# Patient Record
Sex: FEMALE | Race: WHITE | NOT HISPANIC OR LATINO | Employment: FULL TIME | URBAN - METROPOLITAN AREA
[De-identification: names, ages, dates, MRNs, and addresses within clinical notes are randomized per-mention and may not be internally consistent; named-entity substitution may affect disease eponyms.]

---

## 2023-09-06 LAB
ABO GROUP (TYPE) IN BLOOD: NORMAL
ANTIBODY SCREEN: NORMAL
HEPATITIS B VIRUS SURFACE AG PRESENCE IN SERUM: NONREACTIVE
HEPATITIS C VIRUS AB PRESENCE IN SERUM: NONREACTIVE
HIV 1/ 2 AG/AB SCREEN: NONREACTIVE
RH FACTOR: NORMAL
SYPHILIS TOTAL AB: NONREACTIVE

## 2023-09-07 LAB
CHLAMYDIA TRACH., AMPLIFIED: NEGATIVE
N. GONORRHEA, AMPLIFIED: NEGATIVE

## 2023-10-13 ENCOUNTER — TELEPHONE (OUTPATIENT)
Dept: ENDOCRINOLOGY | Facility: CLINIC | Age: 33
End: 2023-10-13
Payer: COMMERCIAL

## 2023-10-13 DIAGNOSIS — Z31.41 FERTILITY TESTING: ICD-10-CM

## 2023-10-13 DIAGNOSIS — Z01.812 ENCOUNTER FOR PREPROCEDURAL LABORATORY EXAMINATION: ICD-10-CM

## 2023-10-13 NOTE — TELEPHONE ENCOUNTER
Patient says she will start her cycle next week and needs a Hysterscopy order placed for when she starts     Order pended to Delmy per her note.  Myra Prieto RN 10/13/23 2:02 PM

## 2023-10-16 ASSESSMENT — LIFESTYLE VARIABLES
HISTORY_ALCOHOL_USE: NO
TOBACCO_USE: YES

## 2023-10-17 PROBLEM — M17.9 OSTEOARTHRITIS OF KNEE: Status: ACTIVE | Noted: 2018-04-19

## 2023-10-17 PROBLEM — L68.0 FEMALE HIRSUTISM: Status: ACTIVE | Noted: 2023-10-17

## 2023-10-17 PROBLEM — E03.9 HYPOTHYROID: Status: ACTIVE | Noted: 2023-10-17

## 2023-10-17 PROBLEM — Z98.84 S/P LAPAROSCOPIC SLEEVE GASTRECTOMY: Status: ACTIVE | Noted: 2020-07-16

## 2023-10-17 PROBLEM — Z86.010 HISTORY OF COLONIC POLYPS: Status: ACTIVE | Noted: 2019-10-14

## 2023-10-17 PROBLEM — G47.33 OSA (OBSTRUCTIVE SLEEP APNEA): Status: ACTIVE | Noted: 2020-03-19

## 2023-10-17 PROBLEM — K44.9 HIATAL HERNIA: Status: ACTIVE | Noted: 2020-03-10

## 2023-10-17 PROBLEM — N97.9 SECONDARY FEMALE INFERTILITY: Status: ACTIVE | Noted: 2023-10-17

## 2023-10-17 PROBLEM — K29.80 DUODENITIS: Status: ACTIVE | Noted: 2019-10-14

## 2023-10-17 PROBLEM — Z86.0100 HISTORY OF COLONIC POLYPS: Status: ACTIVE | Noted: 2019-10-14

## 2023-10-17 PROBLEM — R42 VERTIGO: Status: ACTIVE | Noted: 2023-10-17

## 2023-10-17 PROBLEM — E66.9 OBESITY: Status: ACTIVE | Noted: 2023-10-17

## 2023-10-17 PROBLEM — N91.5 OLIGOMENORRHEA: Status: ACTIVE | Noted: 2023-10-17

## 2023-10-17 PROBLEM — D64.9 ANEMIA: Status: ACTIVE | Noted: 2023-10-17

## 2023-10-17 PROBLEM — K25.9 GASTRIC ULCER: Status: ACTIVE | Noted: 2019-10-14

## 2023-10-17 PROBLEM — F41.9 ANXIETY: Status: ACTIVE | Noted: 2023-10-17

## 2023-10-17 RX ORDER — PROGESTERONE 100 MG/1
100 CAPSULE ORAL 2 TIMES DAILY
COMMUNITY
Start: 2017-05-30 | End: 2024-02-08 | Stop reason: ALTCHOICE

## 2023-10-17 RX ORDER — LEVOTHYROXINE SODIUM 50 UG/1
1 TABLET ORAL DAILY
COMMUNITY
Start: 2017-05-26 | End: 2024-02-08 | Stop reason: ALTCHOICE

## 2023-10-17 RX ORDER — VALACYCLOVIR HYDROCHLORIDE 500 MG/1
500 TABLET, FILM COATED ORAL DAILY
COMMUNITY

## 2023-10-17 RX ORDER — FOLIC ACID 0.4 MG
0.4 TABLET ORAL DAILY
COMMUNITY

## 2023-10-17 RX ORDER — FERROUS SULFATE 325(65) MG
65 TABLET ORAL NIGHTLY
COMMUNITY
Start: 2023-07-18

## 2023-10-17 RX ORDER — VENLAFAXINE HYDROCHLORIDE 225 MG/1
1 TABLET, EXTENDED RELEASE ORAL DAILY
COMMUNITY
Start: 2017-08-08 | End: 2024-01-11 | Stop reason: WASHOUT

## 2023-10-17 RX ORDER — VENLAFAXINE HYDROCHLORIDE 150 MG/1
150 CAPSULE, EXTENDED RELEASE ORAL DAILY
COMMUNITY
End: 2024-01-11 | Stop reason: WASHOUT

## 2023-10-17 RX ORDER — MECLIZINE HYDROCHLORIDE 25 MG/1
1 TABLET ORAL 3 TIMES DAILY
COMMUNITY
Start: 2017-08-08 | End: 2024-02-08 | Stop reason: ALTCHOICE

## 2023-10-17 RX ORDER — ERGOCALCIFEROL 1.25 MG/1
1 CAPSULE ORAL
COMMUNITY
Start: 2023-09-21

## 2023-10-17 RX ORDER — OMEPRAZOLE 40 MG/1
40 CAPSULE, DELAYED RELEASE ORAL DAILY
COMMUNITY

## 2023-10-17 RX ORDER — METFORMIN HYDROCHLORIDE 500 MG/1
TABLET, EXTENDED RELEASE ORAL
COMMUNITY
Start: 2017-05-02 | End: 2024-02-08 | Stop reason: ALTCHOICE

## 2023-10-17 RX ORDER — VENLAFAXINE 75 MG/1
75 TABLET ORAL 2 TIMES DAILY
COMMUNITY

## 2023-10-18 ENCOUNTER — INITIAL PRENATAL (OUTPATIENT)
Dept: MATERNAL FETAL MEDICINE | Facility: CLINIC | Age: 33
End: 2023-10-18
Payer: COMMERCIAL

## 2023-10-18 VITALS
WEIGHT: 289 LBS | HEIGHT: 70 IN | SYSTOLIC BLOOD PRESSURE: 122 MMHG | DIASTOLIC BLOOD PRESSURE: 85 MMHG | BODY MASS INDEX: 41.37 KG/M2

## 2023-10-18 DIAGNOSIS — F41.9 ANXIETY: ICD-10-CM

## 2023-10-18 DIAGNOSIS — Z31.69 ENCOUNTER FOR PRECONCEPTION CONSULTATION: Primary | ICD-10-CM

## 2023-10-18 DIAGNOSIS — Z90.3 HISTORY OF GASTRECTOMY: ICD-10-CM

## 2023-10-18 DIAGNOSIS — Z98.890 HISTORY OF CARDIAC RADIOFREQUENCY ABLATION: ICD-10-CM

## 2023-10-18 PROCEDURE — 99213 OFFICE O/P EST LOW 20 MIN: CPT | Performed by: OBSTETRICS & GYNECOLOGY

## 2023-10-18 PROCEDURE — 99203 OFFICE O/P NEW LOW 30 MIN: CPT | Performed by: OBSTETRICS & GYNECOLOGY

## 2023-10-18 ASSESSMENT — PAIN - FUNCTIONAL ASSESSMENT: PAIN_FUNCTIONAL_ASSESSMENT: 0-10

## 2023-10-18 ASSESSMENT — PAIN SCALES - GENERAL: PAINLEVEL_OUTOF10: 0 - NO PAIN

## 2023-10-18 NOTE — PROGRESS NOTES
[facility]  Department of Obstetrics & Gynecology  Division of Maternal Fetal Medicine    Patient Name: Nicky Herbert  Patient YOB: 1990  Patient MRN: [id number]    Nicky Herbert is a 33y  who presents for a [facility] preconception consultation for history of cardiac ablation at age 13, vertical sleeve gastrectomy, anxiety, planned IVF pregnancy, and HSV.    History of cardiac ablation ()  For tachycardia; had routine follow-up with F and then was told no further follow-up needed  No issues with her  pregnancy; has no exercise intolerances or cardiac concerns  Recommend baseline EKG     Bariatric Surgery  S/p vertical sleeve gastretomy (2020, [facility]). No history of dumping syndrome (had a recent 3h OGTT that was normal)  Lost 150lb initially, but has gained some back and has residual class II obesity.  Obesity increases the risk for gestational diabetes, PreE, CS, operative and infectious morbidity, and thromboembolism.  For the fetus, obesity increases the risk of congenital anomalies (neural tube defects, cardiac anomalies, facial clefting - even after controlling for DM), growth abnormalities, miscarriage, and stillbirth (2-4x greater). The number of congenital anomalies after bariatric surgery is not increased compared with the general population, and there is a trend towards more appropriately grown infants in women after bariatric surgery.  Recommend checking protein, iron, B12, folate, vitamin D, and calcium in first trimester and supplementing if needed    Anxiety and depression  Current medications: venlafaxine 75mg BID  SSRI/SNRIs increase risk of  Poor Adjustment Syndrome: Irritability, feeding difficulties, increased respiratory rate. Severe symptoms are rare and occur in 3%. Symptoms peak in 24-48h and resolve in a week. There is no evidence that discontinuing or tapering these medications in the third trimester reduces this risk, but it does increase  the risk of maternal relapse.   When weighing the risks and benefits, in regards to continued treatment, it is important to note that having untreated psychiatric disorders is related to pregnancy complications (LBW, PTD).  Each patient has to make an individualized decision based upon their level of comfort with either continuation or discontinuation of medication.      IVF pregnancy  We discussed that most IVF pregnancies are uncomplicated.  Regardless of whether PGT has been performed, we recommend all patients who have achieved pregnancy with IVF be offered prenatal genetic screening.  IVF pregnancy is associated with an increased risk of  delivery, low birth weight, and stillbirth. We recommend assessment of fetal growth at 30 and 36 weeks as well as weekly NSTs starting at 36 weeks.  We also recommend aspirin 162mg at bedtime by 12 weeks    HSV  Diagnosed   Currently on once daily suppression. Will increase to twice daily at 36w to decrease chance of outbreak at time of delivery    Thank you for this consult. Please reach out for any questions or concerns.    Paola Osorio MD  Maternal Fetal Medicine    HPI: Overall doing well. Concerned about low birth weight with history of vertical sleeve gastrectomy, but we discussed that there is more a trend towards appropriate-growth infants, not fetal growth restriction, with bariatric surgery. No cardiac concerns, has no physical limitations and was released from [facility] cardiology after appropriate follow-up.     Obstetrical History  :  at term, 7lb4oz; VFI 7lb40z had a broken clavicle, but she denies history of shoulder dystocia (labored for 6h and pushed for 19 minutes)    Gynecological History  Last pap smear : cytology negative  HSV diagnosed     Medical History  Anxiety  Class II obesity    Surgical History  Cardiac ablation  Vertical sleeve gastrectomy  Abdominoplasty    Medications  Valtrex daily  Venlafaxine 75mg  BID  Vitamin D supplement  Folic acid supplement  Iron supplement    Allergies  No Known Allergies    Family History  No family history of birth defects  Maternal aunt with breast or ovarian cancer (she cannot remember)    Social History  Denies illicit drug use  Never a smoker    Works for [email or web address]    Objective:   General: NAD  Mood/affect: appropriate  Resp: normal effort  Ext: no c/c/e    Visit Vitals  /85

## 2023-10-25 ENCOUNTER — PREP FOR PROCEDURE (OUTPATIENT)
Dept: ENDOCRINOLOGY | Facility: CLINIC | Age: 33
End: 2023-10-25
Payer: COMMERCIAL

## 2023-10-25 RX ORDER — KETOROLAC TROMETHAMINE 30 MG/ML
30 INJECTION, SOLUTION INTRAMUSCULAR; INTRAVENOUS ONCE AS NEEDED
Status: CANCELLED | OUTPATIENT
Start: 2023-10-25 | End: 2023-10-30

## 2023-10-25 RX ORDER — ACETAMINOPHEN 325 MG/1
650 TABLET ORAL ONCE AS NEEDED
Status: CANCELLED | OUTPATIENT
Start: 2023-10-25

## 2023-10-26 ENCOUNTER — HOSPITAL ENCOUNTER (OUTPATIENT)
Dept: ENDOCRINOLOGY | Facility: CLINIC | Age: 33
Discharge: HOME | End: 2023-10-26
Payer: COMMERCIAL

## 2023-10-26 VITALS
RESPIRATION RATE: 20 BRPM | SYSTOLIC BLOOD PRESSURE: 119 MMHG | TEMPERATURE: 97.9 F | WEIGHT: 289.9 LBS | HEART RATE: 93 BPM | BODY MASS INDEX: 41.5 KG/M2 | HEIGHT: 70 IN | OXYGEN SATURATION: 97 % | DIASTOLIC BLOOD PRESSURE: 83 MMHG

## 2023-10-26 DIAGNOSIS — Z31.41 FERTILITY TESTING: ICD-10-CM

## 2023-10-26 LAB — PREGNANCY TEST URINE, POC: NEGATIVE

## 2023-10-26 PROCEDURE — 81025 URINE PREGNANCY TEST: CPT | Performed by: STUDENT IN AN ORGANIZED HEALTH CARE EDUCATION/TRAINING PROGRAM

## 2023-10-26 PROCEDURE — 58555 HYSTEROSCOPY DX SEP PROC: CPT | Performed by: OBSTETRICS & GYNECOLOGY

## 2023-10-26 RX ORDER — ACETAMINOPHEN 325 MG/1
650 TABLET ORAL ONCE AS NEEDED
Status: DISCONTINUED | OUTPATIENT
Start: 2023-10-26 | End: 2023-10-27 | Stop reason: HOSPADM

## 2023-10-26 RX ORDER — KETOROLAC TROMETHAMINE 30 MG/ML
30 INJECTION, SOLUTION INTRAMUSCULAR; INTRAVENOUS ONCE AS NEEDED
Status: DISCONTINUED | OUTPATIENT
Start: 2023-10-26 | End: 2023-10-27 | Stop reason: HOSPADM

## 2023-10-26 NOTE — PROGRESS NOTES
Patient ID: Yasmeen Herbert is a 33 y.o. female.    Hysteroscopy diagnostic    Date/Time: 10/26/2023 2:31 PM    Performed by: Mckayla Bernstein MD  Authorized by: CHEYANNE Wolff-CNP    Consent:     Consent obtained:  Verbal and written    Consent given by:  Patient    Risks, benefits, and alternatives were discussed: yes      Risks discussed:  Bleeding, infection and pain  Universal protocol:     Procedure explained and questions answered to patient or proxy's satisfaction: yes      Relevant documents present and verified: yes      Test results available: yes      Imaging studies available: yes      Required blood products, implants, devices, and special equipment available: yes      Immediately prior to procedure, a time out was called: yes      Patient identity confirmed:  Verbally with patient, arm band and hospital-assigned identification number  Pre-procedure details:     Skin preparation:  Povidone-iodine  Procedure specific details:      Procedure: Diagnostic Hysteroscopy   Preop diagnosis: Fertility testing  Post op diagnosis: Same   Assistant: None    Anesthesia: None   IV: None   EBL: 3 cc  Specimens: None   Complications: None   Risks benefits and alternatives of the procedure explained to the patient and informed consent was obtained. Urine pregnancy test was performed and was negative. Time out was performed. The patient was placed in the dorsal lithotomy position and a sterile speculum was placed in the vagina. The cervix was sterilized with Betadine x3. Paracervical block with lidocaine 1% was administered.   Tenaculum: No  Dilation: No  The hysteroscope was placed in the cervix and advanced into the uterine cavity. Normal saline was used for distension media. Images were obtained and findings noted as below.   All instruments were then removed. Good hemostasis was noted. Patient tolerated the procedure well returned to the recovery area in stable condition. .   Findings:   Cavity: Smooth cavity  no lesions noted  Ostia: Bilateral tubal ostia visualized  Additional Notes:    Attending Attestation: I performed the hysteroscopy with no trainees present.           Post-procedure details:     Procedure completion:  Tolerated well, no immediate complications

## 2023-11-02 ENCOUNTER — APPOINTMENT (OUTPATIENT)
Dept: ENDOCRINOLOGY | Facility: CLINIC | Age: 33
End: 2023-11-02
Payer: COMMERCIAL

## 2023-11-02 ASSESSMENT — LIFESTYLE VARIABLES
HISTORY_ALCOHOL_USE: NO
TOBACCO_USE: YES

## 2023-12-11 NOTE — PROGRESS NOTES
In Person    IVF Note    Patient is a 33 y.o.  female with PCOS a history of infertility for 5 years. She was seen at [facility] however is transferring care due to insurance.    Referred by:  Delmy Shaikh NP  Here today with: NA  Indication for IVF: Polycystic Ovarian Syndrome  AMH: 3.12 on 2023 in Care Everywhere  Status of fallopian tubes: 2017, reported open  Saline Ultrasound: N/A  Hysteroscopy:  normal 10/26/2023  Past Infertility Treatments:   First partner: With her daughter, she had a different partner. That was with unprotected intercourse over 5 years. Then did one clomid cycle TIC, clomid/IUI x 2 cycles    Current partner: 1 clomid TIC     Partner History:  PARTNER HISTORY:  PARTNER HISTORY: Elliott Cole  Age-  87  Occupation- [employer]  Prior fertility history: yes x 2 (15 and 13)  PMH: No  PSH: No  Smoking: yes, half a pack  Alcohol Use: social  Drug Use: no  Medications: no  Injuries /STDs: no  SA: as below    GYN HISTORY   Dyspareunia/Dyschezia/Dysuria:  No  Pelvic pain: No  STDs: HSV genital, , on valcyclovir suppression  HX of abnormal Pap: No  HX of abnormal Mammo: n/a  LMP: 2023  Menstrual cycles: Regular (now after bariatric surgery)  Pap smear: NILM, HPV - 2023  Mammogram: N/A     PMH: cardiac ablation (tissue growth blocking a chamber)  Past Medical History:   Diagnosis Date    Cutaneous abscess of back (any part, except buttock) 2015    Abscess of back    Personal history of other diseases of the nervous system and sense organs 2016    History of acute otitis media     History of any clotting: No  History of hospitalizations or surgeries: surgeries as below  History of easy bleeding/bruising: No    Meds: valacylovir, effexor, iron, vitamin D, folic acid, omeprazole    PSH  Gastrectomy 2020 (lost 130 pounds and gained back 50), abdominoplasty, cardiac catheter ablasion    Genetic screening Hx  Patient: [employer] 2bP:  not performed,  declines  Sperm: [employer] 2bP: not performed, declines    Social history  Social History     Tobacco Use    Smoking status: Never    Smokeless tobacco: Never   Substance Use Topics    Alcohol use: Not Currently    Drug use: Never   Alcohol use: occasional  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    Partner SA:  Volume (Semen)  1.5 mL 1.9   Concentration(Semen)  15 mill/mL 43.08   Total Motility (Semen)  40 % 71   Prog. Motility (Semen)  32 % 46   Prog. Motility (Semen)  % 24   Total No of Sperm (Semen)  39 mill 81.85   Total No of Motile (Semen)  mill 57.86   Total No of Rnd Cells (Semen)  5 mill 2.4   Leukocyte (Semen) Negative   VOLUME CN (Post-Wash)  mL 3.8   CONCENTRATION CN (Post-Wash)  mill/mL 24.24   TOTAL MOTILITY CN (Post-Wash)  % 51   PROG. MOTILITY CN (Post-Wash)  % 34   NON PROG. MOTILITY CN (Post-Wash)  % 17   TOTAL NO OF SPERM CN (Post-Wash)  mill 92.12   TOTAL NO OF MOTILE CN (Psot-wash)  mill 47.21   % Normal (Semen)  4 % 1.0 Abnormal    % Head defects (Semen)  % 98.8   % Neck Midpiece (Semen)  % 35.3   % Tail defects (Semen)  % 10.3   % Ex Residual Cytoplasm (Semen)  % 0.00   Tot. No of Norm. Motile Sperm (Semen)  mill 0.818   Tot. No of Norm. Sperm (Semen)  mill 0.579       VITALS:  /82   Pulse 105   Temp 36.7 °C (98.1 °F)   LMP 2023   BMI:   BMI Readings from Last 1 Encounters:   10/26/23 41.60 kg/m²     BMI Testing  Fertility Center  CBC Within 1 year   CMP Within 1 year   HgbA1c Within 1 year   Mag, Phos, Vit D <18 Within 1 year   MFM > 40  REQ   Wt loss consult > 40 OPT     ASSESSMENT   33 y.o.  female with PCOS and secondary infertility.    Secondary Infertility  - likely secondary to PCOS  - explained that with regular cycles and since she has only undergone clomid TIC x 1 with current partner, it would be reasonable to start with letrozole 5  mg/OPK/IUI for 3 months prior to proceeding with IVF  - Patient to call clinic to decide if she wants to proceed with medicated TIC or IUI, versus IVF. If she decides TIC or IUI, will do CD 3-7 letrozole 5 mg. If she does IUI, she will need a repeat HSG (last HSG in 2017). If after 3 months of TIC/IUI she does not conceive, would recommend proceeding to IVF.  - If she does IVF, will do below protocol. Will need nursing IVF consult appt. Has already had cavity evaluation.    Indication for IVF: Polycystic Ovarian Syndrome  Partner SA:  overall normal, however morphology 1%    We reviewed IVF and discussed the following:   In-vitro fertilization and embryo transfer  Stimulation protocols   Oocyte retrieval, risks    Cryopreservation   Assessment of fertilization   Embryo development  Statistics  ICSI/Assisted hatching   Embryo transfer and preparation    Risks of OHSS and multiple gestation   Cancelled cycles   Use of birth control   Selective reduction   Number of embryos to transfer   Ectopic pregnancy  and miscarriage  Team based care  Informed consent procedures  Folic acid supplementation   Genetic carrier screening   PGT  Frozen tissue storage and transport process  Discussed that PAP and mammogram must be updated if appropriate based on age and clinical  history and results received before treatment can begin.    ART Cycle Plan    1. Protocol:  Lead in: OCP  Stimulation protocol: antagonist protocol, Follistim 200/ menopur 150 intramuscular   Trigger plan: HCG vs Lupron  Pre-retrieval meds: Antibiotics per protocol  Adjuncts: none  Notes:     2. FET:  Protocol: Programmed estrace 6 mg PO, 75 mg KHRIS  Adjuncts:  ASA 81 mg  Notes:     3. Insemination:  Sperm source: partner  Sperm collection method: Fresh with Frozen Backup  Notes:  ICSI: Yes  # of oocytes to be fertilized: all    4. Transfer:   Number of embryos to replace: 1 euploid   Stage of embryo transfer: day 5  Trial transfer needed? No    5.  Cryopreservation plan  PGT: No   Freeze all? Yes  Oocyte cryopreservation: No    6. Patient willing to accept blood transfusion: Yes    7. RN to review chart, initiate IVF boarding pass, and assure completion of the following prior to proceeding with IVF stimulation:       Orders Placed This Encounter   Procedures    Hemoglobin A1C    Testosterone,Free and Total    Dhea-Sulfate    17-Hydroxyprogesterone    Rubella Antibody, Igg    Varicella Zoster Antibody, Igg       STDs (Hepatitis B, Hepatitis C, HIV, Syphilis, GC/CT) for patient and partner to be completed within the last year (z11.3): completed  Genetic carrier testing: waiver or carrier screen completed with clearance documentation by provider for both patient and partner (z13.71): patient declines, will need to sign waiver  Rubella and varicella to be completed within the last five years (z11.59)   TSH to be completed within the last year (z13.29)  Type & Screen to be completed within the last year (z01.83)  AMH to be completed within the last year (z31.41)  Pre-IVF Imaging: Reference any orders placed by provider.  Cavity evaluation: hysteroscopy already performed and normal  Frozen sperm sample: ensure frozen partner sample (z31.41) on site prior to stimulation start date.  Verify in EMR or obtain copy of patient’s last mammogram (if applicable) and pap smear results for provider review in boarding pass.  Enroll in Engaged MD and complete annual consent forms for IVF and cryotransport agreements.  BMI checklist for BMI <18 or >40  Consults: Nursing and Financial Consult.  PAT Consult: No  Ectopic Risk: No  Medically Complex: No  Additional consults  none  and review what is in the boarding pass.    Shannon Juarez  12/12/2023  5:11 PM

## 2023-12-12 ENCOUNTER — CONSULT (OUTPATIENT)
Dept: ENDOCRINOLOGY | Facility: CLINIC | Age: 33
End: 2023-12-12
Payer: COMMERCIAL

## 2023-12-12 VITALS — SYSTOLIC BLOOD PRESSURE: 119 MMHG | TEMPERATURE: 98.1 F | DIASTOLIC BLOOD PRESSURE: 82 MMHG | HEART RATE: 105 BPM

## 2023-12-12 DIAGNOSIS — Z13.29 SCREENING FOR THYROID DISORDER: ICD-10-CM

## 2023-12-12 DIAGNOSIS — Z31.41 FERTILITY TESTING: Primary | ICD-10-CM

## 2023-12-12 DIAGNOSIS — Z11.59 ENCOUNTER FOR SCREENING FOR OTHER VIRAL DISEASES: ICD-10-CM

## 2023-12-12 DIAGNOSIS — E28.2 PCOS (POLYCYSTIC OVARIAN SYNDROME): ICD-10-CM

## 2023-12-12 DIAGNOSIS — N97.9 FEMALE INFERTILITY: ICD-10-CM

## 2023-12-12 DIAGNOSIS — Z13.1 SCREENING FOR DIABETES MELLITUS: ICD-10-CM

## 2023-12-12 PROCEDURE — 99215 OFFICE O/P EST HI 40 MIN: CPT | Performed by: OBSTETRICS & GYNECOLOGY

## 2023-12-12 PROCEDURE — 99215 OFFICE O/P EST HI 40 MIN: CPT | Mod: GC | Performed by: OBSTETRICS & GYNECOLOGY

## 2023-12-12 ASSESSMENT — ENCOUNTER SYMPTOMS
DEPRESSION: 0
OCCASIONAL FEELINGS OF UNSTEADINESS: 0
LOSS OF SENSATION IN FEET: 0

## 2023-12-12 ASSESSMENT — PATIENT HEALTH QUESTIONNAIRE - PHQ9
SUM OF ALL RESPONSES TO PHQ9 QUESTIONS 1 AND 2: 0
1. LITTLE INTEREST OR PLEASURE IN DOING THINGS: NOT AT ALL
2. FEELING DOWN, DEPRESSED OR HOPELESS: NOT AT ALL

## 2023-12-12 ASSESSMENT — PAIN SCALES - GENERAL: PAINLEVEL: 0-NO PAIN

## 2023-12-13 ENCOUNTER — TELEPHONE (OUTPATIENT)
Dept: ENDOCRINOLOGY | Facility: CLINIC | Age: 33
End: 2023-12-13
Payer: COMMERCIAL

## 2023-12-13 ENCOUNTER — SPECIALTY PHARMACY (OUTPATIENT)
Dept: PHARMACY | Facility: CLINIC | Age: 33
End: 2023-12-13

## 2023-12-13 DIAGNOSIS — Z01.812 PRE-PROCEDURE LAB EXAM: ICD-10-CM

## 2023-12-13 DIAGNOSIS — N97.9 FEMALE INFERTILITY: ICD-10-CM

## 2023-12-13 DIAGNOSIS — E66.01 CLASS 3 SEVERE OBESITY WITHOUT SERIOUS COMORBIDITY WITH BODY MASS INDEX (BMI) OF 40.0 TO 44.9 IN ADULT, UNSPECIFIED OBESITY TYPE (MULTI): ICD-10-CM

## 2023-12-13 DIAGNOSIS — Z31.83 ENCOUNTER FOR ASSISTED REPRODUCTIVE FERTILITY CYCLE: ICD-10-CM

## 2023-12-13 RX ORDER — GANIRELIX ACETATE 250 UG/.5ML
250 INJECTION, SOLUTION SUBCUTANEOUS EVERY MORNING
Qty: 5 EACH | Refills: 2 | Status: SHIPPED | OUTPATIENT
Start: 2023-12-13 | End: 2024-01-11 | Stop reason: SDUPTHER

## 2023-12-13 RX ORDER — CHORIONIC GONADOTROPIN 10000 UNIT
10000 KIT INTRAMUSCULAR ONCE AS NEEDED
Qty: 1 EACH | Refills: 0 | Status: SHIPPED | OUTPATIENT
Start: 2023-12-13 | End: 2024-01-11 | Stop reason: SDUPTHER

## 2023-12-13 RX ORDER — LEUPROLIDE ACETATE 1 MG/0.2ML
4 KIT SUBCUTANEOUS AS NEEDED
Qty: 1 KIT | Refills: 0 | Status: SHIPPED | OUTPATIENT
Start: 2023-12-13 | End: 2024-01-11 | Stop reason: SDUPTHER

## 2023-12-13 RX ORDER — NORGESTIMATE AND ETHINYL ESTRADIOL 0.25-0.035
1 KIT ORAL DAILY
Qty: 28 TABLET | Refills: 2 | Status: SHIPPED | OUTPATIENT
Start: 2023-12-13 | End: 2024-01-05

## 2023-12-13 NOTE — PROGRESS NOTES
Boarding Pass IVF Checklist    Age: 33 y.o.    Provider: Delmy Shaikh CNP  IVF Fellow  Primary RN: Liz Reynoso  Reasons for Treatment: Polycystic Ovarian Syndrome  Last BMI  10/26/23 : 41.60 kg/m²       Past Medical History:   Diagnosis Date    Cutaneous abscess of back (any part, except buttock) 2015    Abscess of back    Personal history of other diseases of the nervous system and sense organs 2016    History of acute otitis media     Ectopic Risk: N    Date Done Consultation Results/Comments   2023 Medication Protocol Lead in: OCP  Stimulation protocol: antagonist / menopur 150   Trigger plan: HCG vs Lupron  Pre-retrieval meds: Antibiotics per protocol  Adjuncts:  none  Notes: programmed fet with 81mg ASA    2023 IVF Consult   IVF Consult: Yes  PGT-A/M? No    24 IVF Consent Form Enroll in EngagedMD: Yes (Liz Reynoso, RN)  Received and in chart: Yes (Liz Reynoso, RN)   24 UH Waiver (out) Form Enroll in EngagedMD: Yes (Liz Reynoso RN)  Received and in chart: Yes   24 [employer] Transfer Authorization Form Enroll in EngagedMD: Yes (Liz Reynoso, RN)  Received and in chart: YES    23 Insurance Procedure Order Placed? Yes   23 PAT Questionnaire  Anesthesia consult?: N   24 Nursing Teaching: Yes (Liz Reynoso, RN) scheduled nurse teaching FOR    Cycle Calendar: Yes (Liz Reynoso, RN)  SART: Yes (Liz Reynoso, RN)   23 Genetic Carrier Screening Clearance DECLINED- ON FILE    N/A MFM Consult Okay to proceed? N/A   N/A Psych Consult Okay to proceed? N/A   N/A Genetics Consult Okay to proceed? N/A    Other    Date Done Female Labs Results/Comments   2023 T&S (Q 1 Year) ABO: O  Rh: POS  Antibody: NEG   2023 Hep B sAg NONREACTIVE   2023 Hep C AB NONREACTIVE   2023 HIV NONREACTIVE   2023 Syphilis NONREACTIVE   2023 GC/CT GC: NEGATIVE  CT: NEGATIVE   2023 Rubella (Q 5 Years)  Positive     2023 Varicella (Q 5 Years) Positive     23 TSH 1.820   2023 HgbA1C 5.5 (Ref range: see below %)   23 AMH 3.12   23 Carrier Screening [employer] 2bP:   Declined and Authorization completed      Uterine Cavity Eval HS2023 in Care Everywhere  Status of fallopian tubes: 2017, reported open    SIS: None    Hyster: (10/26/2023) Procedure: Diagnostic Hysteroscopy   Preop diagnosis: Fertility testing  Post op diagnosis: Same   Assistant: None    Anesthesia: None   IV: None   EBL: 3 cc  Specimens: None   Complications: None   Risks benefits and alternatives of the procedure explained to the patient and informed consent was obtained. Urine pregnancy test was performed and was negative. Time out was performed. The patient was placed in the dorsal lithotomy position and a sterile speculum was placed in the vagina. The cervix was sterilized with Betadine x3. Paracervical block with lidocaine 1% was administered.   Tenaculum: No  Dilation: No  The hysteroscope was placed in the cervix and advanced into the uterine cavity. Normal saline was used for distension media. Images were obtained and findings noted as below.   All instruments were then removed. Good hemostasis was noted. Patient tolerated the procedure well returned to the recovery area in stable condition. .   Findings:   Cavity: Smooth cavity no lesions noted  Ostia: Bilateral tubal ostia visualized  Additional Notes:    Attending Attestation: I performed the hysteroscopy with no trainees present.             Trial Transfer Needs at retrieval? No per ivf consult note   Easy IUIs: N/A   2023 Pap Smear (Q 5 Years)     Negative for intraepithelial lesion or malignancy     HPV: NEGATIVE    N/A Mammogram ( > 40) (Q 1 Year) N/A             Date Done Male Labs  LUIS RUSSELL (MRN: [id number]) Results/Comments   10/24/2023 Hep B sAg Nonreactive   10/24/2023 Hep C AB  Nonreactive   10/24/2023 HIV Nonreactive   10/24/2023 Syphilis  Nonreactive   10/24/2023 GC/CT GC: Negative  CT: Negative   8/29/23 Carrier Screening   Declined and Authorization completed  N/A   10/24/23 Semen Analysis  Volume(mL): 1.9  Count(million): 81.85  Motility(%): 71  Motile Count(million): 57.86   10/24/2023 Sperm Freeze  # of vials: 2  TMS post thaw: 9.16   Date Done Miscellaneous Results/Comments   N/A BMI Checklist  BMI > 40 or < 18 Added to chart:   Yes; Boarding Pass BMI Checklist (BMI > 40 or < 18)    Date Done Testing Results   12/29/2023 CBC Plt: 417 (Ref range: 150 - 450 x10*3/uL)  Hct: 37.3 (Ref range: 36.0 - 46.0 %)   12/29/2023 CMP BUN: 12 (Ref range: 6 - 23 mg/dL)  Cre: 0.59 (Ref range: 0.50 - 1.05 mg/dL)  AST: 15 (Ref range: 9 - 39 U/L)  ALT: 15 (Ref range: 7 - 45 U/L)   12/29/2023 HgbA1C 5.5 (Ref range: see below %)   10/18/23 MFM Clearance (required for BMI > 40 or < 18) Clearance Letter-Provider Reviewed: MENA   N/A Additional Labs (BMI < 18) N/a   12/12/23 Weight Loss Consult (must be offered for BMI > 40) Declined - provider documentation      N/A >= 45 Checklist  Added to chart:   No   **Does not need to be completed prior to placing on IVF calendar**    ERIC BEJARANO on 1/25/24 at 3:24 PM.

## 2023-12-13 NOTE — TELEPHONE ENCOUNTER
Called patient back. Patient and partner were deciding if they were going to proceed with IVF or IUI/tic. Patient and partner want to do IVF. Orders placed for IVF and boarding pass started. Pt will need nurse teaching and placed on start calendar for the weekend of January 20th. All meds ordered and cycle started. Patient just finished her last menses so she expects to get a period in early January. Protocol is ocp lead in antagonist cycle. So patient will start her OCPs with the start of her January period and will call to get scheduled for baseline at that time. Patient agreeable to plan. Consent forms sent via [employer]. Patient to call with any questions.   KEYLA TRIMBLE on 12/13/23 at 2:26 PM.

## 2023-12-29 ENCOUNTER — LAB (OUTPATIENT)
Dept: LAB | Facility: LAB | Age: 33
End: 2023-12-29
Payer: COMMERCIAL

## 2023-12-29 DIAGNOSIS — E28.2 PCOS (POLYCYSTIC OVARIAN SYNDROME): ICD-10-CM

## 2023-12-29 DIAGNOSIS — E66.01 CLASS 3 SEVERE OBESITY WITHOUT SERIOUS COMORBIDITY WITH BODY MASS INDEX (BMI) OF 40.0 TO 44.9 IN ADULT, UNSPECIFIED OBESITY TYPE (MULTI): ICD-10-CM

## 2023-12-29 DIAGNOSIS — Z11.59 ENCOUNTER FOR SCREENING FOR OTHER VIRAL DISEASES: ICD-10-CM

## 2023-12-29 DIAGNOSIS — Z01.812 PRE-PROCEDURE LAB EXAM: ICD-10-CM

## 2023-12-29 DIAGNOSIS — Z13.1 SCREENING FOR DIABETES MELLITUS: ICD-10-CM

## 2023-12-29 LAB
ALBUMIN SERPL BCP-MCNC: 4.1 G/DL (ref 3.4–5)
ALP SERPL-CCNC: 82 U/L (ref 33–110)
ALT SERPL W P-5'-P-CCNC: 15 U/L (ref 7–45)
ANION GAP SERPL CALC-SCNC: 12 MMOL/L (ref 10–20)
AST SERPL W P-5'-P-CCNC: 15 U/L (ref 9–39)
BILIRUB SERPL-MCNC: 0.3 MG/DL (ref 0–1.2)
BUN SERPL-MCNC: 12 MG/DL (ref 6–23)
CALCIUM SERPL-MCNC: 8.9 MG/DL (ref 8.6–10.3)
CHLORIDE SERPL-SCNC: 103 MMOL/L (ref 98–107)
CO2 SERPL-SCNC: 29 MMOL/L (ref 21–32)
CREAT SERPL-MCNC: 0.59 MG/DL (ref 0.5–1.05)
ERYTHROCYTE [DISTWIDTH] IN BLOOD BY AUTOMATED COUNT: 18.2 % (ref 11.5–14.5)
GFR SERPL CREATININE-BSD FRML MDRD: >90 ML/MIN/1.73M*2
GLUCOSE SERPL-MCNC: 73 MG/DL (ref 74–99)
HCT VFR BLD AUTO: 37.3 % (ref 36–46)
HGB BLD-MCNC: 11 G/DL (ref 12–16)
MCH RBC QN AUTO: 22.4 PG (ref 26–34)
MCHC RBC AUTO-ENTMCNC: 29.5 G/DL (ref 32–36)
MCV RBC AUTO: 76 FL (ref 80–100)
NRBC BLD-RTO: 0 /100 WBCS (ref 0–0)
PLATELET # BLD AUTO: 417 X10*3/UL (ref 150–450)
POTASSIUM SERPL-SCNC: 4.5 MMOL/L (ref 3.5–5.3)
PROT SERPL-MCNC: 6.8 G/DL (ref 6.4–8.2)
RBC # BLD AUTO: 4.91 X10*6/UL (ref 4–5.2)
SODIUM SERPL-SCNC: 139 MMOL/L (ref 136–145)
WBC # BLD AUTO: 8.4 X10*3/UL (ref 4.4–11.3)

## 2023-12-29 PROCEDURE — 83498 ASY HYDROXYPROGESTERONE 17-D: CPT

## 2023-12-29 PROCEDURE — 36415 COLL VENOUS BLD VENIPUNCTURE: CPT

## 2023-12-29 PROCEDURE — 86317 IMMUNOASSAY INFECTIOUS AGENT: CPT

## 2023-12-29 PROCEDURE — 83036 HEMOGLOBIN GLYCOSYLATED A1C: CPT

## 2023-12-29 PROCEDURE — 80053 COMPREHEN METABOLIC PANEL: CPT

## 2023-12-29 PROCEDURE — 85027 COMPLETE CBC AUTOMATED: CPT

## 2023-12-29 PROCEDURE — 82627 DEHYDROEPIANDROSTERONE: CPT

## 2023-12-29 PROCEDURE — 84402 ASSAY OF FREE TESTOSTERONE: CPT

## 2023-12-29 PROCEDURE — 86787 VARICELLA-ZOSTER ANTIBODY: CPT

## 2023-12-30 LAB
DHEA-S SERPL-MCNC: 111 UG/DL (ref 12–379)
EST. AVERAGE GLUCOSE BLD GHB EST-MCNC: 111 MG/DL
HBA1C MFR BLD: 5.5 %
RUBV IGG SERPL IA-ACNC: 2.6 IA
RUBV IGG SERPL QL IA: POSITIVE
VARICELLA ZOSTER IGG INDEX: 1.6 IA
VZV IGG SER QL IA: POSITIVE

## 2024-01-02 LAB — 17OHP SERPL-MCNC: 105.62 NG/DL

## 2024-01-04 ENCOUNTER — DOCUMENTATION (OUTPATIENT)
Dept: ENDOCRINOLOGY | Facility: CLINIC | Age: 34
End: 2024-01-04
Payer: COMMERCIAL

## 2024-01-04 NOTE — PROGRESS NOTES
Called patient to discuss next steps for IVF and review checklist items. Patient needs to complete consent forms. Sent patient a [employer] message with tentative calendar. Patient will plan on starting ocps with period. Patient expects period today or tomorrow if she doesn't start her period within these two days she will call the office on Monday to discuss plan. Patient scheduled for baseline US and bloodwork and nurse teaching for January 18th. Sent patient number to  specialty pharmacy.  KEYLA TRIMBLE on 1/4/24 at 9:50 AM.

## 2024-01-05 DIAGNOSIS — N97.9 FEMALE INFERTILITY: ICD-10-CM

## 2024-01-05 LAB
TESTOSTERONE FREE (CHAN): 7.1 PG/ML (ref 0.1–6.4)
TESTOSTERONE,TOTAL,LC-MS/MS: 31 NG/DL (ref 2–45)

## 2024-01-05 RX ORDER — NORGESTIMATE AND ETHINYL ESTRADIOL 0.25-0.035
KIT ORAL
Qty: 84 TABLET | Refills: 0 | Status: SHIPPED | OUTPATIENT
Start: 2024-01-05

## 2024-01-08 NOTE — PROGRESS NOTES
Patient called to let us know she started her period yesterday 1/7. Patient started OCPs at that time. Patient still on track to baseline 1/18. Patient to call with any further questions.

## 2024-01-08 NOTE — TELEPHONE ENCOUNTER
For Liz, pt has an upcoming rn visit with you, got her period a day later than expected, does this change the date that she would need to see you?

## 2024-01-09 ENCOUNTER — SPECIALTY PHARMACY (OUTPATIENT)
Dept: PHARMACY | Facility: CLINIC | Age: 34
End: 2024-01-09

## 2024-01-11 DIAGNOSIS — N97.9 FEMALE INFERTILITY: ICD-10-CM

## 2024-01-11 RX ORDER — GANIRELIX ACETATE 250 UG/.5ML
250 INJECTION, SOLUTION SUBCUTANEOUS EVERY MORNING
Qty: 5 EACH | Refills: 2 | Status: SHIPPED | OUTPATIENT
Start: 2024-01-11 | End: 2024-02-08 | Stop reason: ALTCHOICE

## 2024-01-11 RX ORDER — SYRINGE-NEEDLE,INSULIN,0.5 ML 27GX1/2"
SYRINGE, EMPTY DISPOSABLE MISCELLANEOUS
Qty: 2 EACH | Refills: 0 | Status: SHIPPED | OUTPATIENT
Start: 2024-01-11 | End: 2024-02-08 | Stop reason: ALTCHOICE

## 2024-01-11 RX ORDER — CHORIONIC GONADOTROPIN 10000 UNIT
10000 KIT INTRAMUSCULAR ONCE AS NEEDED
Qty: 1 EACH | Refills: 0 | Status: SHIPPED | OUTPATIENT
Start: 2024-01-11 | End: 2024-02-08 | Stop reason: ALTCHOICE

## 2024-01-11 RX ORDER — SYRINGE, DISPOSABLE, 3 ML
SYRINGE, EMPTY DISPOSABLE MISCELLANEOUS
Qty: 30 EACH | Refills: 3 | Status: SHIPPED | OUTPATIENT
Start: 2024-01-11 | End: 2024-02-08 | Stop reason: ALTCHOICE

## 2024-01-11 RX ORDER — LEUPROLIDE ACETATE 1 MG/0.2ML
4 KIT SUBCUTANEOUS AS NEEDED
Qty: 1 KIT | Refills: 0 | Status: SHIPPED | OUTPATIENT
Start: 2024-01-11 | End: 2024-02-08 | Stop reason: ALTCHOICE

## 2024-01-11 NOTE — PROGRESS NOTES
Re-routed all fertility medications and supplies to [facility] per insurance mandate- called [phone] for pharmacy information, correct pharmacy confirmed with rep and is located at [address] [place] [address] in [place]. Must fill with them d/t [employer] insurance.     Patient can still fill with [facility] [[email or web address]] for self-pay if needed.     Medications sent:   Follistim 600 IU cartridge, Menopur 75 unit vials, Ganirelix 250mcg syringes, Pregnyl 10,000IU vial for trigger , and Leuprolide 1mg/0.2mL kit for trigger          Nilsa Haynes (Katie), PharmDIONI  [facility]  Clinical Pharmacy Specialist- Fertility   [facility], [place]  [address] [place] [address]  Email: [email or web address]  Tel: [phone]       Fax: [phone]

## 2024-01-15 ENCOUNTER — TELEPHONE (OUTPATIENT)
Dept: ENDOCRINOLOGY | Facility: CLINIC | Age: 34
End: 2024-01-15
Payer: COMMERCIAL

## 2024-01-15 NOTE — TELEPHONE ENCOUNTER
Reason for call: Medication request  Medication requested:  Leuprolide  Notes: Patient requires a PA for this medication. May be able to be submitted verbally to the [facility] [employer] [facility] Prior Authorization dept. [phone]

## 2024-01-16 ENCOUNTER — TELEPHONE (OUTPATIENT)
Dept: RADIOLOGY | Facility: CLINIC | Age: 34
End: 2024-01-16
Payer: COMMERCIAL

## 2024-01-16 ENCOUNTER — DOCUMENTATION (OUTPATIENT)
Dept: ENDOCRINOLOGY | Facility: CLINIC | Age: 34
End: 2024-01-16
Payer: COMMERCIAL

## 2024-01-16 NOTE — TELEPHONE ENCOUNTER
BRENTON CONTE     Pt is calling because her specialty pharmacy is telling her they need clarification and asked her to ask us to please call [phone]. Thank you!

## 2024-01-16 NOTE — TELEPHONE ENCOUNTER
Called pharmacy to verify prescriptions- clarified follistim and pregnyl (will send generic), pharmacy will reach out to patient to coordinate delivery and let us know if they need anything else.    01/16/24 at 12:16 PM - Mckayla Stout RN

## 2024-01-16 NOTE — PROGRESS NOTES
Patient having some trouble with her PA for lupron. Patient advised to call pharmacy and order her meds since she is supposed to start this weekend. PA was re submitted yesterday 1/15. Since lupron would be used as trigger there is some time for the PA to be completed. Discussed with patient about possibility of ordering lupron from  spec pharmacy if need be.   KEYLA TRIMBLE on 1/16/24 at 4:47 PM.

## 2024-01-18 ENCOUNTER — TELEPHONE (OUTPATIENT)
Dept: ENDOCRINOLOGY | Facility: CLINIC | Age: 34
End: 2024-01-18

## 2024-01-18 ENCOUNTER — CLINICAL SUPPORT (OUTPATIENT)
Dept: ENDOCRINOLOGY | Facility: CLINIC | Age: 34
End: 2024-01-18
Payer: COMMERCIAL

## 2024-01-18 ENCOUNTER — ANCILLARY PROCEDURE (OUTPATIENT)
Dept: ENDOCRINOLOGY | Facility: CLINIC | Age: 34
End: 2024-01-18
Payer: COMMERCIAL

## 2024-01-18 DIAGNOSIS — N97.9 FEMALE INFERTILITY: ICD-10-CM

## 2024-01-18 DIAGNOSIS — Z01.812 PRE-PROCEDURE LAB EXAM: ICD-10-CM

## 2024-01-18 LAB
ESTRADIOL SERPL-MCNC: 32 PG/ML
HCT VFR BLD AUTO: 38.5 % (ref 36–46)

## 2024-01-18 PROCEDURE — 82670 ASSAY OF TOTAL ESTRADIOL: CPT

## 2024-01-18 PROCEDURE — 76857 US EXAM PELVIC LIMITED: CPT | Performed by: OBSTETRICS & GYNECOLOGY

## 2024-01-18 PROCEDURE — 36415 COLL VENOUS BLD VENIPUNCTURE: CPT

## 2024-01-18 PROCEDURE — 85014 HEMATOCRIT: CPT

## 2024-01-18 PROCEDURE — 76857 US EXAM PELVIC LIMITED: CPT

## 2024-01-18 NOTE — TELEPHONE ENCOUNTER
"Spoke with patient regarding her plans for single embryo transfer and discard of remaining embryos. Advised that it is not typical to discard extra frozen embryos after her first embryo transfer, and that I have not had any patients make this request previously in my practice. Advised that once embryos are discarded, that she would have to go through the entire IVF process including meds, stimulation and egg retrieval to generate new embryos (which is not always possible). Advised that many patients do not conceive with the first embryo transfer and that her cumulative success rates are highest with more than one embryo transfer. She let me know that she has been \"down this road\" with her partner previously and does not want to do more than one treatment and one transfer. Confirmed multiple times that this is her wish. We will proceed with this plan after forms are signed.   Mckayla Bernstein MD  01/18/24  12:10 PM    "

## 2024-01-18 NOTE — PROGRESS NOTES
KEYLA REYNOSO on 1/18/24 at 10:29 AM.    IVF NURSE CONSULT  Here for IVF Nurse consult.    Tentative Calendar given and reviewed. Yes    Consents sent via Engaged MD:      IVF: No  Genetic Auth: Yes  PAT form: Yes    Financial consult requirement reviewed with patient: Yes    Meds have been ordered from: [facility]     Patient aware that plan is a freeze all cycle.   Yes  IC/ abstinence and activity guidelines reviewed. Yes  Next steps: Patient scheduled for IVF baseline on 1/18 will plan on med start on 1/21      Patient still needs to sign consent form. Patient did sign it but form was voided. Patient will re sign today and will sign of boarding pass with doctor of the day. Patient stating that she would like to discard frozen embryos within 6 week [facility] storage time/after first transfer attempt. Confirmed that patient was sure of this decision but will also speak with doctor of the day to discuss further.     Keyla Reynoso  01/18/2024  10:30 AM

## 2024-01-18 NOTE — PROGRESS NOTES
[facility] NOTE - IVF STIM BASELINE  Patient presents for baseline ultrasound and/or labs.    Treatment protocol: antagonist fsh 200/menopur 150  Lead in: ocp  Start date for lead in: 1/7/24  Last estrace/OCP date: 1/17/24  PGT:  No    Plan to freeze: embryos    Boarding pass signed off:  No IVF consent form not done- re sent to patient after reviewing. Patient completing today and will sign off boarding pass     Medically complex on boarding pass:   no    If indicated, is PAT consult complete?  N/A    SPERM:  partner  Fresh with Frozen Backup  Number of vials confirmed: 2 on 1.18.2024 by RF  Extensive discussion with patient about discard of any non-transferred embryos and plan for SET. Will evaluate embryo quality for appropriate number to transfer on transfer day.     Additional details: Nurse teaching done today. Patient to do IM injections. Patient states in consent form that she would like to discard the embryos after first transfer attempt/6 week [facility] storage time. Will speak with doctor of the day to review.   Keyla Reynoso  01/18/2024  10:09 AM    Will need to come re-baseline next week after her meds arrive.   Mckayla Bernstein MD      Called patient with plan. Patient will plan on re base lining 2/1 and will plan on ocp stop 1/31. Patient will call when her meds arrive and if there is room in the ivf start calendar patient may get a baseline 1/25 and plan on stopping ocps 1/24 with a med start the following calendar. Patient aware to keep us updated on meds. Patient to continue OCPs for now. Patient transferred to  to schedule.   KEYLA REYNOSO on 1/18/24 at 1:17 PM.

## 2024-01-24 ENCOUNTER — APPOINTMENT (OUTPATIENT)
Dept: ENDOCRINOLOGY | Facility: CLINIC | Age: 34
End: 2024-01-24
Payer: COMMERCIAL

## 2024-01-24 ENCOUNTER — TELEPHONE (OUTPATIENT)
Dept: ENDOCRINOLOGY | Facility: CLINIC | Age: 34
End: 2024-01-24
Payer: COMMERCIAL

## 2024-01-24 NOTE — TELEPHONE ENCOUNTER
For Liz AGUILAR Pt finally got her meds from her specialty pharmacy.  She is scheduled to monitor with us on 2.1.24 she said when she spoke to you, she was told if we have something here at [facility] she could move to that day.  She also wants to know if and earlier monitor would change when she takes her meds.

## 2024-01-24 NOTE — TELEPHONE ENCOUNTER
Called patient back. Patient got her medications today and would like to be added to this weekend for her med start if possible. Okay to add patient on to start this weekend. Patient will plan on last ocp today and will baseline again tomorrow. Pt still has to re sign her IVF consent form and will do that today. Bp to be signed off after that.     KEYLA TRIMBLE on 1/24/24 at 2:03 PM.

## 2024-01-25 ENCOUNTER — DOCUMENTATION (OUTPATIENT)
Dept: ENDOCRINOLOGY | Facility: CLINIC | Age: 34
End: 2024-01-25

## 2024-01-25 ENCOUNTER — ANCILLARY PROCEDURE (OUTPATIENT)
Dept: ENDOCRINOLOGY | Facility: CLINIC | Age: 34
End: 2024-01-25
Payer: COMMERCIAL

## 2024-01-25 DIAGNOSIS — N97.9 FEMALE INFERTILITY: ICD-10-CM

## 2024-01-25 LAB — ESTRADIOL SERPL-MCNC: 38 PG/ML

## 2024-01-25 PROCEDURE — 82670 ASSAY OF TOTAL ESTRADIOL: CPT

## 2024-01-25 PROCEDURE — 76857 US EXAM PELVIC LIMITED: CPT

## 2024-01-25 PROCEDURE — 36415 COLL VENOUS BLD VENIPUNCTURE: CPT

## 2024-01-25 PROCEDURE — 76857 US EXAM PELVIC LIMITED: CPT | Performed by: OBSTETRICS & GYNECOLOGY

## 2024-01-25 NOTE — PROGRESS NOTES
[facility] - [facility] STIM [facility]EL[facility]  Patient presents for baseline ultrasound and/or labs.    Treatment protocol: ANTAGONIST /MENOPUR 150 Meds given IM   Lead in: OCP  Start date for lead in: 1/7/24  Last estrace/OCP date: 1/24/24  PGT:  No    Plan to freeze: embryos    Boarding pass signed off:  No pt needs to re sign consent forms, all other items completed, will do today and get patient signed off. Patient wants to discard embryos after her first transfer so original consent form was voided in error.     Medically complex on boarding pass:   no    If indicated, is PAT consult complete?  N/A    SPERM:  partner  Fresh with Frozen Backup  Number of vials confirmed: 2 on 1/18/24 by RF    Additional details: Patient had baseline last week but had issues getting her medications so she was moved to this week and stayed on OCPs one week longer. Patient took OCP yesterday and will plan on med start on Sunday 1/28. Patient plans on doing one embryo transfer regardless of outcome and will discard all remaining embryos. SEE ADDENDUM BELOW    Liz Reynoso  01/25/2024  8:24 AM    Reviewed and approved by ERIC BEJARANO on 1/25/24 at 12:45 PM.    [facility] PROVIDER NOTE  Ultrasound and/or labs reviewed at [facility].   Results for orders placed or performed in visit on 01/25/24 (from the past 96 hour(s))   Estradiol   Result Value Ref Range    Estradiol 38 pg/mL     Sun 1/28 (Day 4)   follitropin beta injection: Inject 200 Units once daily in the evening into the muscle   menotropins recon soln injection: Inject 150 Units once daily in the evening into the muscle    Mon 1/29 (Day 5)   follitropin beta injection: Inject 200 Units once daily in the evening into the muscle   menotropins recon soln injection: Inject 150 Units once daily in the evening into the muscle    Tue 1/30 (Day 6)   follitropin beta injection: Inject 200 Units once daily in the evening into the muscle   menotropins recon soln injection: Inject 150 Units  once daily in the evening into the muscle    RTC in  1/31  for Follicle scan and Estradiol.   Eric Bejarano  01/25/2024  12:46 PM    Called patient with plan. Patient will start her meds on Sunday with her IM injections and will mix menopur and follistim. Patient already scheduled for 1/31. Will sign of boarding pass with Dr. Bejarano this afternoon.   KEYLA TRIMBLE on 1/25/24 at 2:17 PM.    ADDENDUM:  Called patient and reviewed embryo disposition options.  She agrees to freeze ALL EMBRYOS and wait to discard embryos until results of first transfer are known.  She will need to update her consent form and sign [employer] forms  ERIC BEJARANO on 1/25/24 at 4:00 PM.

## 2024-01-25 NOTE — PROGRESS NOTES
Dr. Eisenberg called patient and spoke to her about decision to discard embryos following her first transfer regardless of outcome, patient has decided that she will freeze any embryos she has until at least finding out the outcome of a first transfer. Patient will need to re sign her consent form and then fill out [employer] forms. All forms sent via [employer]. Will update boarding pass once patient signs these forms.   KEYLA TRIMBLE on 1/25/24 at 4:15 PM.

## 2024-01-31 ENCOUNTER — ANCILLARY PROCEDURE (OUTPATIENT)
Dept: ENDOCRINOLOGY | Facility: CLINIC | Age: 34
End: 2024-01-31
Payer: COMMERCIAL

## 2024-01-31 DIAGNOSIS — N97.9 FEMALE INFERTILITY: ICD-10-CM

## 2024-01-31 LAB — ESTRADIOL SERPL-MCNC: 318 PG/ML

## 2024-01-31 PROCEDURE — 76857 US EXAM PELVIC LIMITED: CPT

## 2024-01-31 PROCEDURE — 36415 COLL VENOUS BLD VENIPUNCTURE: CPT

## 2024-01-31 PROCEDURE — 76857 US EXAM PELVIC LIMITED: CPT | Performed by: OBSTETRICS & GYNECOLOGY

## 2024-01-31 PROCEDURE — 82670 ASSAY OF TOTAL ESTRADIOL: CPT

## 2024-01-31 NOTE — PROGRESS NOTES
CYCLING NOTE    Reason for IVF: secondary infertility and PCOS  Provider: IVF Fellow (Dr. Juarez)  Treatment protocol: ANTAGONIST /MENOPUR 150 Meds given IM   Lead in: OCP   Start date for lead in: 1/7/24   Last estrace/OCP date: 1/24/24   PGT:   No   Plan to freeze: embryos     Patient stayed for nurse visit. Pain is 0/10  Team will contact patient later today with results and plan.    Keyla Reynoso  01/31/2024  7:58 AM    [facility] PROVIDER NOTE  Ultrasound and/or labs reviewed at [facility].   Results for orders placed or performed in visit on 01/31/24 (from the past 96 hour(s))   Estradiol   Result Value Ref Range    Estradiol 318 pg/mL     Wed 1/31 (Day 7)   follitropin beta injection: Inject 200 Units once daily in the evening into the muscle   menotropins recon soln injection: Inject 150 Units once daily in the evening into the muscle    Thu 2/1 (Day 8)   follitropin beta injection: Inject 200 Units once daily in the evening into the muscle   menotropins recon soln injection: Inject 150 Units once daily in the evening into the muscle   ganirelix syringe injection: Inject 250 mcg once daily in the morning under the skin    Fri 2/2 (Day 9)   ganirelix syringe injection: Inject 250 mcg once daily in the morning under the skin    RTC in two days for Follicle scan and Estradiol.   Mckayla Bernstein  01/31/2024  12:45 PM    Called patient with plan. Patient will start antagonist tomorrow morning and continue daily. NO changes to other medications. Patient already scheduled for Friday appointment.   KEYLA REYNOSO on 1/31/24 at 1:22 PM.

## 2024-02-01 ENCOUNTER — APPOINTMENT (OUTPATIENT)
Dept: ENDOCRINOLOGY | Facility: CLINIC | Age: 34
End: 2024-02-01
Payer: COMMERCIAL

## 2024-02-02 ENCOUNTER — ANCILLARY PROCEDURE (OUTPATIENT)
Dept: ENDOCRINOLOGY | Facility: CLINIC | Age: 34
End: 2024-02-02
Payer: COMMERCIAL

## 2024-02-02 DIAGNOSIS — N97.9 FEMALE INFERTILITY: ICD-10-CM

## 2024-02-02 LAB — ESTRADIOL SERPL-MCNC: 832 PG/ML

## 2024-02-02 PROCEDURE — 36415 COLL VENOUS BLD VENIPUNCTURE: CPT

## 2024-02-02 PROCEDURE — 76857 US EXAM PELVIC LIMITED: CPT

## 2024-02-02 PROCEDURE — 76857 US EXAM PELVIC LIMITED: CPT | Performed by: OBSTETRICS & GYNECOLOGY

## 2024-02-02 PROCEDURE — 82670 ASSAY OF TOTAL ESTRADIOL: CPT

## 2024-02-02 NOTE — PROGRESS NOTES
CYCLING NOTE - IVF #1 for secondary infertility and PCOS. OCP lead in, Antagonist /. ALL MEDS IM. Patient is on 6th day of meds.     Here for US and/or lab monitoring; relevant findings reviewed.    Patient stayed for nurse visit. Pain is 0/10. Patient aware to make sure she has enough medications to get her through Monday.   Team will contact patient later today with results and plan.    Lesa Hardin  02/02/2024  10:38 AM      [facility] PROVIDER NOTE  Ultrasound and/or labs reviewed at [facility].   Results for orders placed or performed in visit on 02/02/24 (from the past 96 hour(s))   Estradiol   Result Value Ref Range    Estradiol 832 pg/mL     Fri 2/2 (Day 9)   follitropin beta injection: Inject 200 Units once daily in the evening into the muscle   menotropins recon soln injection: Inject 150 Units once daily in the evening into the muscle   ganirelix syringe injection: Inject 250 mcg once daily in the morning under the skin    Sat 2/3 (Day 10)   follitropin beta injection: Inject 200 Units once daily in the evening into the muscle   menotropins recon soln injection: Inject 150 Units once daily in the evening into the muscle   ganirelix syringe injection: Inject 250 mcg once daily in the morning under the skin    Sun 2/4 (Day 11)   ganirelix syringe injection: Inject 250 mcg once daily in the morning under the skin    RTC in two days for Follicle scan and Estradiol.   Sofía Eisenberg  02/02/2024  1:14 PM      Patient aware to continue same doses of medications, and return on Sunday for repeat follicle scan, and E2. Patient confirmed she has enough medications to get her through Monday.      LESA HARDIN on 2/2/24 at 1:38 PM.

## 2024-02-04 ENCOUNTER — ANCILLARY PROCEDURE (OUTPATIENT)
Dept: ENDOCRINOLOGY | Facility: CLINIC | Age: 34
End: 2024-02-04
Payer: COMMERCIAL

## 2024-02-04 ENCOUNTER — APPOINTMENT (OUTPATIENT)
Dept: ENDOCRINOLOGY | Facility: CLINIC | Age: 34
End: 2024-02-04
Payer: COMMERCIAL

## 2024-02-04 DIAGNOSIS — N97.9 FEMALE INFERTILITY: ICD-10-CM

## 2024-02-04 LAB
ESTRADIOL SERPL-MCNC: 1869 PG/ML
PROGEST SERPL-MCNC: 0.6 NG/ML

## 2024-02-04 PROCEDURE — 36415 COLL VENOUS BLD VENIPUNCTURE: CPT

## 2024-02-04 PROCEDURE — 84144 ASSAY OF PROGESTERONE: CPT

## 2024-02-04 PROCEDURE — 82670 ASSAY OF TOTAL ESTRADIOL: CPT

## 2024-02-04 PROCEDURE — 76857 US EXAM PELVIC LIMITED: CPT | Performed by: OBSTETRICS & GYNECOLOGY

## 2024-02-04 PROCEDURE — 76857 US EXAM PELVIC LIMITED: CPT

## 2024-02-04 NOTE — PROGRESS NOTES
CYCLING NOTE    Here for US and/or lab monitoring for IVF #1; relevant findings reviewed.    Patient did not stay for discussion after monitoring,  Team will contact patient later today with results and plan.    Mathieu Murray  02/04/2024  9:43 AM    [facility] PROVIDER NOTE  Ultrasound and/or labs reviewed at [facility].   Results for orders placed or performed in visit on 02/02/24 (from the past 96 hour(s))   Estradiol   Result Value Ref Range    Estradiol 832 pg/mL     Sun 2/4 (Day 11)   follitropin beta injection: Inject 200 Units once daily in the evening into the muscle   menotropins recon soln injection: Inject 150 Units once daily in the evening into the muscle   ganirelix syringe injection: Inject 250 mcg once daily in the morning under the skin    Mon 2/5 (Day 12)   ganirelix syringe injection: Inject 250 mcg once daily in the morning under the skin    RTC in one day for Follicle scan and Estradiol and Progesterone  Above plan is pending E2 level.  May drop FSH if needed.   Sofía Eisenberg  02/04/2024  11:15 AM    My Chart message sent to patient with above noted plan. No changes to current doses after E2 level reviewed by Dr. Chavarria and Dr. Eisenberg.  MATHIEU MURRAY on 2/4/24 at 12:21 PM.

## 2024-02-05 ENCOUNTER — ANCILLARY PROCEDURE (OUTPATIENT)
Dept: ENDOCRINOLOGY | Facility: CLINIC | Age: 34
End: 2024-02-05
Payer: COMMERCIAL

## 2024-02-05 ENCOUNTER — PHARMACY VISIT (OUTPATIENT)
Dept: PHARMACY | Facility: CLINIC | Age: 34
End: 2024-02-05
Payer: COMMERCIAL

## 2024-02-05 DIAGNOSIS — N97.9 FEMALE INFERTILITY: ICD-10-CM

## 2024-02-05 LAB
ESTRADIOL SERPL-MCNC: 2708 PG/ML
PROGEST SERPL-MCNC: 0.6 NG/ML

## 2024-02-05 PROCEDURE — 76857 US EXAM PELVIC LIMITED: CPT | Performed by: OBSTETRICS & GYNECOLOGY

## 2024-02-05 PROCEDURE — 36415 COLL VENOUS BLD VENIPUNCTURE: CPT

## 2024-02-05 PROCEDURE — 84144 ASSAY OF PROGESTERONE: CPT

## 2024-02-05 PROCEDURE — RXMED WILLOW AMBULATORY MEDICATION CHARGE

## 2024-02-05 PROCEDURE — 82670 ASSAY OF TOTAL ESTRADIOL: CPT

## 2024-02-05 PROCEDURE — 76857 US EXAM PELVIC LIMITED: CPT

## 2024-02-05 RX ORDER — GANIRELIX ACETATE 250 UG/.5ML
INJECTION, SOLUTION SUBCUTANEOUS
Qty: 1 EACH | Refills: 0 | Status: SHIPPED | OUTPATIENT
Start: 2024-02-05 | End: 2024-02-08 | Stop reason: ALTCHOICE

## 2024-02-05 NOTE — PROGRESS NOTES
CYCLING NOTE - IVF #1 for PCOS. OCP lead in, Antagonist  . On day 8 of meds. Patient started antagonist on 2/1    Here for US and/or lab monitoring; relevant findings reviewed.    Patient stayed for nurse visit. Pain is 0/10.   Team will contact patient later today with results and plan.    Lesa Hardin  02/05/2024  8:41 AM      HUDD[facility] PROVIDER NOTE  Ultrasound and/or labs reviewed at [facility].   Results for orders placed or performed in visit on 02/05/24 (from the past 96 hour(s))   Progesterone   Result Value Ref Range    Progesterone 0.6 ng/mL   Estradiol   Result Value Ref Range    Estradiol 2,708 pg/mL     Mon 2/5 (Day 12)   follitropin beta injection: Inject 200 Units once daily in the evening into the muscle   menotropins recon soln injection: Inject 150 Units once daily in the evening into the muscle   ganirelix syringe injection: Inject 250 mcg once daily in the morning under the skin    Tue 2/6 (Day 13)   ganirelix syringe injection: Inject 250 mcg once daily in the morning under the skin    RTC in one day for Follicle scan and Estradiol and Progesterone.   Sd Reveles  02/05/2024  12:45 PM      Called patient to review MD plan. Patient aware to continue medications and return to clinic tomorrow for repeat follicle scan and E2, P4. Patient aware of polyp seen on US today, and will send message to Dr. Eisenberg to discuss options for removal before a transfer.      LESA HARDIN on 2/5/24 at 1:20 PM.

## 2024-02-06 ENCOUNTER — ANCILLARY PROCEDURE (OUTPATIENT)
Dept: ENDOCRINOLOGY | Facility: CLINIC | Age: 34
End: 2024-02-06
Payer: COMMERCIAL

## 2024-02-06 DIAGNOSIS — N97.9 FEMALE INFERTILITY: ICD-10-CM

## 2024-02-06 LAB
ESTRADIOL SERPL-MCNC: 3595 PG/ML
PROGEST SERPL-MCNC: 0.7 NG/ML

## 2024-02-06 PROCEDURE — 84144 ASSAY OF PROGESTERONE: CPT

## 2024-02-06 PROCEDURE — 76857 US EXAM PELVIC LIMITED: CPT | Performed by: OBSTETRICS & GYNECOLOGY

## 2024-02-06 PROCEDURE — 82670 ASSAY OF TOTAL ESTRADIOL: CPT

## 2024-02-06 PROCEDURE — 36415 COLL VENOUS BLD VENIPUNCTURE: CPT

## 2024-02-06 PROCEDURE — 76857 US EXAM PELVIC LIMITED: CPT

## 2024-02-06 NOTE — PROGRESS NOTES
CYCLING NOTE: CYCLING NOTE - IVF #1 for PCOS. OCP lead in, Antagonist  . On day 9 of meds. Patient started antagonist on 2/1     Here for US and/or lab monitoring; relevant findings reviewed.  Pt report feeling very crampy today; reviewed US - likely trigger tonight; has another day of meds if needed; Trigger Instructions given and reviewed.    polyp noted yesterday    Patient stayed for nurse visit. Pain is 0/10  Team will contact patient later today with results and plan.    Patti Kapadia  02/06/2024  8:02 AM      [facility] PROVIDER NOTE - TRIGGER  Ultrasound and/or labs reviewed at [facility]. Patient ready for trigger.   Results for orders placed or performed in visit on 02/06/24 (from the past 96 hour(s))   Estradiol   Result Value Ref Range    Estradiol 3,595 pg/mL   Progesterone   Result Value Ref Range    Progesterone 0.7 ng/mL     Tue 2/6 (Day 13)   ganirelix syringe injection: Inject 250 mcg once daily in the morning under the skin   leuprolide injection: Inject 4 mg if needed under the skin      Will trigger tonight at 9:00 pm for retrieval at 9:00am on 2/8 .  and Lupron trigger - RTC in REIRTC: one day for Progesterone and LH.     Post trigger follow up tomorrow: Progesterone and LH.   Sofía Eisenberg  02/06/2024  12:36 PM      TC to pt -reviewed MD plan above; reviewed Lupron timing and administration in the belly; pt has appt for labs tomorrow; understands no further IVF stim meds.   All questions answered    02/06/24 at 2:19 PM - Patti Kapadia RN

## 2024-02-07 ENCOUNTER — CLINICAL SUPPORT (OUTPATIENT)
Dept: ENDOCRINOLOGY | Facility: CLINIC | Age: 34
End: 2024-02-07
Payer: COMMERCIAL

## 2024-02-07 ENCOUNTER — PREP FOR PROCEDURE (OUTPATIENT)
Dept: ENDOCRINOLOGY | Facility: CLINIC | Age: 34
End: 2024-02-07

## 2024-02-07 DIAGNOSIS — N97.9 FEMALE INFERTILITY: ICD-10-CM

## 2024-02-07 LAB
LH SERPL-ACNC: 17.2 IU/L
PROGEST SERPL-MCNC: 3.6 NG/ML

## 2024-02-07 PROCEDURE — 36415 COLL VENOUS BLD VENIPUNCTURE: CPT

## 2024-02-07 PROCEDURE — 84144 ASSAY OF PROGESTERONE: CPT

## 2024-02-07 PROCEDURE — 83002 ASSAY OF GONADOTROPIN (LH): CPT

## 2024-02-07 RX ORDER — MORPHINE SULFATE 2 MG/ML
2 INJECTION, SOLUTION INTRAMUSCULAR; INTRAVENOUS AS NEEDED
Status: CANCELLED | OUTPATIENT
Start: 2024-02-07

## 2024-02-07 RX ORDER — ACETAMINOPHEN 325 MG/1
650 TABLET ORAL ONCE AS NEEDED
Status: CANCELLED | OUTPATIENT
Start: 2024-02-07

## 2024-02-07 RX ORDER — KETOROLAC TROMETHAMINE 30 MG/ML
30 INJECTION, SOLUTION INTRAMUSCULAR; INTRAVENOUS ONCE AS NEEDED
Status: CANCELLED | OUTPATIENT
Start: 2024-02-07 | End: 2024-02-12

## 2024-02-07 RX ORDER — KETOROLAC TROMETHAMINE 30 MG/ML
30 INJECTION, SOLUTION INTRAMUSCULAR; INTRAVENOUS ONCE
Status: CANCELLED | OUTPATIENT
Start: 2024-02-07 | End: 2024-02-07

## 2024-02-07 RX ORDER — HYDROCODONE BITARTRATE AND ACETAMINOPHEN 5; 325 MG/1; MG/1
1 TABLET ORAL ONCE AS NEEDED
Status: CANCELLED | OUTPATIENT
Start: 2024-02-07

## 2024-02-07 RX ORDER — ONDANSETRON HYDROCHLORIDE 2 MG/ML
4 INJECTION, SOLUTION INTRAVENOUS AS NEEDED
Status: CANCELLED | OUTPATIENT
Start: 2024-02-07

## 2024-02-07 RX ORDER — OXYCODONE AND ACETAMINOPHEN 5; 325 MG/1; MG/1
1 TABLET ORAL EVERY 6 HOURS PRN
Status: CANCELLED | OUTPATIENT
Start: 2024-02-07

## 2024-02-07 NOTE — PROGRESS NOTES
CYCLING NOTE    Here for US and/or lab monitoring; relevant findings reviewed.    Patient having lower abdominal intermittent cramping. Patient reports Lupron trigger taken on time at 2100 last night 2/6/24.   Team will contact patient later today with results and plan.    Patti Kapadia  02/07/2024  7:08 AM      HUDDLE PROVIDER NOTE  [facility]   Results for orders placed or performed in visit on 02/07/24 (from the past 96 hour(s))   Luteinizing Hormone (LH)   Result Value Ref Range    Luteinizing Hormone 17.2 IU/L   Progesterone   Result Value Ref Range    Progesterone 3.6 ng/mL         Wed 2/7 (Day 14)   leuprolide injection: Inject 2 mg if needed under the skin      RTC in one day for  egg retrieval  and  no labs .   Sofía Eisenberg  02/07/2024  12:48 PM    TC to pt: left detailed message with pt with MD plan above to take wnd Lupron 40 units on the insulin syringe at 2100 tonight; please call back to confirm receipt of message.    02/07/24 at 1:46 PM - Patti Kapadia RN    Pt called back and confirmed receiving above information. Plan with retrieval for tomorrow.  Patti Kapadia, 02/07/2024 & 1:47 PM

## 2024-02-08 ENCOUNTER — ANESTHESIA (OUTPATIENT)
Dept: ENDOCRINOLOGY | Facility: CLINIC | Age: 34
End: 2024-02-08
Payer: COMMERCIAL

## 2024-02-08 ENCOUNTER — TELEPHONE (OUTPATIENT)
Dept: ENDOCRINOLOGY | Facility: CLINIC | Age: 34
End: 2024-02-08

## 2024-02-08 ENCOUNTER — HOSPITAL ENCOUNTER (OUTPATIENT)
Dept: ENDOCRINOLOGY | Facility: CLINIC | Age: 34
Discharge: HOME | End: 2024-02-08
Payer: COMMERCIAL

## 2024-02-08 ENCOUNTER — ANESTHESIA EVENT (OUTPATIENT)
Dept: ENDOCRINOLOGY | Facility: CLINIC | Age: 34
End: 2024-02-08
Payer: COMMERCIAL

## 2024-02-08 VITALS
DIASTOLIC BLOOD PRESSURE: 76 MMHG | WEIGHT: 293 LBS | OXYGEN SATURATION: 94 % | BODY MASS INDEX: 41.95 KG/M2 | HEART RATE: 73 BPM | HEIGHT: 70 IN | SYSTOLIC BLOOD PRESSURE: 121 MMHG | TEMPERATURE: 97.9 F | RESPIRATION RATE: 18 BRPM

## 2024-02-08 DIAGNOSIS — Z31.83 ENCOUNTER FOR ASSISTED REPRODUCTIVE FERTILITY CYCLE: ICD-10-CM

## 2024-02-08 PROCEDURE — 89261 SPERM ISOLATION COMPLEX: CPT | Performed by: OBSTETRICS & GYNECOLOGY

## 2024-02-08 PROCEDURE — 3700000002 HC GENERAL ANESTHESIA TIME - EACH INCREMENTAL 1 MINUTE

## 2024-02-08 PROCEDURE — 58970 RETRIEVAL OF OOCYTE: CPT | Performed by: OBSTETRICS & GYNECOLOGY

## 2024-02-08 PROCEDURE — 3700000001 HC GENERAL ANESTHESIA TIME - INITIAL BASE CHARGE

## 2024-02-08 PROCEDURE — 89272 EXTENDED CULTURE OF OOCYTES: CPT | Performed by: OBSTETRICS & GYNECOLOGY

## 2024-02-08 PROCEDURE — 76948 ECHO GUIDE OVA ASPIRATION: CPT | Performed by: OBSTETRICS & GYNECOLOGY

## 2024-02-08 PROCEDURE — 89258 CRYOPRESERVATION EMBRYO(S): CPT | Performed by: OBSTETRICS & GYNECOLOGY

## 2024-02-08 PROCEDURE — 2500000004 HC RX 250 GENERAL PHARMACY W/ HCPCS (ALT 636 FOR OP/ED): Performed by: NURSE ANESTHETIST, CERTIFIED REGISTERED

## 2024-02-08 PROCEDURE — 89250 CULTR OOCYTE/EMBRYO <4 DAYS: CPT | Performed by: OBSTETRICS & GYNECOLOGY

## 2024-02-08 PROCEDURE — 89280 ASSIST OOCYTE FERTILIZATION: CPT | Performed by: OBSTETRICS & GYNECOLOGY

## 2024-02-08 RX ORDER — FENTANYL CITRATE 50 UG/ML
INJECTION, SOLUTION INTRAMUSCULAR; INTRAVENOUS AS NEEDED
Status: DISCONTINUED | OUTPATIENT
Start: 2024-02-08 | End: 2024-02-08

## 2024-02-08 RX ORDER — SODIUM CHLORIDE, SODIUM LACTATE, POTASSIUM CHLORIDE, CALCIUM CHLORIDE 600; 310; 30; 20 MG/100ML; MG/100ML; MG/100ML; MG/100ML
INJECTION, SOLUTION INTRAVENOUS CONTINUOUS PRN
Status: DISCONTINUED | OUTPATIENT
Start: 2024-02-08 | End: 2024-02-08

## 2024-02-08 RX ORDER — GLYCOPYRROLATE 0.2 MG/ML
INJECTION INTRAMUSCULAR; INTRAVENOUS AS NEEDED
Status: DISCONTINUED | OUTPATIENT
Start: 2024-02-08 | End: 2024-02-08

## 2024-02-08 RX ORDER — KETOROLAC TROMETHAMINE 30 MG/ML
INJECTION, SOLUTION INTRAMUSCULAR; INTRAVENOUS AS NEEDED
Status: DISCONTINUED | OUTPATIENT
Start: 2024-02-08 | End: 2024-02-08

## 2024-02-08 RX ORDER — MIDAZOLAM HYDROCHLORIDE 1 MG/ML
INJECTION, SOLUTION INTRAMUSCULAR; INTRAVENOUS AS NEEDED
Status: DISCONTINUED | OUTPATIENT
Start: 2024-02-08 | End: 2024-02-08

## 2024-02-08 RX ORDER — PROPOFOL 10 MG/ML
INJECTION, EMULSION INTRAVENOUS CONTINUOUS PRN
Status: DISCONTINUED | OUTPATIENT
Start: 2024-02-08 | End: 2024-02-08

## 2024-02-08 RX ORDER — PROPOFOL 10 MG/ML
INJECTION, EMULSION INTRAVENOUS AS NEEDED
Status: DISCONTINUED | OUTPATIENT
Start: 2024-02-08 | End: 2024-02-08

## 2024-02-08 RX ORDER — ONDANSETRON HYDROCHLORIDE 2 MG/ML
INJECTION, SOLUTION INTRAVENOUS AS NEEDED
Status: DISCONTINUED | OUTPATIENT
Start: 2024-02-08 | End: 2024-02-08

## 2024-02-08 RX ORDER — CEFAZOLIN 1 G/1
INJECTION, POWDER, FOR SOLUTION INTRAVENOUS AS NEEDED
Status: DISCONTINUED | OUTPATIENT
Start: 2024-02-08 | End: 2024-02-08

## 2024-02-08 RX ORDER — DEXAMETHASONE SODIUM PHOSPHATE 4 MG/ML
INJECTION, SOLUTION INTRA-ARTICULAR; INTRALESIONAL; INTRAMUSCULAR; INTRAVENOUS; SOFT TISSUE AS NEEDED
Status: DISCONTINUED | OUTPATIENT
Start: 2024-02-08 | End: 2024-02-08

## 2024-02-08 RX ADMIN — MIDAZOLAM 2 MG: 1 INJECTION INTRAMUSCULAR; INTRAVENOUS at 09:10

## 2024-02-08 RX ADMIN — PROPOFOL 100 MG: 10 INJECTION, EMULSION INTRAVENOUS at 09:10

## 2024-02-08 RX ADMIN — DEXAMETHASONE SODIUM PHOSPHATE 8 MG: 4 INJECTION, SOLUTION INTRA-ARTICULAR; INTRALESIONAL; INTRAMUSCULAR; INTRAVENOUS; SOFT TISSUE at 09:23

## 2024-02-08 RX ADMIN — GLYCOPYRROLATE 0.2 MG: 0.2 INJECTION, SOLUTION INTRAMUSCULAR; INTRAVENOUS at 09:10

## 2024-02-08 RX ADMIN — SODIUM CHLORIDE, SODIUM LACTATE, POTASSIUM CHLORIDE, AND CALCIUM CHLORIDE: .6; .31; .03; .02 INJECTION, SOLUTION INTRAVENOUS at 09:08

## 2024-02-08 RX ADMIN — ONDANSETRON 4 MG: 2 INJECTION, SOLUTION INTRAMUSCULAR; INTRAVENOUS at 09:43

## 2024-02-08 RX ADMIN — KETOROLAC TROMETHAMINE 30 MG: 30 INJECTION INTRAMUSCULAR; INTRAVENOUS at 09:47

## 2024-02-08 RX ADMIN — FENTANYL CITRATE 100 MCG: 50 INJECTION, SOLUTION INTRAMUSCULAR; INTRAVENOUS at 09:10

## 2024-02-08 RX ADMIN — CEFAZOLIN 3 G: 330 INJECTION, POWDER, FOR SOLUTION INTRAMUSCULAR; INTRAVENOUS at 09:10

## 2024-02-08 RX ADMIN — PROPOFOL 200 MCG/KG/MIN: 10 INJECTION, EMULSION INTRAVENOUS at 09:10

## 2024-02-08 ASSESSMENT — PAIN SCALES - GENERAL
PAINLEVEL_OUTOF10: 0 - NO PAIN
PAINLEVEL_OUTOF10: 0 - NO PAIN
PAINLEVEL_OUTOF10: 2
PAINLEVEL_OUTOF10: 0 - NO PAIN
PAINLEVEL_OUTOF10: 2

## 2024-02-08 ASSESSMENT — PAIN - FUNCTIONAL ASSESSMENT
PAIN_FUNCTIONAL_ASSESSMENT: 0-10

## 2024-02-08 ASSESSMENT — COLUMBIA-SUICIDE SEVERITY RATING SCALE - C-SSRS
2. HAVE YOU ACTUALLY HAD ANY THOUGHTS OF KILLING YOURSELF?: NO
6. HAVE YOU EVER DONE ANYTHING, STARTED TO DO ANYTHING, OR PREPARED TO DO ANYTHING TO END YOUR LIFE?: NO
2. HAVE YOU ACTUALLY HAD ANY THOUGHTS OF KILLING YOURSELF?: NO
1. IN THE PAST MONTH, HAVE YOU WISHED YOU WERE DEAD OR WISHED YOU COULD GO TO SLEEP AND NOT WAKE UP?: NO
1. IN THE PAST MONTH, HAVE YOU WISHED YOU WERE DEAD OR WISHED YOU COULD GO TO SLEEP AND NOT WAKE UP?: NO
6. HAVE YOU EVER DONE ANYTHING, STARTED TO DO ANYTHING, OR PREPARED TO DO ANYTHING TO END YOUR LIFE?: NO

## 2024-02-08 ASSESSMENT — PAIN DESCRIPTION - DESCRIPTORS
DESCRIPTORS: CRAMPING
DESCRIPTORS: CRAMPING

## 2024-02-08 NOTE — DISCHARGE INSTRUCTIONS
[facility]  [address]. [place]  [address]   Tel: [phone]   Fax: [phone]    Home Going Instructions after Egg Retrieval:     Activity:   We do not recommend exercise on the day of or the day after your egg retrieval.   You can resume normal activities in 2-3 days, but please avoid exercise that involves jumping or bouncing.  If you are not having a fresh embryo transfer, you will likely start your period within 1-2 weeks and can resume all normal exercise at that time.   You may resume intercourse one week after your retrieval.     Anesthesia:  Drink small amounts of liquids initially and then slowly increase your intake of food on the day of your procedure. Drinking fluids will keep your bowels regular.   Avoid foods that are sweet, spicy or hard to digest today.  If you feel nauseated, rest your stomach for one hour, and then try drinking clear liquids again.  You may take a stool softener or miralax/milk of magnesia to help with constipation that may occur after anesthesia.  Please make sure a responsible adult is with you for at least 24 hours after surgery and do not drive or make important decisions during this time. Anesthesia may affect your judgment, coordination, and reaction time.    Follow up:  ALWAYS follow up with your primary nurse a few days after your procedure to check in and make sure you know what your next steps are.     When to call your provider:  Vaginal bleeding that is more than a normal period that doesn't taper down within 6 hours.  Saturating a sanitary pad in 1-2 hours.  Any clots the size of an egg or bigger.  Fever of 100.4 or higher with chills.  Unusual or foul smelling discharge.  Discomfort from abdominal distension.     Notify your Physician's office if you develop any signs of Ovarian Hyperstimulation (OHSS):    -Abdominal bloating   -Rapid weight gain of 5-10 lbs. in 1-2 days  -Swelling in  hands and feet  -Severe abdominal pain  -Shortness of breath   -Difficulty urinating or decreased urinary output   -Diarrhea    -Persistent nausea and vomiting  -Dizziness     These signs and symptoms can occur for up to 2 weeks following your oocyte retrieval. Call [phone] to speak with a Physician or Nurse if you have any concerns.    Yeni Khanna    8:26 AM    [facility]  [address]. [place], [place]     IVF LAB EMBRYO UPDATE PROTOCOL    The IVF Lab will call on the following days:    Retrieval Day: The doctor performing the procedure will tell you the total number of eggs collected. There will be no update from the IVF Lab on retrieval day.   Day 1: The IVF Lab will call you between 9:00-11:00 with an egg fertilization update.    Day 5: The IVF Lab will call you for an update on how your embryos have developed and how many have reached the blastocyst stage.    Day 6/7: You will receive an email from the IVF Lab with your summary report indicating the number of embryos that were able to be frozen. If you are doing genetic testing on your embryos you will receive a phone call regarding the number of embryos biopsied and frozen.    Yeni Khanna    8:26 AM    [facility]  [address]. [place]  [address]  Tel: [phone]   Fax: [phone]    Frozen Embryo Transfer Instructions    After your egg retrieval, follow up with your IVF nurse within 3-4 days Monday-Friday regarding the plan for your frozen embryo transfer (FET) cycle. Your IVF nurse will order and review with you the medications you will be using for the cycle.     You will also be sent an email from [employer] to fill out your Frozen Embryo Treatment Plan at this time. If you have not completed the frozen embryo treatment by the MORNING of your lining check appointment, we will not allow you to move forward  with your FET that cycle.    Please call the office on the first full flow day of your period to speak with your IVF nurse.  If the first day of your cycle begins over the weekend, please call the office Monday morning.   Depending on our embryo transfer schedule and the medication protocol your doctor prescribes, you may not start your embryo transfer cycle medications immediately after your period starts. If appropriate for you, your doctor may have you start birth control to help time your embryo transfer cycle which means there may be a short delay in starting your FET cycle.   If you did PGT testing of your embryos, you will not start a frozen embryo transfer cycle until we have received your results. It can take up to a few weeks to receive these results. If appropriate, we may have you take birth control from the time your period starts until the results are ready.     You will be set up for a lining check ultrasound and labs mid cycle, and given a tentative embryo transfer date. You may require multiple lining checks at the discretion of your provider. After your provider determines your lining is adequate, we will determine what date you will begin your progesterone support and confirm the day of your embryo transfer.     The embryology lab will contact you the day before with the time of your embryo transfer and when to arrive.     Please make sure to drink 20oz. of water one hour before your scheduled arrival time.   You do not need to be on bedrest following your embryo transfer. You may resume normal activity following embryo transfer.   You will have your blood drawn on the day of transfer to check a progesterone level and you will receive a call that afternoon with your results.   Do not discontinue any of your medications unless instructed to by your provider.      Yeni Khanna RN    8:26 AM

## 2024-02-08 NOTE — PROGRESS NOTES
"Patient ID: Nicky Herbert \"Oly" is a 33 y.o. female.    Egg Retrieval    Date/Time: 2/8/2024 9:46 AM    Performed by: Sd Reveles MD  Authorized by: Sofía Eisenberg MD    Consent:     Consent obtained:  Verbal and written    Consent given by:  Patient    Procedure risks and benefits discussed: yes      Patient questions answered: yes      Patient agrees, verbalizes understanding, and wants to proceed: yes      Educational handouts given: yes      Instructions and paperwork completed: yes    Procedure:     Anesthesia:  MAC    Pelvic exam performed: Yes      Cervix cleaned and prepped: Yes      Speculum placed in vagina: Yes      Tenaculum applied to cervix: No      Ultrasound guidance: Yes      Left ovary:  Follicle present    Right ovary:  Follicle present    Pt. post-ovulatory: No      Speculum type: Andrew      Retrieval method: transvaginal      Needle inserted: Yes      Ovaries aspirated: Yes      Free fluid in pelvis: No    Post-procedure:     Patient tolerated procedure well: yes    Comments:      Preop diagnosis: Female infertility   Post op diagnosis: Same  Assistant: Dr. Oakes    IV Fluids: 500  cc  EBL: 5  cc  UOP: Not recorded    Specimen: Oocytes  Complications: None    Number of Oocytes right ovary: 3   Ovarian accèss (right): Easy  Number of Oocytes left ovary: 9  Ovarian access (left): Easy  Endometrial thickness: n/a  Needle type: Single  Additional notes:     Attending Attestation: I was physically present for key and critical portions performed by the fellow. I reviewed the fellow's documentation and discussed the patient with the fellow. I agree with the fellow's medical decision making as documented in the fellow's note.       "

## 2024-02-08 NOTE — NURSING NOTE
Patient discharged to home in stable condition via wheelchair to [facility]: Partner's car. Patient ambulated to wheelchair without complication, instructed to call office if unable to void within 6 hours of discharge. Discharge instructions given and concerns addressed.

## 2024-02-08 NOTE — ANESTHESIA PREPROCEDURE EVALUATION
"Patient: Nicky Herbert \"Yasmeen\"    Procedure Information       Date/Time: 02/08/24 0900    Scheduled providers: Sd Reveles MD; Glenny Cao MD; MACHO oGrdon    Procedure: EGG RETRIEVAL    Location: [facility]; [facility]            Relevant Problems   Anesthesia (within normal limits)      Cardiovascular   (+) Paroxysmal supraventricular tachycardia      Endocrine   (+) Hypothyroid   (+) Obesity      GI   (+) Gastric ulcer   (+) Hiatal hernia      Neuro/Psych   (+) Anxiety      Pulmonary   (+) VIK (obstructive sleep apnea)      Hematology   (+) Anemia      Musculoskeletal   (+) Osteoarthritis of knee      Infectious Disease   (+) Genital herpes     There were no vitals filed for this visit.    Past Surgical History:   Procedure Laterality Date    OTHER SURGICAL HISTORY  10/24/2014    Cardiac Catheter His Ablation     Past Medical History:   Diagnosis Date    Cutaneous abscess of back (any part, except buttock) 12/23/2015    Abscess of back    Personal history of other diseases of the nervous system and sense organs 03/24/2016    History of acute otitis media       Current Outpatient Medications:     chorionic gonadotropin (Pregnyl) 10,000 unit injection, Reconstitute according to instructions and inject 10,000 units (1 mL) into the muscle as a one time dose, as directed per provider for trigger., Disp: 1 each, Rfl: 0    ergocalciferol (Vitamin D-2) 1.25 MG (02766 UT) capsule, Take 1 capsule (1,250 mcg) by mouth 1 (one) time per week., Disp: , Rfl:     ferrous sulfate 325 (65 Fe) MG tablet, Take 1 tablet by mouth once daily at bedtime. TAKE WITH VITAMIN C TABLET (OR A GLASS OF ORANGE JUICE), Disp: , Rfl:     folic acid (Folvite) 400 mcg tablet, Take 1 tablet (0.4 mg) by mouth once daily., Disp: , Rfl:     follitropin beta (Follistim AQ) 600 unit/0.72 mL injection, Inject 200 units into the muscle once daily in the evening. Mix with menopur and " "inject into the muscle once daily in the evening., Disp: 3 each, Rfl: 2    ganirelix (Orgalutran) 250 mcg/0.5 mL syringe injection, Inject 250 mcg (1 syringe) under the skin once daily as directed per provider., Disp: 5 each, Rfl: 2    ganirelix (Orgalutran) 250 mcg/0.5 mL syringe injection, Inject 250 mcg (1 syringe) under the skin once daily as directed per provider., Disp: 1 each, Rfl: 0    insulin syringe-needle U-100 1 mL 28 gauge x 1/2\" syringe, Use as directed for Lupron trigger, Disp: 2 each, Rfl: 0    leuprolide (Lupron) 1 mg/0.2 mL injection, Using an insulin syringe, draw up 80 units and inject under the skin as a one time dose, as directed per provider for trigger., Disp: 1 kit, Rfl: 0    levothyroxine (Synthroid, Levoxyl) 50 mcg tablet, Take 1 tablet (50 mcg) by mouth once daily., Disp: , Rfl:     meclizine (Antivert) 25 mg tablet, Take 1 tablet (25 mg) by mouth 3 times a day., Disp: , Rfl:     menotropins (Menopur) 75 unit recon soln injection, Inject 150 Units into the muscle once daily in the evening. Mix with follistim as instructed and inject into the muscle once daily in the evening as directed, Disp: 20 each, Rfl: 2    metFORMIN  mg 24 hr tablet, Take by mouth. Take 1 tablet by mouth with the evening meal, take 1 tablet at dinner the first week, 2 tablets the second week, 3 tablets the third week, Disp: , Rfl:     Vicki 0.25-35 mg-mcg tablet, TAKE 1 TABLET BY MOUTH ONCE DAILY. TAKE ACTIVE PILLS ONLY AS DIRECTED PER PROVIDER, Disp: 84 tablet, Rfl: 0    needle, disp, 25 gauge 25 gauge x 1 1/2\" needle, Us as directed to administer medications into the muscle (IM), Disp: 30 each, Rfl: 3    omeprazole (PriLOSEC) 40 mg DR capsule, Take 1 capsule (40 mg) by mouth once daily., Disp: , Rfl:     progesterone (Prometrium) 100 mg capsule, Insert 1 capsule (100 mg) into the vagina 2 times a day., Disp: , Rfl:     syringe, disposable, (BD Safety-Esthela with Luer-Esthela) 3 mL syringe, Use as directed to inject " "medications IM, Disp: 30 each, Rfl: 3    valACYclovir (Valtrex) 500 mg tablet, Take 1 tablet (500 mg) by mouth once daily., Disp: , Rfl:     venlafaxine (Effexor) 75 mg tablet, Take 1 tablet (75 mg) by mouth 2 times a day., Disp: , Rfl:   Prior to Admission medications    Medication Sig Start Date End Date Taking? Authorizing Provider   chorionic gonadotropin (Pregnyl) 10,000 unit injection Reconstitute according to instructions and inject 10,000 units (1 mL) into the muscle as a one time dose, as directed per provider for trigger. 1/11/24 4/10/24  Shannon Juarez MD   ergocalciferol (Vitamin D-2) 1.25 MG (43646 UT) capsule Take 1 capsule (1,250 mcg) by mouth 1 (one) time per week. 9/21/23   Historical Provider, MD   ferrous sulfate 325 (65 Fe) MG tablet Take 1 tablet by mouth once daily at bedtime. TAKE WITH VITAMIN C TABLET (OR A GLASS OF ORANGE JUICE) 7/18/23   Historical Provider, MD   folic acid (Folvite) 400 mcg tablet Take 1 tablet (0.4 mg) by mouth once daily.    Historical Provider, MD   follitropin beta (Follistim AQ) 600 unit/0.72 mL injection Inject 200 units into the muscle once daily in the evening. Mix with menopur and inject into the muscle once daily in the evening. 1/11/24 4/10/24  Shannon Juarez MD   ganirelix (Orgalutran) 250 mcg/0.5 mL syringe injection Inject 250 mcg (1 syringe) under the skin once daily as directed per provider. 1/11/24 4/10/24  Shannon Juarez MD   ganirelix (Orgalutran) 250 mcg/0.5 mL syringe injection Inject 250 mcg (1 syringe) under the skin once daily as directed per provider. 2/5/24   Sd Reveles MD   insulin syringe-needle U-100 1 mL 28 gauge x 1/2\" syringe Use as directed for Lupron trigger 1/11/24   Shannon Juarez MD   leuprolide (Lupron) 1 mg/0.2 mL injection Using an insulin syringe, draw up 80 units and inject under the skin as a one time dose, as directed per provider for trigger. 1/11/24 4/10/24  Shannon Juarez MD   levothyroxine (Synthroid, " "Levoxyl) 50 mcg tablet Take 1 tablet (50 mcg) by mouth once daily. 5/26/17   Historical Provider, MD   meclizine (Antivert) 25 mg tablet Take 1 tablet (25 mg) by mouth 3 times a day. 8/8/17   Historical Provider, MD   menotropins (Menopur) 75 unit recon soln injection Inject 150 Units into the muscle once daily in the evening. Mix with follistim as instructed and inject into the muscle once daily in the evening as directed 1/11/24 4/10/24  Shannon Juarez MD   metFORMIN  mg 24 hr tablet Take by mouth. Take 1 tablet by mouth with the evening meal, take 1 tablet at dinner the first week, 2 tablets the second week, 3 tablets the third week 5/2/17   Historical Provider, MD   Vicki 0.25-35 mg-mcg tablet TAKE 1 TABLET BY MOUTH ONCE DAILY. TAKE ACTIVE PILLS ONLY AS DIRECTED PER PROVIDER 1/5/24   Shannon Juarez MD   needle, disp, 25 gauge 25 gauge x 1 1/2\" needle Us as directed to administer medications into the muscle (IM) 1/11/24   Shannon Juarez MD   omeprazole (PriLOSEC) 40 mg DR capsule Take 1 capsule (40 mg) by mouth once daily.    Historical Provider, MD   progesterone (Prometrium) 100 mg capsule Insert 1 capsule (100 mg) into the vagina 2 times a day. 5/30/17   Historical Provider, MD   syringe, disposable, (BD Safety-Esthela with Luer-Esthela) 3 mL syringe Use as directed to inject medications IM 1/11/24   Shannon Juarez MD   valACYclovir (Valtrex) 500 mg tablet Take 1 tablet (500 mg) by mouth once daily.    Historical Provider, MD   venlafaxine (Effexor) 75 mg tablet Take 1 tablet (75 mg) by mouth 2 times a day.    Historical Provider, MD     No Known Allergies  Social History     Tobacco Use    Smoking status: Never    Smokeless tobacco: Never   Substance Use Topics    Alcohol use: Not Currently         Chemistry    Lab Results   Component Value Date/Time     12/29/2023 1313    K 4.5 12/29/2023 1313     12/29/2023 1313    CO2 29 12/29/2023 1313    BUN 12 12/29/2023 1313    CREATININE 0.59 " "12/29/2023 1313    Lab Results   Component Value Date/Time    CALCIUM 8.9 12/29/2023 1313    ALKPHOS 82 12/29/2023 1313    AST 15 12/29/2023 1313    ALT 15 12/29/2023 1313    BILITOT 0.3 12/29/2023 1313          Lab Results   Component Value Date/Time    WBC 8.4 12/29/2023 1313    HGB 11.0 (L) 12/29/2023 1313    HCT 38.5 01/18/2024 0857     12/29/2023 1313     No results found for: \"PROTIME\", \"PTT\", \"INR\"  No results found for this or any previous visit (from the past 4464 hour(s)).  No results found for this or any previous visit from the past 1095 days.    Clinical information reviewed:    Allergies                NPO Detail:  No data recorded     PHYSICAL EXAM    Anesthesia Plan    History of general anesthesia?: yes  History of complications of general anesthesia?: no    ASA 3     general         "

## 2024-02-08 NOTE — PROGRESS NOTES
IVF Procedure Area H&P    Ms. Nicky Herbert is a 33 y.o. F who presents for egg retrieval under anesthesia.   Interval history reviewed and affirmed as up to date.      MedHx:   Past Medical History:   Diagnosis Date    Acid reflux     patient reports it is mild    Cutaneous abscess of back (any part, except buttock) 12/23/2015    Abscess of back    Personal history of other diseases of the nervous system and sense organs 03/24/2016    History of acute otitis media    Polycystic ovary syndrome 2013       SurgHx:   Past Surgical History:   Procedure Laterality Date    ABDOMINAL SURGERY  2022    BARIATRIC SURGERY  2020    OTHER SURGICAL HISTORY  10/24/2014    Cardiac Catheter His Ablation       Meds:   Current Outpatient Medications on File Prior to Encounter   Medication Sig Dispense Refill    ergocalciferol (Vitamin D-2) 1.25 MG (69570 UT) capsule Take 1 capsule (1,250 mcg) by mouth 1 (one) time per week.      ferrous sulfate 325 (65 Fe) MG tablet Take 1 tablet by mouth once daily at bedtime. TAKE WITH VITAMIN C TABLET (OR A GLASS OF ORANGE JUICE)      folic acid (Folvite) 400 mcg tablet Take 1 tablet (0.4 mg) by mouth once daily.      Vicki 0.25-35 mg-mcg tablet TAKE 1 TABLET BY MOUTH ONCE DAILY. TAKE ACTIVE PILLS ONLY AS DIRECTED PER PROVIDER 84 tablet 0    omeprazole (PriLOSEC) 40 mg DR capsule Take 1 capsule (40 mg) by mouth once daily.      valACYclovir (Valtrex) 500 mg tablet Take 1 tablet (500 mg) by mouth once daily.      venlafaxine (Effexor) 75 mg tablet Take 1 tablet (75 mg) by mouth 2 times a day.      [DISCONTINUED] chorionic gonadotropin (Pregnyl) 10,000 unit injection Reconstitute according to instructions and inject 10,000 units (1 mL) into the muscle as a one time dose, as directed per provider for trigger. 1 each 0    [DISCONTINUED] follitropin beta (Follistim AQ) 600 unit/0.72 mL injection Inject 200 units into the muscle once daily in the evening. Mix with menopur and inject into the muscle  "once daily in the evening. 3 each 2    [DISCONTINUED] ganirelix (Orgalutran) 250 mcg/0.5 mL syringe injection Inject 250 mcg (1 syringe) under the skin once daily as directed per provider. 5 each 2    [DISCONTINUED] ganirelix (Orgalutran) 250 mcg/0.5 mL syringe injection Inject 250 mcg (1 syringe) under the skin once daily as directed per provider. 1 each 0    [DISCONTINUED] insulin syringe-needle U-100 1 mL 28 gauge x 1/2\" syringe Use as directed for Lupron trigger 2 each 0    [DISCONTINUED] leuprolide (Lupron) 1 mg/0.2 mL injection Using an insulin syringe, draw up 80 units and inject under the skin as a one time dose, as directed per provider for trigger. 1 kit 0    [DISCONTINUED] levothyroxine (Synthroid, Levoxyl) 50 mcg tablet Take 1 tablet (50 mcg) by mouth once daily.      [DISCONTINUED] meclizine (Antivert) 25 mg tablet Take 1 tablet (25 mg) by mouth 3 times a day.      [DISCONTINUED] menotropins (Menopur) 75 unit recon soln injection Inject 150 Units into the muscle once daily in the evening. Mix with follistim as instructed and inject into the muscle once daily in the evening as directed 20 each 2    [DISCONTINUED] metFORMIN  mg 24 hr tablet Take by mouth. Take 1 tablet by mouth with the evening meal, take 1 tablet at dinner the first week, 2 tablets the second week, 3 tablets the third week      [DISCONTINUED] needle, disp, 25 gauge 25 gauge x 1 1/2\" needle Us as directed to administer medications into the muscle (IM) 30 each 3    [DISCONTINUED] progesterone (Prometrium) 100 mg capsule Insert 1 capsule (100 mg) into the vagina 2 times a day.      [DISCONTINUED] syringe, disposable, (BD Safety-Esthela with Luer-Esthela) 3 mL syringe Use as directed to inject medications IM 30 each 3     No current facility-administered medications on file prior to encounter.       Allergies: No Known Allergies    ROS: negative apart from what is indicated above    PE:   Gen: AA x 3   Resp: No labored breathing  Abdomen: " soft, non-tender, non-distended    A/P: Ms. Nicky Herbert is a 33 y.o. F who presents for above procedure under anesthesia in the IVF procedure area. Okay to proceed with above stated procedure.    Brittany Oakes

## 2024-02-08 NOTE — TELEPHONE ENCOUNTER
Called pharmacy back to let them know patient has completed her retrieval cycle and will no longer need those medications. If patient were to proceed with another cycle, we would place new orders.      LESA HARDIN on 2/8/24 at 4:29 PM.

## 2024-02-09 ENCOUNTER — DOCUMENTATION (OUTPATIENT)
Dept: ENDOCRINOLOGY | Facility: CLINIC | Age: 34
End: 2024-02-09
Payer: COMMERCIAL

## 2024-02-09 NOTE — ANESTHESIA POSTPROCEDURE EVALUATION
"Patient: Nicky Herbert \"Yasmeen\"    Procedure Summary       Date: 02/08/24 [facility]    Anesthesia Start: 0858 Anesthesia Stop: 0955    Procedure: EGG RETRIEVAL Diagnosis: Encounter for assisted reproductive fertility cycle    Scheduled Providers: Sd Reveles MD; Glenny Cao MD; CHEYANNE Gordon-CRNA Responsible Provider: Glenny Cao MD    Anesthesia Type: general ASA Status: 3            Anesthesia Type: general    Vitals Value Taken Time   /76 02/08/24 1025   Temp 36.6 °C (97.9 °F) 02/08/24 0950   Pulse 73 02/08/24 1025   Resp 18 02/08/24 1025   SpO2 94 % 02/08/24 1025       Anesthesia Post Evaluation    Patient location during evaluation: PACU  Patient participation: complete - patient participated  Level of consciousness: awake and alert  Pain management: adequate  Airway patency: patent  Cardiovascular status: acceptable  Respiratory status: acceptable  Hydration status: acceptable  Postoperative Nausea and Vomiting: none        No notable events documented.    "

## 2024-02-09 NOTE — PROGRESS NOTES
MD Shannan Rehman RN  Yes cans star Ocps after retrieval and schedule hysteroscopy with possible polypectomy under anesthesia        ----- Message -----  From: Shannan Guerrero RN  Sent: 2/5/2024   1:25 PM EST  To: Sofía Eisenberg MD  Subject: polyp seen on US                                Hi Dr. Eisenberg, I wanted to send you a note - this patient had a follicle scan today for her IVF cycle and was seen to have a polyp. This is the first time she has been told this, and its been mentioned. Assuming she would need it addressed before her transfer, I was wondering if you could take a look when you are DOD tomorrow to reivew her images. She was wondering how much it would delay a transfer, or if she could go on OCPs for the polypectomy and have a little more control over when to start a transfer cycle

## 2024-02-13 ENCOUNTER — TELEPHONE (OUTPATIENT)
Dept: ENDOCRINOLOGY | Facility: CLINIC | Age: 34
End: 2024-02-13
Payer: COMMERCIAL

## 2024-02-13 DIAGNOSIS — Z01.812 ENCOUNTER FOR PREPROCEDURAL LABORATORY EXAMINATION: ICD-10-CM

## 2024-02-13 DIAGNOSIS — Z31.41 FERTILITY TESTING: ICD-10-CM

## 2024-02-13 DIAGNOSIS — N84.0 ENDOMETRIAL POLYP: Primary | ICD-10-CM

## 2024-02-13 NOTE — ADDENDUM NOTE
Encounter addended by: Seymour Still MT on: 2/13/2024 8:28 AM   Actions taken: Charge Capture section accepted

## 2024-02-13 NOTE — TELEPHONE ENCOUNTER
Phone call to patient. Tentatively scheduled for IVF Anesthesia Proc: Diagnostic Hysteroscopy with possible polypectomy in IVF procedure area on 2/21/24 or 2/23/24. Patient given instructions; NPO at [facility] the night before, arrive 1 hour prior to procedure time at [place] [address] with ride to remain in building during entirety of procedure, no oral diabetic medication the night before or day of procedure, may take necessary medications with small sip of water. Will reach back out to pt on 2/19/24 for scheduling. She verbalizes understanding and is agreeable.   Regine Stanley RN 02/13/24 9:44 AM

## 2024-02-14 ENCOUNTER — TELEPHONE (OUTPATIENT)
Dept: ENDOCRINOLOGY | Facility: CLINIC | Age: 34
End: 2024-02-14
Payer: COMMERCIAL

## 2024-02-14 NOTE — TELEPHONE ENCOUNTER
Left message for patient regarding her request to schedule her procedure on either 2/21 or 2/23. RN stated that we do need to be able to schedule procedures for other patients on those days and will need to know her preferred day to free up the other day for additional add-on procedures. Requested call back as soon as possible.     Yeni Hatch RN 02/14/24 1:18 PM

## 2024-02-14 NOTE — TELEPHONE ENCOUNTER
Spoke with patient about needing to choose either 2/21/24 or 2/23/24 for her procedure date. Patient states she is available both days and is agreeable to 2/23/24 as her tentative schedule. Understands she will contacted on 2/21/24 with her procedure time and instruction. Patient states understanding and is agreeable.     Yein Hatch RN 02/14/24 1:43 PM      [As Noted in HPI] : as noted in HPI [Negative] : Heme/Lymph [Dizziness] : no dizziness [Lightheadedness] : no lightheadedness

## 2024-02-21 ENCOUNTER — TELEPHONE (OUTPATIENT)
Dept: ENDOCRINOLOGY | Facility: CLINIC | Age: 34
End: 2024-02-21
Payer: COMMERCIAL

## 2024-02-21 ENCOUNTER — PREP FOR PROCEDURE (OUTPATIENT)
Dept: ENDOCRINOLOGY | Facility: CLINIC | Age: 34
End: 2024-02-21
Payer: COMMERCIAL

## 2024-02-21 RX ORDER — MORPHINE SULFATE 2 MG/ML
2 INJECTION, SOLUTION INTRAMUSCULAR; INTRAVENOUS AS NEEDED
Status: CANCELLED | OUTPATIENT
Start: 2024-02-21

## 2024-02-21 RX ORDER — ONDANSETRON HYDROCHLORIDE 2 MG/ML
4 INJECTION, SOLUTION INTRAVENOUS AS NEEDED
Status: CANCELLED | OUTPATIENT
Start: 2024-02-21

## 2024-02-21 RX ORDER — ACETAMINOPHEN 325 MG/1
650 TABLET ORAL ONCE AS NEEDED
Status: CANCELLED | OUTPATIENT
Start: 2024-02-21

## 2024-02-21 RX ORDER — KETOROLAC TROMETHAMINE 30 MG/ML
30 INJECTION, SOLUTION INTRAMUSCULAR; INTRAVENOUS ONCE AS NEEDED
Status: CANCELLED | OUTPATIENT
Start: 2024-02-21 | End: 2024-02-26

## 2024-02-21 RX ORDER — HYDROCODONE BITARTRATE AND ACETAMINOPHEN 5; 325 MG/1; MG/1
1 TABLET ORAL ONCE AS NEEDED
Status: CANCELLED | OUTPATIENT
Start: 2024-02-21

## 2024-02-21 RX ORDER — OXYCODONE AND ACETAMINOPHEN 5; 325 MG/1; MG/1
1 TABLET ORAL EVERY 6 HOURS PRN
Status: CANCELLED | OUTPATIENT
Start: 2024-02-21

## 2024-02-21 RX ORDER — KETOROLAC TROMETHAMINE 30 MG/ML
30 INJECTION, SOLUTION INTRAMUSCULAR; INTRAVENOUS ONCE
Status: CANCELLED | OUTPATIENT
Start: 2024-02-21 | End: 2024-02-21

## 2024-02-21 NOTE — TELEPHONE ENCOUNTER
Phone call to patient. Confirmed for IVF Anesthesia Proc: Polypectomy in IVF procedure area on 2/23/24 @ 0930. Patient given instructions; NPO at [facility] the night before, arrive 1 hour prior to procedure time at [place] [address] with ride to remain in building during entirety of procedure, no oral diabetic medication the night before or day of procedure, may take necessary medications with small sip of water. She verbalizes understanding and is agreeable.

## 2024-02-23 ENCOUNTER — ANESTHESIA EVENT (OUTPATIENT)
Dept: ENDOCRINOLOGY | Facility: CLINIC | Age: 34
End: 2024-02-23
Payer: COMMERCIAL

## 2024-02-23 ENCOUNTER — ANESTHESIA (OUTPATIENT)
Dept: ENDOCRINOLOGY | Facility: CLINIC | Age: 34
End: 2024-02-23
Payer: COMMERCIAL

## 2024-02-23 ENCOUNTER — HOSPITAL ENCOUNTER (OUTPATIENT)
Dept: ENDOCRINOLOGY | Facility: CLINIC | Age: 34
Discharge: HOME | End: 2024-02-23
Payer: COMMERCIAL

## 2024-02-23 VITALS
HEART RATE: 91 BPM | DIASTOLIC BLOOD PRESSURE: 66 MMHG | RESPIRATION RATE: 16 BRPM | WEIGHT: 293 LBS | OXYGEN SATURATION: 96 % | TEMPERATURE: 98.2 F | HEIGHT: 70 IN | BODY MASS INDEX: 41.95 KG/M2 | SYSTOLIC BLOOD PRESSURE: 121 MMHG

## 2024-02-23 DIAGNOSIS — N84.0 ENDOMETRIAL POLYP: ICD-10-CM

## 2024-02-23 DIAGNOSIS — Z31.83 ENCOUNTER FOR ASSISTED REPRODUCTIVE FERTILITY CYCLE: ICD-10-CM

## 2024-02-23 DIAGNOSIS — N97.9 FEMALE INFERTILITY: ICD-10-CM

## 2024-02-23 PROBLEM — Z86.69 HISTORY OF MIGRAINE HEADACHES: Status: ACTIVE | Noted: 2024-02-23

## 2024-02-23 LAB — PREGNANCY TEST URINE, POC: NEGATIVE

## 2024-02-23 PROCEDURE — 88305 TISSUE EXAM BY PATHOLOGIST: CPT

## 2024-02-23 PROCEDURE — 7100000010 HC PHASE TWO TIME - EACH INCREMENTAL 1 MINUTE: Performed by: OBSTETRICS & GYNECOLOGY

## 2024-02-23 PROCEDURE — 3700000001 HC GENERAL ANESTHESIA TIME - INITIAL BASE CHARGE: Performed by: OBSTETRICS & GYNECOLOGY

## 2024-02-23 PROCEDURE — 7100000009 HC PHASE TWO TIME - INITIAL BASE CHARGE: Performed by: OBSTETRICS & GYNECOLOGY

## 2024-02-23 PROCEDURE — 81025 URINE PREGNANCY TEST: CPT | Performed by: STUDENT IN AN ORGANIZED HEALTH CARE EDUCATION/TRAINING PROGRAM

## 2024-02-23 PROCEDURE — 88305 TISSUE EXAM BY PATHOLOGIST: CPT | Mod: TC,SUR,AHULAB | Performed by: STUDENT IN AN ORGANIZED HEALTH CARE EDUCATION/TRAINING PROGRAM

## 2024-02-23 PROCEDURE — 88305 TISSUE EXAM BY PATHOLOGIST: CPT | Performed by: PATHOLOGY

## 2024-02-23 PROCEDURE — 3700000002 HC GENERAL ANESTHESIA TIME - EACH INCREMENTAL 1 MINUTE: Performed by: OBSTETRICS & GYNECOLOGY

## 2024-02-23 PROCEDURE — 2500000004 HC RX 250 GENERAL PHARMACY W/ HCPCS (ALT 636 FOR OP/ED): Performed by: ANESTHESIOLOGIST ASSISTANT

## 2024-02-23 PROCEDURE — 2500000004 HC RX 250 GENERAL PHARMACY W/ HCPCS (ALT 636 FOR OP/ED): Performed by: STUDENT IN AN ORGANIZED HEALTH CARE EDUCATION/TRAINING PROGRAM

## 2024-02-23 PROCEDURE — 58558 HYSTEROSCOPY BIOPSY: CPT | Performed by: OBSTETRICS & GYNECOLOGY

## 2024-02-23 RX ORDER — ESTRADIOL 2 MG/1
6 TABLET ORAL DAILY
Qty: 90 TABLET | Refills: 2 | Status: SHIPPED | OUTPATIENT
Start: 2024-02-23 | End: 2025-02-22

## 2024-02-23 RX ORDER — KETOROLAC TROMETHAMINE 30 MG/ML
30 INJECTION, SOLUTION INTRAMUSCULAR; INTRAVENOUS ONCE AS NEEDED
Status: DISCONTINUED | OUTPATIENT
Start: 2024-02-23 | End: 2024-02-24 | Stop reason: HOSPADM

## 2024-02-23 RX ORDER — ESTRADIOL 2 MG/1
2 TABLET ORAL 2 TIMES DAILY
Qty: 60 TABLET | Refills: 0
Start: 2024-02-23 | End: 2025-02-22

## 2024-02-23 RX ORDER — CEFAZOLIN SODIUM 2 G/100ML
2 INJECTION, SOLUTION INTRAVENOUS ONCE
Status: DISCONTINUED | OUTPATIENT
Start: 2024-02-23 | End: 2024-02-24 | Stop reason: HOSPADM

## 2024-02-23 RX ORDER — PROPOFOL 10 MG/ML
INJECTION, EMULSION INTRAVENOUS CONTINUOUS PRN
Status: DISCONTINUED | OUTPATIENT
Start: 2024-02-23 | End: 2024-02-23

## 2024-02-23 RX ORDER — ONDANSETRON HYDROCHLORIDE 2 MG/ML
4 INJECTION, SOLUTION INTRAVENOUS AS NEEDED
Status: DISCONTINUED | OUTPATIENT
Start: 2024-02-23 | End: 2024-02-24 | Stop reason: HOSPADM

## 2024-02-23 RX ORDER — OXYCODONE AND ACETAMINOPHEN 5; 325 MG/1; MG/1
1 TABLET ORAL EVERY 6 HOURS PRN
Status: DISCONTINUED | OUTPATIENT
Start: 2024-02-23 | End: 2024-02-24 | Stop reason: HOSPADM

## 2024-02-23 RX ORDER — KETOROLAC TROMETHAMINE 30 MG/ML
30 INJECTION, SOLUTION INTRAMUSCULAR; INTRAVENOUS ONCE
Status: DISCONTINUED | OUTPATIENT
Start: 2024-02-23 | End: 2024-02-24 | Stop reason: HOSPADM

## 2024-02-23 RX ORDER — PROPYLENE GLYCOL/PEG 400 0.3 %-0.4%
DROPS OPHTHALMIC (EYE)
Qty: 30 EACH | Refills: 3 | Status: SHIPPED | OUTPATIENT
Start: 2024-02-23 | End: 2024-05-23

## 2024-02-23 RX ORDER — ACETAMINOPHEN 325 MG/1
650 TABLET ORAL ONCE AS NEEDED
Status: DISCONTINUED | OUTPATIENT
Start: 2024-02-23 | End: 2024-02-24 | Stop reason: HOSPADM

## 2024-02-23 RX ORDER — MORPHINE SULFATE 2 MG/ML
2 INJECTION, SOLUTION INTRAMUSCULAR; INTRAVENOUS AS NEEDED
Status: DISCONTINUED | OUTPATIENT
Start: 2024-02-23 | End: 2024-02-24 | Stop reason: HOSPADM

## 2024-02-23 RX ORDER — LIDOCAINE AND PRILOCAINE 25; 25 MG/G; MG/G
CREAM TOPICAL DAILY
Qty: 30 G | Refills: 2 | Status: SHIPPED | OUTPATIENT
Start: 2024-02-23 | End: 2025-02-22

## 2024-02-23 RX ORDER — ONDANSETRON HYDROCHLORIDE 2 MG/ML
INJECTION, SOLUTION INTRAVENOUS AS NEEDED
Status: DISCONTINUED | OUTPATIENT
Start: 2024-02-23 | End: 2024-02-23

## 2024-02-23 RX ORDER — DEXAMETHASONE SODIUM PHOSPHATE 4 MG/ML
INJECTION, SOLUTION INTRA-ARTICULAR; INTRALESIONAL; INTRAMUSCULAR; INTRAVENOUS; SOFT TISSUE AS NEEDED
Status: DISCONTINUED | OUTPATIENT
Start: 2024-02-23 | End: 2024-02-23

## 2024-02-23 RX ORDER — MIDAZOLAM HYDROCHLORIDE 1 MG/ML
INJECTION, SOLUTION INTRAMUSCULAR; INTRAVENOUS AS NEEDED
Status: DISCONTINUED | OUTPATIENT
Start: 2024-02-23 | End: 2024-02-23

## 2024-02-23 RX ORDER — SODIUM CHLORIDE, SODIUM LACTATE, POTASSIUM CHLORIDE, CALCIUM CHLORIDE 600; 310; 30; 20 MG/100ML; MG/100ML; MG/100ML; MG/100ML
INJECTION, SOLUTION INTRAVENOUS CONTINUOUS PRN
Status: DISCONTINUED | OUTPATIENT
Start: 2024-02-23 | End: 2024-02-23

## 2024-02-23 RX ORDER — HYDROCODONE BITARTRATE AND ACETAMINOPHEN 5; 325 MG/1; MG/1
1 TABLET ORAL ONCE AS NEEDED
Status: DISCONTINUED | OUTPATIENT
Start: 2024-02-23 | End: 2024-02-24 | Stop reason: HOSPADM

## 2024-02-23 RX ORDER — PROGESTERONE 50 MG/ML
75 INJECTION, SOLUTION INTRAMUSCULAR DAILY
Qty: 50 ML | Refills: 3 | Status: SHIPPED | OUTPATIENT
Start: 2024-02-23 | End: 2024-04-10

## 2024-02-23 RX ORDER — FENTANYL CITRATE 50 UG/ML
INJECTION, SOLUTION INTRAMUSCULAR; INTRAVENOUS AS NEEDED
Status: DISCONTINUED | OUTPATIENT
Start: 2024-02-23 | End: 2024-02-23

## 2024-02-23 RX ADMIN — FENTANYL CITRATE 25 MCG: 50 INJECTION, SOLUTION INTRAMUSCULAR; INTRAVENOUS at 09:56

## 2024-02-23 RX ADMIN — ONDANSETRON 4 MG: 2 INJECTION, SOLUTION INTRAMUSCULAR; INTRAVENOUS at 09:55

## 2024-02-23 RX ADMIN — MIDAZOLAM 2 MG: 1 INJECTION INTRAMUSCULAR; INTRAVENOUS at 09:47

## 2024-02-23 RX ADMIN — FENTANYL CITRATE 50 MCG: 50 INJECTION, SOLUTION INTRAMUSCULAR; INTRAVENOUS at 09:47

## 2024-02-23 RX ADMIN — FENTANYL CITRATE 25 MCG: 50 INJECTION, SOLUTION INTRAMUSCULAR; INTRAVENOUS at 10:06

## 2024-02-23 RX ADMIN — SODIUM CHLORIDE, POTASSIUM CHLORIDE, SODIUM LACTATE AND CALCIUM CHLORIDE: 600; 310; 30; 20 INJECTION, SOLUTION INTRAVENOUS at 09:35

## 2024-02-23 RX ADMIN — PROPOFOL 150 MCG/KG/MIN: 10 INJECTION, EMULSION INTRAVENOUS at 09:50

## 2024-02-23 RX ADMIN — DEXAMETHASONE SODIUM PHOSPHATE 4 MG: 4 INJECTION, SOLUTION INTRA-ARTICULAR; INTRALESIONAL; INTRAMUSCULAR; INTRAVENOUS; SOFT TISSUE at 09:55

## 2024-02-23 RX ADMIN — HYDROMORPHONE HYDROCHLORIDE 0.2 MG: 0.2 INJECTION, SOLUTION INTRAMUSCULAR; INTRAVENOUS; SUBCUTANEOUS at 10:14

## 2024-02-23 ASSESSMENT — PAIN SCALES - GENERAL
PAINLEVEL_OUTOF10: 3
PAINLEVEL_OUTOF10: 0 - NO PAIN
PAINLEVEL_OUTOF10: 7
PAINLEVEL_OUTOF10: 3
PAIN_LEVEL: 0
PAINLEVEL_OUTOF10: 0 - NO PAIN

## 2024-02-23 ASSESSMENT — PAIN - FUNCTIONAL ASSESSMENT
PAIN_FUNCTIONAL_ASSESSMENT: 0-10

## 2024-02-23 ASSESSMENT — PAIN DESCRIPTION - DESCRIPTORS
DESCRIPTORS: CRAMPING

## 2024-02-23 ASSESSMENT — COLUMBIA-SUICIDE SEVERITY RATING SCALE - C-SSRS
6. HAVE YOU EVER DONE ANYTHING, STARTED TO DO ANYTHING, OR PREPARED TO DO ANYTHING TO END YOUR LIFE?: NO
1. IN THE PAST MONTH, HAVE YOU WISHED YOU WERE DEAD OR WISHED YOU COULD GO TO SLEEP AND NOT WAKE UP?: NO
2. HAVE YOU ACTUALLY HAD ANY THOUGHTS OF KILLING YOURSELF?: NO

## 2024-02-23 NOTE — PROGRESS NOTES
"Patient ID: Nicky Herbert \"Oly" is a 33 y.o. female.    Polypectomy    Date/Time: 2/23/2024 10:03 AM    Performed by: Sd Reveles MD  Authorized by: Sofía Eisenberg MD    Consent:     Consent obtained:  Written and verbal    Consent given by:  Patient    Risks, benefits, and alternatives were discussed: yes      Risks discussed:  Bleeding, infection and pain    Alternatives discussed:  No treatment  Universal protocol:     Procedure explained and questions answered to patient or proxy's satisfaction: yes      Relevant documents present and verified: yes      Test results available: yes      Imaging studies available: yes      Immediately prior to procedure, a time out was called: yes      Patient identity confirmed:  Verbally with patient and arm band  Pre-procedure details:     Skin preparation:  Povidone-iodine    Preparation: Patient was prepped and draped in the usual sterile fashion    Sedation:     Sedation type:  Moderate sedation  Procedure specific details:      Preoperative Diagnosis: endometrial polyp  Postoperative Diagnosis: Same, cervical polyp  Assistant: none       Procedure: Hysteroscopic polypectomy    Discussed risks, benefits, alternatives and potential complications of surgical management. Written Informed Consent was obtained prior to the procedure and is detailed in the patient's record. Urine pregnancy test was performed and was negative. Time out was performed.    Anesthesia: MAC  Paracervical block with 1% lidocaine: No  Tenaculum: Yes  Cervical dilation: Yes  Estimated Blood Loss: 5 cc  Specimens: endometrial polyp, cervical polyp  IV Fluids: 500 cc   Hysteroscopic fluid deficit: minimal      Findings: endometrial polyp in lower uterine segment, cervical polyp      PROCEDURE DETAILS:   Patient was taken to the operating room.  She was prepped and draped in the usual sterile fashion in dorsal lithotomy position. A speculum was placed within the vaginal canal and the cervix " was visualized. Betadine swabs x 3 were used to cleanse the cervix. A single tooth tenaculum was placed on the anterior lip of the cervix. The cervix was serially dilated.  The Aveta hysteroscope was then inserted into the uterine cavity under direct visualization using normal saline as a distension medium. Once entry into the uterine cavity was completed, the above findings were visualized. The resection device was used to the resect the tissue until the uterus appeared smooth with no residual pathology.   Additional procedure details:     Excellent hemostasis was then noted. The hysteroscope was then removed from the uterine cavity without complications. Tenaculum and speculum were removed. Patient tolerated the procedure well and was returned to the recovery room in stable condition.  All counts were correct x 2. The attending physician was present for the entire procedure.    Attending Attestation: I was physically present for key and critical portions performed by the fellow. I reviewed the fellow's documentation and discussed the patient with the fellow. I agree with the fellow's medical decision making as documented in the fellow's note.         Post-procedure details:     Procedure completion:  Tolerated well, no immediate complications

## 2024-02-23 NOTE — ADDENDUM NOTE
Addendum  created 02/23/24 1051 by Mark Hughes, [facility]    Narcotic reconciliation edited

## 2024-02-23 NOTE — ANESTHESIA PREPROCEDURE EVALUATION
"Patient: Nicky Herbert \"Yasmeen\"    Procedure Information       Date/Time: 02/23/24 0930    Scheduled providers: Sd Reveles MD; Yousif Butler MD; ENOCH Oliver    Procedure: POLYPECTOMY    [facility]            Relevant Problems   Anesthesia (within normal limits)      Cardiovascular   (+) Paroxysmal supraventricular tachycardia      Endocrine   (+) Hypothyroid   (+) Obesity      GI   (+) Gastric ulcer   (+) Hiatal hernia      Neuro/Psych   (+) Anxiety   (+) History of migraine headaches      Pulmonary   (+) VIK (obstructive sleep apnea) (Noncompliant w CPAP)      Hematology   (+) Anemia      Musculoskeletal   (+) Osteoarthritis of knee      Infectious Disease   (+) Genital herpes       Clinical information reviewed:    Allergies                NPO Detail:  No data recorded     Physical Exam    Airway  Mallampati: II     Cardiovascular    Dental    Pulmonary    Abdominal            Anesthesia Plan    History of general anesthesia?: yes  History of complications of general anesthesia?: no    ASA 3     MAC     intravenous induction   Anesthetic plan and risks discussed with patient.      "

## 2024-02-23 NOTE — ANESTHESIA POSTPROCEDURE EVALUATION
"Patient: Nicky Herbert \"aYsmeen\"    Procedure Summary       Date: 02/23/24 Room / Location:  [facility]    Anesthesia Start: 0935 Anesthesia Stop: 1011    Procedure: POLYPECTOMY Diagnosis: Endometrial polyp    Scheduled Providers: Sd Reveles MD; Yousif Butler MD; ENOCH Oliver Responsible Provider: Yousif Butler MD    Anesthesia Type: MAC ASA Status: 3            Anesthesia Type: MAC    Vitals Value Taken Time   /70 02/23/24 1038   Temp 36.8 °C (98.2 °F) 02/23/24 1008   Pulse 91 02/23/24 1038   Resp 14 02/23/24 1038   SpO2 97 % 02/23/24 1038       Anesthesia Post Evaluation    Patient location during evaluation: bedside  Patient participation: complete - patient cannot participate  Level of consciousness: awake  Pain score: 0  Pain management: adequate  Airway patency: patent  Cardiovascular status: acceptable  Respiratory status: acceptable  Hydration status: acceptable  Postoperative Nausea and Vomiting: none        No notable events documented.    "

## 2024-02-23 NOTE — DISCHARGE INSTRUCTIONS
[facility]  [address]. [place]  [address]  Tel: [phone]   Fax: [phone]    Home Going Instructions after Polypectomy:    Activity:   We do not recommend any exercise on the day of your polypectomy.  You may return to work the following day.  You may have light bleeding or spotting for several days after polypectomy.   Use only sanitary pads for bleeding, do not use tampons until you have no further bleeding.   Please abstain from intercourse until you have no further bleeding.     Anesthesia:  Drink small amounts of liquids initially and then slowly increase your intake of food. Drinking fluids will keep your bowels regular.   Avoid foods that are sweet, spicy or hard to digest today.  If you feel nauseated, rest your stomach for one hour, and then try drinking clear liquids again.  You may take a stool softener or miralax/milk of magnesia to help with constipation that may occur after anesthesia.  Please make sure a responsible adult is with you for at least 24 hours after surgery and do not drive or make important decisions during this time. Anesthesia may affect your judgment, coordination, and reaction time.    Pain:  If you experience any post-op pain, pain can be managed by taking Ibuprofen and/or Tylenol.    Follow up:  Follow up with your primary nurse a few days after your procedure to check in and make sure you know what your next steps are.   A post-operative visit with your physician is not necessary after polypectomy.    When to call your provider:  Vaginal bleeding that is more than a normal period that doesn't taper down.  Bleeding through 1 sanitary pad an hour.  Any clots the size of an egg or bigger.  Fever of 100.4 or higher with chills.  Unusual or foul smelling discharge.  Worsening abdominal pain.    Nilsa Buchanan    8:46 AM

## 2024-02-24 DIAGNOSIS — N97.9 FEMALE INFERTILITY: ICD-10-CM

## 2024-02-24 NOTE — PROGRESS NOTES
Boarding Pass Interval FET Checklist    Age: 33 y.o.    Provider: Delmy Eisenberg MD  IVF Fellow  Primary RN: Liz Reynoso  Reasons for Treatment: Polycystic Ovarian Syndrome  Last BMI  24 : 42.83 kg/m²       Past Medical History:   Diagnosis Date    Acid reflux     patient reports it is mild    Cutaneous abscess of back (any part, except buttock) 2015    Abscess of back    Personal history of other diseases of the nervous system and sense organs 2016    History of acute otitis media    Polycystic ovary syndrome 2013       Date Done Consultation Results/Comments   2023 Medication Protocol Lead in: estrace  Stimulation protocol: programmed FET 6mg estrace/75 mg KHRIS  Trigger plan:  n/a  Pre-retrieval meds: Antibiotics per protocol  Adjuncts:  ASA 81 mg  Notes: n/a    Update  bhcg negative after first FET, pt to do another programmed no change to protocol      24   FET Treatment Plan Packet- ID REQ (Every cycle) Received and in chart: Yes (Liz Reynoso RN)      24 IVF Information and Authorization - [employer]/offsite Storage (ID REQ) (Yearly) Received and in chart: Yes (Liz Reynoso RN)   24 [employer] Embryo- Couple or Single Packet (Yearly) Received and in chart: Yes (Liz Reynoso RN)   24  Waiver (In) Form done   24 Embryo Ship In Done    24 Procedure Order Placed [x]   N/a MFM Consult Okay to proceed? N/A   N/a Psych Consult Okay to proceed? N/A   N/a Genetics Consult Okay to proceed? N/A    Other    Date Done Female Labs Results/Comments   2023 T&S (Q 1 Year) Results for orders placed or performed in visit on 23 (from the past 8760 hour(s))   Type And Screen   Result Value Ref Range    ABO GROUP (TYPE) IN BLOOD O     Rh POS     ANTIBODY SCREEN NEG       2023 Hep B sAg NONREACTIVE   2023 Hep C AB NONREACTIVE   2023 HIV NONREACTIVE   2023 Syphilis NONREACTIVE   2023 GC/CT GC: NEGATIVE  CT:  NEGATIVE   2023 Rubella (Q 5 Years) Positive   2023 Varicella (Q 5 Years) Positive   23 TSH 1.823   2023 HgbA1C 5.5 (Ref range: see below %)   24 Uterine Cavity Eval HS2023 in Care Everywhere  Status of fallopian tubes: 2017, reported open -    SIS: none    Hyster: (10/26/2023)    Procedure: Diagnostic Hysteroscopy   Preop diagnosis: Fertility testing  Post op diagnosis: Same   Assistant: None     Findings:   Cavity: Smooth cavity no lesions noted  Ostia: Bilateral tubal ostia visualized    Polyp noted during IVF stim     Polypectomy 24 Pap Smear (Q 5 years)   Negative for intraepithelial lesion or malignancy      HPV: NEGATIVE    N/A Mammogram ( > 40) N/a              Date Done Miscellaneous Results/Comments   23 BMI Checklist  BMI > 40 or < 18 Added to chart:   Yes; Boarding Pass BMI Checklist (BMI > 40 or < 18)    Date Done Testing Results   2023 CBC Plt: 417 (Ref range: 150 - 450 x10*3/uL)  Hct: 38.5 (Ref range: 36.0 - 46.0 %)   2023 CMP BUN: 12 (Ref range: 6 - 23 mg/dL)  Cre: 0.59 (Ref range: 0.50 - 1.05 mg/dL)  AST: 15 (Ref range: 9 - 39 U/L)  ALT: 15 (Ref range: 7 - 45 U/L)   2023 HgbA1C 5.5 (Ref range: see below %)   10/18/2023 MFM clearance required:    BMI > 40 with co-morbidities    BMI > 45- all patients Clearance Letter-Provider Reviewed: khadijah   N/a Additional Labs (BMI < 18) N/a   23 Weight Loss / Nutrition Consult (must be offered for BMI > 40) Declined - provider documentation      N/a >= 45 Checklist  Added to chart:   No   **Does not need to be completed prior to placing on IVF calendar**    MD Completion:  Ectopic Risk: No  no per ivf consult note   Medically Complex: No no per ivf consult note     Reviewed and approved by ERIC BEJARANO on 24 at 3:48 PM.      Addendum created - patient to do another programmed FET after negative HCG with first FET. No change in protocol.   Reviewed and approved by HEYDI,  LETICIA WHEATLEY on 4/8/24 at 2:25 PM.

## 2024-02-26 ENCOUNTER — TELEPHONE (OUTPATIENT)
Dept: ENDOCRINOLOGY | Facility: CLINIC | Age: 34
End: 2024-02-26
Payer: COMMERCIAL

## 2024-02-26 NOTE — TELEPHONE ENCOUNTER
Returned patient's call. Patient did IVF stim and retrieval and polyp noted during IVF stim. Patient had polypectomy after and was placed OCPs with start of menses to facilitate transfer cycle-plan okay'd by Dr. Eisenberg. Patient started OCPs on 2/13 and will stop them today. Orders were already placed for transfer. Patient's partner needs to sign fet treatment plan. Boarding pass needs to be signed off. Patient will call with period to set up transfer. Patient will start estrace with menses.   KEYLA TRIMBLE on 2/26/24 at 11:53 AM.

## 2024-02-28 ENCOUNTER — DOCUMENTATION (OUTPATIENT)
Dept: ENDOCRINOLOGY | Facility: CLINIC | Age: 34
End: 2024-02-28
Payer: COMMERCIAL

## 2024-02-28 PROCEDURE — RXMED WILLOW AMBULATORY MEDICATION CHARGE

## 2024-02-28 NOTE — PROGRESS NOTES
Patient started full flow period today. Patient started 6mg estrace for programmed FET. [facility] Patient's partner needs to sign FET treatment plan. Patient will plan on lining check on March 13th for a transfer on March 20th with KHRIS starting on 3/15/24. Will call patient if these dates change. Patient agreeable to plan.   KEYLA TRIMBLE on 2/28/24 at 2:56 PM.

## 2024-02-29 LAB
LABORATORY COMMENT REPORT: NORMAL
PATH REPORT.FINAL DX SPEC: NORMAL
PATH REPORT.GROSS SPEC: NORMAL
PATH REPORT.RELEVANT HX SPEC: NORMAL
PATH REPORT.TOTAL CANCER: NORMAL

## 2024-02-29 NOTE — PROGRESS NOTES
Patient called with menses for FET setup.   LMP: 2/28/24  Plan: programmed FET 6mgestrace/75mg KHRIS, baby ASA 81mg to start with transfer   Tentative lining check: 3/13/24  Tentative progesterone start: 3/15/24  Tentative transfer date: 3/20/24  Number of days of progesterone for transfer: 6  Treatment plan: done   Embryo # confirmed: 8  Embryo # to transfer: 1     D/w Dr. Reveles  Reviewed and approved by LETICIA SOLIZ on 2/29/24 at 12:42 PM.     Patient aware of plan and agreeable.   KEYLA TRIMBLE on 2/29/24 at 3:00 PM.

## 2024-03-08 ENCOUNTER — PHARMACY VISIT (OUTPATIENT)
Dept: PHARMACY | Facility: CLINIC | Age: 34
End: 2024-03-08
Payer: COMMERCIAL

## 2024-03-08 ENCOUNTER — SPECIALTY PHARMACY (OUTPATIENT)
Dept: PHARMACY | Facility: CLINIC | Age: 34
End: 2024-03-08

## 2024-03-11 ENCOUNTER — PHARMACY VISIT (OUTPATIENT)
Dept: PHARMACY | Facility: CLINIC | Age: 34
End: 2024-03-11
Payer: MEDICAID

## 2024-03-13 ENCOUNTER — ANCILLARY PROCEDURE (OUTPATIENT)
Dept: ENDOCRINOLOGY | Facility: CLINIC | Age: 34
End: 2024-03-13
Payer: COMMERCIAL

## 2024-03-13 DIAGNOSIS — N97.9 FEMALE INFERTILITY: ICD-10-CM

## 2024-03-13 LAB
ESTRADIOL SERPL-MCNC: 222 PG/ML
PROGEST SERPL-MCNC: 0.4 NG/ML

## 2024-03-13 PROCEDURE — 84144 ASSAY OF PROGESTERONE: CPT

## 2024-03-13 PROCEDURE — 76857 US EXAM PELVIC LIMITED: CPT | Performed by: OBSTETRICS & GYNECOLOGY

## 2024-03-13 PROCEDURE — 76857 US EXAM PELVIC LIMITED: CPT

## 2024-03-13 PROCEDURE — 82670 ASSAY OF TOTAL ESTRADIOL: CPT

## 2024-03-13 PROCEDURE — 36415 COLL VENOUS BLD VENIPUNCTURE: CPT

## 2024-03-13 NOTE — PROGRESS NOTES
Lining Check    Patient reviewed at team meeting to set up frozen embryo transfer.  Medication protocol in [facility] cycles tab  Lining check:  3/13  KHRIS start: 3/15 if confirmed   Tentative FET date:  3/20/24  Plan to be communicated to patient by treatment team.   KHRIS teaching done, sites marked    Liz Reynoso  03/13/2024  7:57 AM    [facility]  [facility]ound and/or labs reviewed at [facility].   Results for orders placed or performed in visit on 03/13/24 (from the past 96 hour(s))   Progesterone   Result Value Ref Range    Progesterone 0.4 ng/mL   Estradiol   Result Value Ref Range    Estradiol 222 pg/mL     Fri 3/15 (Day 17)   progesterone injection: Inject 75 mg once daily into the muscle   estradiol tablet: Take 6 mg once daily by mouth    Sat 3/16 (Day 18)   progesterone injection: Inject 75 mg once daily into the muscle   estradiol tablet: Take 6 mg once daily by mouth    Sun 3/17 (Day 19)   progesterone injection: Inject 75 mg once daily into the muscle   estradiol tablet: Take 6 mg once daily by mouth    Mon 3/18 (Day 20)   progesterone injection: Inject 75 mg once daily into the muscle   estradiol tablet: Take 6 mg once daily by mouth    Tue 3/19 (Day 21)   progesterone injection: Inject 75 mg once daily into the muscle   estradiol tablet: Take 6 mg once daily by mouth    Wed 3/20 (Day 22)   progesterone injection: Inject 75 mg once daily into the muscle   estradiol tablet: Take 6 mg once daily by mouth    RTC in  3/20/24  for  Embryo transfer  and Progesterone.   Sofía Eisenberg  03/13/2024  12:38 PM    Called patient with plan. Patient will expect a phone call prior to her transfer. Patient will start KHRIS as scheduled above.   LIZ REYNOSO on 3/13/24 at 3:30 PM.

## 2024-03-20 ENCOUNTER — HOSPITAL ENCOUNTER (OUTPATIENT)
Dept: ENDOCRINOLOGY | Facility: CLINIC | Age: 34
Discharge: HOME | End: 2024-03-20
Payer: COMMERCIAL

## 2024-03-20 DIAGNOSIS — N97.9 FEMALE INFERTILITY: ICD-10-CM

## 2024-03-20 DIAGNOSIS — Z31.83 ENCOUNTER FOR ASSISTED REPRODUCTIVE FERTILITY CYCLE: ICD-10-CM

## 2024-03-20 DIAGNOSIS — Z32.00 UNCONFIRMED PREGNANCY: Primary | ICD-10-CM

## 2024-03-20 LAB — PROGEST SERPL-MCNC: 19.5 NG/ML

## 2024-03-20 PROCEDURE — 58974 EMBRYO TRANSFER INTRAUTERINE: CPT | Performed by: OBSTETRICS & GYNECOLOGY

## 2024-03-20 PROCEDURE — 36415 COLL VENOUS BLD VENIPUNCTURE: CPT | Performed by: OBSTETRICS & GYNECOLOGY

## 2024-03-20 PROCEDURE — 89253 EMBRYO HATCHING: CPT | Performed by: OBSTETRICS & GYNECOLOGY

## 2024-03-20 PROCEDURE — 89352 THAWING CRYOPRESRVED EMBRYO: CPT | Performed by: OBSTETRICS & GYNECOLOGY

## 2024-03-20 PROCEDURE — 84144 ASSAY OF PROGESTERONE: CPT | Performed by: OBSTETRICS & GYNECOLOGY

## 2024-03-20 PROCEDURE — 89255 PREPARE EMBRYO FOR TRANSFER: CPT | Performed by: OBSTETRICS & GYNECOLOGY

## 2024-03-20 PROCEDURE — 76998 US GUIDE INTRAOP: CPT | Performed by: OBSTETRICS & GYNECOLOGY

## 2024-03-20 NOTE — PROGRESS NOTES
"Patient ID: Nicky Herbert \"Yasmeen\" is a 33 y.o. female.    Embryo Transfer    Date/Time: 3/20/2024 12:37 PM    Performed by: Sofía Bejarano MD  Authorized by: CHEYANNE Santizo-CNP    Consent:     Consent obtained:  Written    Consent given by:  Patient    Procedure risks and benefits discussed: yes      Patient questions answered: yes      Patient agrees, verbalizes understanding, and wants to proceed: yes      Educational handouts given: yes      Instructions and paperwork completed: yes    Procedure:     Pelvic exam performed: No      Cervix cleaned and prepped: Yes      Speculum placed in vagina: Yes      Ultrasound guidance: Yes      Catheter type:  Sure View Kerr    Difficulty: easy      Estimated blood loss:  None    Embryos transferred:  1    Catheter navigation notes:  Easy afterload  AV curve  Post-procedure:     Patient tolerated procedure well: yes    Comments:      Preop diagnosis: Infertility  Post op diagnosis: Same  Assistant: None  Depth: - cm  Curve: Av  Distance from fundus: 15 mm  Embryo stage at day of transfer: BLAST  Embryo notes: none   Additional Notes:  Anesthesia: None    IV Fluids: None  UOP: Not recorded  Specimens: None  Complications: None  This replaces an operative report.    SOFÍA BEJARANO on 3/20/24 at 12:38 PM.          "

## 2024-03-20 NOTE — DISCHARGE INSTRUCTIONS
[facility]  [address]. [place]  [address]  Tel: [phone]   Fax: [phone]    Home Going Instructions after Embryo Transfer:     After completing your embryo transfer please treat your body as though you are pregnant.  No smoking, alcohol, or recreational drugs.  Make sure you are taking a prenatal vitamin daily.   Continue all medications currently prescribed for embryo transfer until otherwise instructed by your physician, even if you experience spotting or bleeding and even if you have a negative home pregnancy test.   Medications to avoid in pregnancy: Advil, Motrin, Ibuprofen, Aleve, Excedrin, [place], Sudafed, and Pepto-Bismol. Avoid aspirin unless you are taking this daily at the direction of your physician.   Medications that are generally safe in pregnancy: Tylenol, Benadryl, Plain Robitussin, Zyrtec, Claritin, Pepcid, Tums, Rolaids, Mylanta, Nasonex.   Foods to avoid:   Seafood that is high in mercury; you can have canned tuna twice a week.   Deli meats, deli salads, hot dogs, and unpasteurized cheeses due to the risk of Listeria.  Activities to avoid:   No hot tubs, whirlpools, or saunas.  Avoid starting new exercise routines that you have not done previously.  Avoid lifting > 30 lbs.  No cleaning litter boxes.  No intercourse until you test for pregnancy.   You do not need to be on bed rest following the transfer. It is healthy, normal, and recommended to go about routine physical activity.   We advise against taking a home pregnancy test due to the possibility of false negative and false positive results.   You will test for pregnancy in approximately 10 days, at which point you will be about 4 weeks pregnant.   If blood work was drawn on the day of your transfer, you can expect a phone that day with the results of your bloodwork. We expect a progesterone level greater than or equal to 16. If you level is less than 16  we will adjust your progesterone dose and repeat your level in 2 days.     Nilsa Buchanan    11:46 AM

## 2024-04-01 ENCOUNTER — CLINICAL SUPPORT (OUTPATIENT)
Dept: ENDOCRINOLOGY | Facility: CLINIC | Age: 34
End: 2024-04-01
Payer: COMMERCIAL

## 2024-04-01 ENCOUNTER — TELEPHONE (OUTPATIENT)
Dept: ENDOCRINOLOGY | Facility: CLINIC | Age: 34
End: 2024-04-01

## 2024-04-01 DIAGNOSIS — Z31.83 ENCOUNTER FOR ASSISTED REPRODUCTIVE FERTILITY CYCLE: ICD-10-CM

## 2024-04-01 DIAGNOSIS — N97.9 FEMALE INFERTILITY: ICD-10-CM

## 2024-04-01 DIAGNOSIS — Z32.00 UNCONFIRMED PREGNANCY: ICD-10-CM

## 2024-04-01 LAB — B-HCG SERPL-ACNC: 2 MIU/ML

## 2024-04-01 PROCEDURE — 84702 CHORIONIC GONADOTROPIN TEST: CPT

## 2024-04-01 PROCEDURE — 36415 COLL VENOUS BLD VENIPUNCTURE: CPT

## 2024-04-01 NOTE — PROGRESS NOTES
Patient here for bHCG following FET on 3/20/24.   HCG level today= negative     [facility]  Ultrasound and/or labs reviewed at [facility].     Results for orders placed or performed in visit on 04/01/24 (from the past 96 hour(s))   hCG, quantitative, pregnancy   Result Value Ref Range    HCG, Beta-Quantitative 2 <5 mIU/mL       Lab Results   Component Value Date    HCGQUANT 2 04/01/2024     Will reach out to patient to discuss next steps.     Shannon Juarez  04/01/2024  12:43 PM     Attempted to call patient with results, left voicemail to return call. Will have patient discontinue FET meds and will expect to get a call for discussion for next steps from Dr. Juarez later today.     Addendum:     Called patient and spoke with Yasmeen. She is understandably sad that she did not get pregnant, but is interested in another transfer. I asked her to stop her estrogen, progesterone, and ASA 81 mg and to call with her menses to get set up for her next cycle. Plan is for the same estradiol 6 mg daily with KHRIS 75 mg. Primary RN notified.    D/w Dr. Elgin Juarez MD PGY 5  Reproductive Endocrinology and Infertility Fellow    Sent patient [employer] message with next steps. Boarding pass addendum added. Sent engaged MD forms. Patient to do Fet treatment plan and ship in waiver. Patient will stop medications today and will call with her period to set up another transfer. Orders for transfer placed and patient has med refills. Patient to start estrace with menses and will call to set up lining check. New cycle tab started for when patient gets menses.     KEYLA TRIMBLE on 4/1/24 at 4:07 PM.

## 2024-04-01 NOTE — TELEPHONE ENCOUNTER
Called patient and reviewed options for next FET    She transferred 1 embryo last time and was not successful. She has 7 blasts remaining.    She requests transfer of 2 embryos this cycle.    We reviewed my recommendation to transfer 1 again due to higher risks of twin pregnancy and accompanying risks to pregnancy (including  delivery) with twin pregnancy.     Reviewed that as patient has already failed 1 FET I would allow her to proceed with transfer of 2 embryos if that is what she desires after above counseling, however, strong recommendation is to transfer 1 embryo.    ERIC BEJARANO on 4/10/24 at 4:01 PM.

## 2024-04-05 ENCOUNTER — TELEPHONE (OUTPATIENT)
Dept: ENDOCRINOLOGY | Facility: CLINIC | Age: 34
End: 2024-04-05
Payer: COMMERCIAL

## 2024-04-05 NOTE — TELEPHONE ENCOUNTER
Pt called - spoke to pt about plan and sent [facility] message:     ALIVIA Arana,     I did leave you a voice mail also.   Yes, please start your Estradiol tablets and call the office to schedule your Lining check, E2 and P4 for Wed, 4/17/2024;   Unfortunately, Dr Eisenberg will be out the week that you need your transfer.,   You can do it on Wed the 24th or Thursday the 25th with DR Uribe , or Friday the 26th with DR Reveles.   Please let us know which day you prefer.     I just resent two forms to your e-mail that needs to be signed as soon as possible through [employer].     I put you on the 25th with DR Bernstein.     See you on the 17th!     Patti

## 2024-04-08 NOTE — PROGRESS NOTES
Patient sent [employer] message with menses for FET setup on 4/5/24.   LMP: 4/5/24-started 6mg estrace this day   Plan: programmed FET, baby ASA 81 mg to start day of transfer   Tentative lining check: 4/17/24  Tentative progesterone start: 4/20/24  Tentative transfer date: 4/25/24  Number of days of progesterone for transfer: 6  Treatment plan: not signed, called patient and left message to sign by today  Embryo # confirmed: 7 blasts at [employer]  Embryo # to transfer: 1     KEYLA TRIMBLE on 4/8/24 at 11:51 AM.     D/w Dr. Bernstein  Reviewed and approved by LETICIA SOLIZ on 4/8/24 at 1:12 PM.

## 2024-04-10 NOTE — PROGRESS NOTES
When brought to the meeting this past Monday for FET set up, patient had not yet filled out treatment plan. Patient finished treatment plan and marked off that she would like to transfer two embryos with this next FET. Patient has not discussed if this is a possibility and would like to speak with a provider about the potential of transferring more than one embryo since she had a failed transfer.   KEYLA TRIMBLE on 4/10/24 at 11:42 AM.

## 2024-04-17 ENCOUNTER — ANCILLARY PROCEDURE (OUTPATIENT)
Dept: ENDOCRINOLOGY | Facility: CLINIC | Age: 34
End: 2024-04-17
Payer: COMMERCIAL

## 2024-04-17 DIAGNOSIS — N97.9 FEMALE INFERTILITY: ICD-10-CM

## 2024-04-17 LAB
ESTRADIOL SERPL-MCNC: 209 PG/ML
PROGEST SERPL-MCNC: 0.9 NG/ML

## 2024-04-17 PROCEDURE — 84144 ASSAY OF PROGESTERONE: CPT

## 2024-04-17 PROCEDURE — 36415 COLL VENOUS BLD VENIPUNCTURE: CPT

## 2024-04-17 PROCEDURE — 82670 ASSAY OF TOTAL ESTRADIOL: CPT

## 2024-04-17 PROCEDURE — 76857 US EXAM PELVIC LIMITED: CPT

## 2024-04-17 PROCEDURE — 76857 US EXAM PELVIC LIMITED: CPT | Performed by: OBSTETRICS & GYNECOLOGY

## 2024-04-17 RX ORDER — PROGESTERONE 50 MG/ML
75 INJECTION, SOLUTION INTRAMUSCULAR DAILY
Start: 2024-04-17 | End: 2024-04-29 | Stop reason: SDUPTHER

## 2024-04-17 NOTE — PROGRESS NOTES
RN CYCLING NOTE:  Here for lining check, day 13 of cycle.  Reviewed US with pt; likely to stay with plan as discussed at FET set up.  Pt states that she has spoken to DR Eisenberg re: # embryos to transfer, and couple has  Decided to transfer 2 embryos. RN will notify team today.     programmed FET, baby ASA 81 mg to start day of transfer   Tentative progesterone start: 4/20/24  Tentative transfer date: 4/25/24  Team will call pt today with results and plan.   Patti Skeltonaney 04/17/24 7:42 AM    [facility] PROVIDER NOTE  Ultrasound and/or labs reviewed at [facility].   Results for orders placed or performed in visit on 04/17/24 (from the past 96 hour(s))   Progesterone   Result Value Ref Range    Progesterone 0.9 ng/mL   Estradiol   Result Value Ref Range    Estradiol 209 pg/mL     Wed 4/17 (Day 13)   estradiol 2 mg tablet: Take 6 mg once daily by mouth    Thu 4/18 (Day 14)   estradiol 2 mg tablet: Take 6 mg once daily by mouth    Fri 4/19 (Day 15)   estradiol 2 mg tablet: Take 6 mg once daily by mouth    Sat 4/20 (Day 16)   progesterone injection: Inject 75 mg once daily into the muscle   estradiol 2 mg tablet: Take 6 mg once daily by mouth    Sun 4/21 (Day 17)   progesterone injection: Inject 75 mg once daily into the muscle   estradiol 2 mg tablet: Take 6 mg once daily by mouth    Mon 4/22 (Day 18)   progesterone injection: Inject 75 mg once daily into the muscle   estradiol 2 mg tablet: Take 6 mg once daily by mouth    Tue 4/23 (Day 19)   progesterone injection: Inject 75 mg once daily into the muscle   estradiol 2 mg tablet: Take 6 mg once daily by mouth    Wed 4/24 (Day 20)   progesterone injection: Inject 75 mg once daily into the muscle   estradiol 2 mg tablet: Take 6 mg once daily by mouth    Thu 4/25 (Day 21)   progesterone injection: Inject 75 mg once daily into the muscle   estradiol 2 mg tablet: Take 6 mg once daily by mouth    RTC on 4/25/24 for embryo transfer    Shannon Juarez  04/17/2024  12:39 PM    Called  patient with above plan. Patient agreeable.   KEYLA TRIMBLE on 4/17/24 at 1:12 PM.

## 2024-04-25 ENCOUNTER — TELEPHONE (OUTPATIENT)
Dept: ENDOCRINOLOGY | Facility: CLINIC | Age: 34
End: 2024-04-25

## 2024-04-25 ENCOUNTER — PREP FOR PROCEDURE (OUTPATIENT)
Dept: ENDOCRINOLOGY | Facility: CLINIC | Age: 34
End: 2024-04-25
Payer: COMMERCIAL

## 2024-04-25 ENCOUNTER — HOSPITAL ENCOUNTER (OUTPATIENT)
Dept: ENDOCRINOLOGY | Facility: CLINIC | Age: 34
Discharge: HOME | End: 2024-04-25
Payer: COMMERCIAL

## 2024-04-25 DIAGNOSIS — Z31.41 FERTILITY TESTING: Primary | ICD-10-CM

## 2024-04-25 DIAGNOSIS — N97.9 FEMALE INFERTILITY: ICD-10-CM

## 2024-04-25 DIAGNOSIS — Z32.00 UNCONFIRMED PREGNANCY: Primary | ICD-10-CM

## 2024-04-25 DIAGNOSIS — N97.9 FEMALE INFERTILITY: Primary | ICD-10-CM

## 2024-04-25 DIAGNOSIS — Z31.83 ENCOUNTER FOR ASSISTED REPRODUCTIVE FERTILITY CYCLE: ICD-10-CM

## 2024-04-25 LAB — PROGEST SERPL-MCNC: 16.5 NG/ML

## 2024-04-25 PROCEDURE — 76998 US GUIDE INTRAOP: CPT | Performed by: OBSTETRICS & GYNECOLOGY

## 2024-04-25 PROCEDURE — 36415 COLL VENOUS BLD VENIPUNCTURE: CPT

## 2024-04-25 PROCEDURE — 84144 ASSAY OF PROGESTERONE: CPT

## 2024-04-25 PROCEDURE — 89253 EMBRYO HATCHING: CPT | Performed by: OBSTETRICS & GYNECOLOGY

## 2024-04-25 PROCEDURE — 89255 PREPARE EMBRYO FOR TRANSFER: CPT | Performed by: OBSTETRICS & GYNECOLOGY

## 2024-04-25 PROCEDURE — 89352 THAWING CRYOPRESRVED EMBRYO: CPT | Performed by: OBSTETRICS & GYNECOLOGY

## 2024-04-25 PROCEDURE — 58974 EMBRYO TRANSFER INTRAUTERINE: CPT | Performed by: OBSTETRICS & GYNECOLOGY

## 2024-04-25 RX ORDER — PROGESTERONE 200 MG/1
200 CAPSULE ORAL 2 TIMES DAILY
Qty: 60 CAPSULE | Refills: 2 | Status: SHIPPED | OUTPATIENT
Start: 2024-04-25 | End: 2024-07-24

## 2024-04-25 NOTE — PROGRESS NOTES
Prometrium rx sent- 200mg PV BID. Recheck prog level Monday.   Mckayla Bernstein 04/25/24 1:57 PM

## 2024-04-25 NOTE — DISCHARGE INSTRUCTIONS
[facility]  [address]. [place]  [address]  Tel: [phone]   Fax: [phone]    Home Going Instructions after Embryo Transfer:     After completing your embryo transfer please treat your body as though you are pregnant.  No smoking, alcohol, or recreational drugs.  Make sure you are taking a prenatal vitamin daily.   Continue all medications currently prescribed for embryo transfer until otherwise instructed by your physician, even if you experience spotting or bleeding and even if you have a negative home pregnancy test.   Medications to avoid in pregnancy: Advil, Motrin, Ibuprofen, Aleve, Excedrin, Say[place], Sudafed, and Pepto-Bismol. Avoid aspirin unless you are taking this daily at the direction of your physician.   Medications that are generally safe in pregnancy: Tylenol, Benadryl, Plain Robitussin, Zyrtec, Claritin, Pepcid, Tums, Rolaids, Mylanta, Nasonex.   Foods to avoid:   Seafood that is high in mercury; you can have canned tuna twice a week.   Deli meats, deli salads, hot dogs, and unpasteurized cheeses due to the risk of Listeria.  Activities to avoid:   No hot tubs, whirlpools, or saunas.  Avoid starting new exercise routines that you have not done previously.  Avoid lifting > 30 lbs.  No cleaning litter boxes.  No intercourse until you test for pregnancy.   You do not need to be on bed rest following the transfer. It is healthy, normal, and recommended to go about routine physical activity.   We advise against taking a home pregnancy test due to the possibility of false negative and false positive results.   You will test for pregnancy in approximately 10 days, at which point you will be about 4 weeks pregnant.   If blood work was drawn on the day of your transfer, you can expect a phone that day with the results of your bloodwork. We expect a progesterone level greater than or equal to 16. If you level is less than 16  we will adjust your progesterone dose and repeat your level in 2 days.     Marci Purdy    11:34 AM

## 2024-04-25 NOTE — TELEPHONE ENCOUNTER
Your progesterone level today is low  Progesterone   Date Value Ref Range Status   04/25/2024 16.5 ng/mL Final    Continue all current medications as prescribed per Dr. Mckayla Bernstein MD. Per Dr. Bernstein, start on Prometrium vaginal suppositories 200 mg twice a day today. Please call the office if you have any questions at [phone].

## 2024-04-25 NOTE — PROGRESS NOTES
"Patient ID: Nicky Herbert \"Yasmeen\" is a 33 y.o. female.    Embryo Transfer    Date/Time: 4/25/2024 12:12 PM    Performed by: Mckayla Bernstein MD  Authorized by: Sofía Eisenberg MD    Consent:     Consent obtained:  Written    Consent given by:  Patient    Procedure risks and benefits discussed: yes      Patient questions answered: yes      Patient agrees, verbalizes understanding, and wants to proceed: yes      Educational handouts given: yes      Instructions and paperwork completed: yes    Procedure:     Pelvic exam performed: No      Cervix cleaned and prepped: Yes      Speculum placed in vagina: Yes      Ultrasound guidance: Yes      Catheter type:  Sure View Kerr    Difficulty: easy      Estimated blood loss:  None  Post-procedure:     Patient tolerated procedure well: yes    Comments:      Preop diagnosis: Infertility  Post op diagnosis: Same  Assistant: None  Depth: 8.5 cm  Curve: 30  Distance from fundus: 12 mm  Embryo stage at day of transfer: day 5 x 2  Embryo notes: 4AA x 2   Additional Notes: Patient counseled re risks of twins and multiples and desires to proceed. Aware of national recs for SET.   Anesthesia: None    IV Fluids: None  UOP: Not recorded  Specimens: None  Complications: None  This replaces an operative report.    Attending Attestation: I was physically present for key and critical portions performed by the fellow. I reviewed the fellow's documentation and discussed the patient with the fellow. I agree with the fellow's medical decision making as documented in the fellow's note.   Mckayla Bernstein MD        "

## 2024-04-29 ENCOUNTER — CLINICAL SUPPORT (OUTPATIENT)
Dept: ENDOCRINOLOGY | Facility: CLINIC | Age: 34
End: 2024-04-29
Payer: COMMERCIAL

## 2024-04-29 DIAGNOSIS — N97.9 FEMALE INFERTILITY: ICD-10-CM

## 2024-04-29 DIAGNOSIS — Z31.41 FERTILITY TESTING: ICD-10-CM

## 2024-04-29 LAB — PROGEST SERPL-MCNC: 19.2 NG/ML

## 2024-04-29 PROCEDURE — 84144 ASSAY OF PROGESTERONE: CPT

## 2024-04-29 PROCEDURE — 36415 COLL VENOUS BLD VENIPUNCTURE: CPT

## 2024-04-29 RX ORDER — PROGESTERONE 50 MG/ML
100 INJECTION, SOLUTION INTRAMUSCULAR DAILY
Qty: 60 ML | Refills: 2 | Status: SHIPPED | OUTPATIENT
Start: 2024-04-29 | End: 2025-04-29

## 2024-04-29 NOTE — PROGRESS NOTES
Requested Prescriptions     Signed Prescriptions Disp Refills    progesterone 50 mg/mL injection 60 mL 2     Sig: Inject 2 mL (100 mg) into the muscle once daily. Inject into the muscle at the same time each morning as directed per provider.     Sent in updated RX with increased KHRIS dosing of 2mL (100mg) to [facility] per protocol.       Nilsa Haynes (Katie), PharmDIONI  [facility]  Clinical Pharmacy Specialist- Fertility   [facility], [place]  [address] [place] [address]  Email: [email or web address]  Tel: [phone]       Fax: [phone]

## 2024-04-29 NOTE — PROGRESS NOTES
Patient here for progesterone level following FET on 4/25/24. Progesterone on day of transfer was 16.5. Started 200mg BID prometrium vaginally.   KEYLA TRIMBLE on 4/29/24 at 7:21 AM.       HUDDLE PROVIDER NOTE- PROGESTERONE CHECK  Ultrasound and/or labs reviewed at [facility].   Results for orders placed or performed in visit on 04/29/24 (from the past 96 hour(s))   Progesterone   Result Value Ref Range    Progesterone 19.2 ng/mL       Will discuss with Dr. Bernstein to increase IM KHRIS to 100 mg.    Shannon Juarez  04/29/2024  12:44 PM    Increase IM KHRIS to 100mg. Agree with note above.   Mckayla Bernstein 04/29/24 12:56 PM      Called patient with plan and left message, will send [employer] as well. Patient to increase to 100 mg or 2ml of KHRIS and continue vaginal progesterone as well.     KEYLA TRIMBLE on 4/29/24 at 1:11 PM.

## 2024-04-30 PROCEDURE — RXMED WILLOW AMBULATORY MEDICATION CHARGE

## 2024-05-01 ENCOUNTER — SPECIALTY PHARMACY (OUTPATIENT)
Dept: PHARMACY | Facility: CLINIC | Age: 34
End: 2024-05-01

## 2024-05-02 ENCOUNTER — PHARMACY VISIT (OUTPATIENT)
Dept: PHARMACY | Facility: CLINIC | Age: 34
End: 2024-05-02
Payer: MEDICAID

## 2024-05-06 ENCOUNTER — CLINICAL SUPPORT (OUTPATIENT)
Dept: ENDOCRINOLOGY | Facility: CLINIC | Age: 34
End: 2024-05-06
Payer: COMMERCIAL

## 2024-05-06 DIAGNOSIS — Z32.00 UNCONFIRMED PREGNANCY: ICD-10-CM

## 2024-05-06 DIAGNOSIS — Z32.01 POSITIVE BLOOD PREGNANCY TEST (HHS-HCC): Primary | ICD-10-CM

## 2024-05-06 DIAGNOSIS — Z32.00 PREGNANCY EXAMINATION OR TEST, PREGNANCY UNCONFIRMED: ICD-10-CM

## 2024-05-06 DIAGNOSIS — N97.9 FEMALE INFERTILITY: ICD-10-CM

## 2024-05-06 LAB
B-HCG SERPL-ACNC: 170 MIU/ML
PROGEST SERPL-MCNC: 35.1 NG/ML

## 2024-05-06 PROCEDURE — 84144 ASSAY OF PROGESTERONE: CPT

## 2024-05-06 PROCEDURE — 84702 CHORIONIC GONADOTROPIN TEST: CPT

## 2024-05-06 PROCEDURE — 36415 COLL VENOUS BLD VENIPUNCTURE: CPT

## 2024-05-06 NOTE — PROGRESS NOTES
Initial HC  Patient's ARTHUR Provider: Sofía Eisenberg MD  IVF Fellow  Infertility dx: Polycystic Ovarian Syndrome  Achieved pregnancy by: Embryo transfer (transferred two embryos)   Fertility medications used this cycle:  KHRIS 75mg but switched to 100mg   KHRIS on  added in vaginal progesterone BID 200mg  (day of transfer), 6mg estrace  LMP: Patient's last menstrual period was 24  EGA based on (IUI date, ET ): embryo transfer date  24. Patient is 4W2D today 24    Updated G/P with this pregnancy:   OB HX:  OB History    Para Term  AB Living   1 1 1   0 1   SAB IAB Ectopic Multiple Live Births   0 0 0 0 1      # Outcome Date GA Lbr Allen/2nd Weight Sex Delivery Anes PTL Lv   1 Term                Type & Screen: O POS   History of miscarriage: No  History of pelvic/abdominal surgeries: No  History of STDs/PID: No  Hx of ectopic: No  Hx of tubal disease/ blockage: No    Risk for ectopic: No      Current medications:     Current Outpatient Medications:     ergocalciferol (Vitamin D-2) 1.25 MG (54432 UT) capsule, Take 1 capsule (1,250 mcg) by mouth 1 (one) time per week., Disp: , Rfl:     estradiol (Estrace) 2 mg tablet, Take 3 tablets (6 mg) by mouth once daily., Disp: 90 tablet, Rfl: 2    estradiol (Estrace) 2 mg tablet, Insert 1 tablet (2 mg) into the vagina 2 times a day., Disp: 60 tablet, Rfl: 0    ferrous sulfate 325 (65 Fe) MG tablet, Take 1 tablet by mouth once daily at bedtime. TAKE WITH VITAMIN C TABLET (OR A GLASS OF ORANGE JUICE), Disp: , Rfl:     folic acid (Folvite) 400 mcg tablet, Take 1 tablet (0.4 mg) by mouth once daily., Disp: , Rfl:     lidocaine-prilocaine (Emla) 2.5-2.5 % cream, Apply to treatment area 1 hour prior to injection., Disp: 30 g, Rfl: 2    Vicki 0.25-35 mg-mcg tablet, TAKE 1 TABLET BY MOUTH ONCE DAILY. TAKE ACTIVE PILLS ONLY AS DIRECTED PER PROVIDER, Disp: 84 tablet, Rfl: 0    omeprazole (PriLOSEC) 40 mg DR capsule, Take 1 capsule (40 mg) by  "mouth once daily., Disp: , Rfl:     progesterone (Prometrium) 200 mg capsule, Insert 1 capsule (200 mg) into the vagina 2 times a day., Disp: 60 capsule, Rfl: 2    progesterone 50 mg/mL injection, Inject 2 mL (100 mg) into the muscle once daily. Inject into the muscle at the same time each morning as directed per provider., Disp: 60 mL, Rfl: 2    transparent dressings 2 3/8 X 2 3/4 \" bandage, Use as directed to cover lidocaine cream., Disp: 30 each, Rfl: 3    valACYclovir (Valtrex) 500 mg tablet, Take 1 tablet (500 mg) by mouth once daily., Disp: , Rfl:     venlafaxine (Effexor) 75 mg tablet, Take 1 tablet (75 mg) by mouth 2 times a day., Disp: , Rfl:     PNV: Yes  Vitamin D 2000 IUs: ONCE A WEEK   Luteal support: IM KHRIS 100 mg daily, Prometrium 200 mg PV BID, and Estrace 6 mg PO daily  Additional medications: baby ASA, venlafaxine, valacyclovir     Labs ordered/Plan:   BhCG ordered. Team will reach out to patient with results and plan.   Discussed early pregnancy versus miscarriage versus ectopic. Precautions given.   Patient expresses understanding of plan and is agreeable.    Keyla Reynoso  05/06/2024  6:59 AM    HUDDLE PROVIDER NOTE  Ultrasound and/or labs reviewed at [facility]uddle.   Results for orders placed or performed in visit on 05/06/24 (from the past 96 hour(s))   Progesterone   Result Value Ref Range    Progesterone 35.1 ng/mL   (Clinic Collect) Human Chorionic Gonadotropin, Serum Quantitative   Result Value Ref Range    HCG, Beta-Quantitative 170 (H) <5 mIU/mL       RTC in ONE WEEK for LABS ONLY VISIT and HCG and progesterone. Continue PV progesterone.  Pt to reach out to prescriber regarding effexor during pregnancy. Will possibly need to decrease/switch meds pending recs.    Shannon Juarez  05/06/2024  12:32 PM    Sent [employer] message with plan.   KEYLA REYNOSO on 5/6/24 at 12:56 PM.         "

## 2024-05-13 ENCOUNTER — CLINICAL SUPPORT (OUTPATIENT)
Dept: ENDOCRINOLOGY | Facility: CLINIC | Age: 34
End: 2024-05-13
Payer: COMMERCIAL

## 2024-05-13 DIAGNOSIS — O36.80X0 ENCOUNTER TO DETERMINE FETAL VIABILITY OF PREGNANCY, NOT APPLICABLE OR UNSPECIFIED FETUS (HHS-HCC): ICD-10-CM

## 2024-05-13 DIAGNOSIS — N97.9 FEMALE INFERTILITY: ICD-10-CM

## 2024-05-13 DIAGNOSIS — Z32.01 POSITIVE BLOOD PREGNANCY TEST (HHS-HCC): ICD-10-CM

## 2024-05-13 LAB
B-HCG SERPL-ACNC: 3457 MIU/ML
PROGEST SERPL-MCNC: 29.8 NG/ML

## 2024-05-13 PROCEDURE — 36415 COLL VENOUS BLD VENIPUNCTURE: CPT

## 2024-05-13 PROCEDURE — 84702 CHORIONIC GONADOTROPIN TEST: CPT

## 2024-05-13 PROCEDURE — 84144 ASSAY OF PROGESTERONE: CPT

## 2024-05-13 NOTE — PROGRESS NOTES
Here for repeat hCG land P4 level S/P FET of #2 embryos on    EGA today = 5 /2024    Initial HC on   HCG today : 3,457  Patient's ARTHUR Provider: Sofía Eisenberg MD  IVF Fellow  Infertility dx: Polycystic Ovarian Syndrome  Achieved pregnancy by: Embryo transfer (transferred two embryos)   Fertility medications used this cycle:  KHRIS 75mg but switched to 100mg   KHRIS on  added in vaginal progesterone BID 200mg  (day of transfer), 6mg estrace  LMP: Patient's last menstrual period was 24  EGA based on (IUI date, ET ): embryo transfer date  24. Patient is 5W2D today     Updated G/P with this pregnancy:   OB HX:                   OB History    Para Term  AB Living   1 1 1   0 1   SAB IAB Ectopic Multiple Live Births      0 0 0 0 1          # Outcome Date GA Lbr Allen/2nd Weight Sex Delivery Anes PTL Lv   1 Term                           Type & Screen: O POS   History of miscarriage: No  History of pelvic/abdominal surgeries: No  History of STDs/PID: No  Hx of ectopic: No  Hx of tubal disease/ blockage: No     Risk for ectopic: No        Current medications:      Current Outpatient Medications:     ergocalciferol (Vitamin D-2) 1.25 MG (32191 UT) capsule, Take 1 capsule (1,250 mcg) by mouth 1 (one) time per week., Disp: , Rfl:     estradiol (Estrace) 2 mg tablet, Take 3 tablets (6 mg) by mouth once daily., Disp: 90 tablet, Rfl: 2    estradiol (Estrace) 2 mg tablet, Insert 1 tablet (2 mg) into the vagina 2 times a day., Disp: 60 tablet, Rfl: 0    ferrous sulfate 325 (65 Fe) MG tablet, Take 1 tablet by mouth once daily at bedtime. TAKE WITH VITAMIN C TABLET (OR A GLASS OF ORANGE JUICE), Disp: , Rfl:     folic acid (Folvite) 400 mcg tablet, Take 1 tablet (0.4 mg) by mouth once daily., Disp: , Rfl:     lidocaine-prilocaine (Emla) 2.5-2.5 % cream, Apply to treatment area 1 hour prior to injection., Disp: 30 g, Rfl: 2    Vicki 0.25-35 mg-mcg tablet, TAKE 1 TABLET BY  "MOUTH ONCE DAILY. TAKE ACTIVE PILLS ONLY AS DIRECTED PER PROVIDER, Disp: 84 tablet, Rfl: 0    omeprazole (PriLOSEC) 40 mg DR capsule, Take 1 capsule (40 mg) by mouth once daily., Disp: , Rfl:     progesterone (Prometrium) 200 mg capsule, Insert 1 capsule (200 mg) into the vagina 2 times a day., Disp: 60 capsule, Rfl: 2    progesterone 50 mg/mL injection, Inject 2 mL (100 mg) into the muscle once daily. Inject into the muscle at the same time each morning as directed per provider., Disp: 60 mL, Rfl: 2    transparent dressings 2 3/8 X 2 3/4 \" bandage, Use as directed to cover lidocaine cream., Disp: 30 each, Rfl: 3    valACYclovir (Valtrex) 500 mg tablet, Take 1 tablet (500 mg) by mouth once daily., Disp: , Rfl:     venlafaxine (Effexor) 75 mg tablet, Take 1 tablet (75 mg) by mouth 2 times a day., Disp: , Rfl:      PNV: Yes  Vitamin D 2000 IUs: ONCE A WEEK   Luteal support: IM KHRIS 100 mg daily, Prometrium 200 mg PV BID, and Estrace 6 mg PO daily  Additional medications: baby ASA, venlafaxine, valacyclovir    KEYLA TRIMBLE on 5/13/24 at 7:04 AM.      [facility] PROVIDER NOTE - HCG REVIEW  Ultrasound and/or labs reviewed at [facility].     Results for orders placed or performed in visit on 05/13/24 (from the past 96 hour(s))   (Clinic Collect) Human Chorionic Gonadotropin, Serum Quantitative   Result Value Ref Range    HCG, Beta-Quantitative 3,457 (H) <5 mIU/mL   Progesterone   Result Value Ref Range    Progesterone 29.8 ng/mL       Lab Results   Component Value Date    HCGQUANT 3,457 (H) 05/13/2024    HCGQUANT 170 (H) 05/06/2024    HCGQUANT 2 04/01/2024       RTC in AT APPROX 6 weeks 5 DAYS GESTATION for OB scan and  no labs .   Delmy Shaikh  05/13/2024  11:32 AM    Called patient with results and plan to schedule OB scan for 6W5D. Patient will continue taking KHRIS and vaginal progesterone. Patient transferred to  to schedule OB scan.   KEYLA TRIMBLE on 5/13/24 at 11:52 AM.       "

## 2024-05-23 ENCOUNTER — OFFICE VISIT (OUTPATIENT)
Dept: ENDOCRINOLOGY | Facility: CLINIC | Age: 34
End: 2024-05-23
Payer: COMMERCIAL

## 2024-05-23 ENCOUNTER — ANCILLARY PROCEDURE (OUTPATIENT)
Dept: ENDOCRINOLOGY | Facility: CLINIC | Age: 34
End: 2024-05-23
Payer: COMMERCIAL

## 2024-05-23 DIAGNOSIS — O36.80X0 ENCOUNTER TO DETERMINE FETAL VIABILITY OF PREGNANCY, SINGLE OR UNSPECIFIED FETUS (HHS-HCC): Primary | ICD-10-CM

## 2024-05-23 DIAGNOSIS — O36.80X0 ENCOUNTER TO DETERMINE FETAL VIABILITY OF PREGNANCY, NOT APPLICABLE OR UNSPECIFIED FETUS (HHS-HCC): ICD-10-CM

## 2024-05-23 PROCEDURE — 99213 OFFICE O/P EST LOW 20 MIN: CPT | Performed by: NURSE PRACTITIONER

## 2024-05-23 PROCEDURE — 76817 TRANSVAGINAL US OBSTETRIC: CPT

## 2024-05-23 PROCEDURE — 3008F BODY MASS INDEX DOCD: CPT | Performed by: NURSE PRACTITIONER

## 2024-05-23 NOTE — PROGRESS NOTES
Visit Type: In Person    OB Scan    Ectopic risk factor: no  PGTA/PGTM: no transerred 2 day 5 4 AA embryos  Blood type: O+    Reviewed results from OB ultrasound today and results reviewed with pt as follows:     OB Scan results:   Concep??on Concep??on: IVF Embryo Transfer  Embryo  transfer  4/25/2024 IVF / ET: 5 d 6 w + 5 d 1/11/2025  U/S 5/23/2024 based upon CRL 6 w + 5 d 1/11/2025  Assigned  da??ng  based on the IVF / ET date, selected on  05/23/2024  6 w + 5 d 1/11/2025  Assessment Gesta??onal sac: visualized. Loca??on: intrauterine  Yolk sac: visualized  Embryo: visualized  Cardiac ac??vity: present  Early placenta: circumferen??al  YS 3.9 mm 7w 1d 63% Papaioannou  CRL 5.8 mm 6w 5d 43% Pexsters   bpm    Detailed discussion with patient about her scan results, pregnancy, and next steps:    Plan:   Reviewed medications in detail. She will continue PNV.   Reviewed supplemental progesterone, route and dose, reviewed dates of cessation. 10 weeks 6 days.  Discussed with patient any pregnancy concerns and discussed establishing care with an  OB.- plan to see Dr. Soriano  Will provide recommendations if she desires.   Advised to call with bleeding, pain or concerns.    Ultrasound results and Plan of care reviewed with Dr. Mckayla Bernstein MD    Fertility Plan Update: Ok to move on to OB care.    Delmy Shaikh  05/23/2024  1:38 PM

## 2024-05-28 ENCOUNTER — SPECIALTY PHARMACY (OUTPATIENT)
Dept: PHARMACY | Facility: CLINIC | Age: 34
End: 2024-05-28

## 2024-05-28 ENCOUNTER — PHARMACY VISIT (OUTPATIENT)
Dept: PHARMACY | Facility: CLINIC | Age: 34
End: 2024-05-28
Payer: MEDICAID

## 2024-05-28 PROCEDURE — RXMED WILLOW AMBULATORY MEDICATION CHARGE

## 2024-06-03 DIAGNOSIS — N97.9 FEMALE INFERTILITY: ICD-10-CM

## 2024-06-03 RX ORDER — ESTRADIOL 2 MG/1
6 TABLET ORAL DAILY
Qty: 90 TABLET | Refills: 1 | Status: SHIPPED | OUTPATIENT
Start: 2024-06-03 | End: 2025-06-03

## 2024-06-10 ENCOUNTER — TELEPHONE (OUTPATIENT)
Dept: RADIOLOGY | Facility: CLINIC | Age: 34
End: 2024-06-10
Payer: COMMERCIAL

## 2024-06-10 NOTE — TELEPHONE ENCOUNTER
LM with patient that she will continue both the estrace and progesterone in oil up until 10 weeks and 6 days gestation. Based off last OB Scan, patient was 6 weeks and 5 days on 5/23, so will continue both medications until 6/21. [employer] message also sent to patient to confirm dates, and make sure she does not need refill of any medications.      LESA HARDIN on 6/10/24 at 9:28 AM.

## 2024-06-13 ENCOUNTER — TELEPHONE (OUTPATIENT)
Dept: OBSTETRICS AND GYNECOLOGY | Facility: HOSPITAL | Age: 34
End: 2024-06-13
Payer: COMMERCIAL

## 2024-06-21 ENCOUNTER — TELEPHONE (OUTPATIENT)
Dept: OBSTETRICS AND GYNECOLOGY | Facility: HOSPITAL | Age: 34
End: 2024-06-21
Payer: COMMERCIAL

## 2024-07-09 ENCOUNTER — HOSPITAL ENCOUNTER (OUTPATIENT)
Dept: RADIOLOGY | Facility: CLINIC | Age: 34
Discharge: HOME | End: 2024-07-09
Payer: COMMERCIAL

## 2024-07-09 DIAGNOSIS — Z36.82 ENCOUNTER FOR ANTENATAL SCREENING FOR NUCHAL TRANSLUCENCY (HHS-HCC): ICD-10-CM

## 2024-07-09 DIAGNOSIS — O99.211 OBESITY COMPLICATING PREGNANCY, FIRST TRIMESTER (HHS-HCC): ICD-10-CM

## 2024-07-09 DIAGNOSIS — Z34.90 ENCOUNTER FOR SUPERVISION OF NORMAL PREGNANCY, UNSPECIFIED, UNSPECIFIED TRIMESTER (HHS-HCC): ICD-10-CM

## 2024-07-09 PROCEDURE — 76813 OB US NUCHAL MEAS 1 GEST: CPT | Performed by: OBSTETRICS & GYNECOLOGY

## 2024-07-09 PROCEDURE — 76813 OB US NUCHAL MEAS 1 GEST: CPT

## 2024-08-26 ENCOUNTER — HOSPITAL ENCOUNTER (OUTPATIENT)
Dept: PEDIATRIC CARDIOLOGY | Facility: HOSPITAL | Age: 34
Discharge: HOME | End: 2024-08-26
Payer: COMMERCIAL

## 2024-08-26 ENCOUNTER — APPOINTMENT (OUTPATIENT)
Dept: PEDIATRIC CARDIOLOGY | Facility: HOSPITAL | Age: 34
End: 2024-08-26
Payer: COMMERCIAL

## 2024-08-26 ENCOUNTER — HOSPITAL ENCOUNTER (OUTPATIENT)
Dept: RADIOLOGY | Facility: CLINIC | Age: 34
Discharge: HOME | End: 2024-08-26
Payer: COMMERCIAL

## 2024-08-26 ENCOUNTER — OFFICE VISIT (OUTPATIENT)
Dept: PEDIATRIC CARDIOLOGY | Facility: HOSPITAL | Age: 34
End: 2024-08-26
Payer: COMMERCIAL

## 2024-08-26 VITALS
HEART RATE: 115 BPM | WEIGHT: 293 LBS | OXYGEN SATURATION: 97 % | BODY MASS INDEX: 41.95 KG/M2 | DIASTOLIC BLOOD PRESSURE: 80 MMHG | HEIGHT: 70 IN | SYSTOLIC BLOOD PRESSURE: 136 MMHG

## 2024-08-26 DIAGNOSIS — O09.02: ICD-10-CM

## 2024-08-26 DIAGNOSIS — O35.8XX0 MATERNAL CARE FOR OTHER (SUSPECTED) FETAL ABNORMALITY AND DAMAGE, NOT APPLICABLE OR UNSPECIFIED (HHS-HCC): ICD-10-CM

## 2024-08-26 DIAGNOSIS — O35.9XX0 MATERNAL CARE FOR (SUSPECTED) FETAL ABNORMALITY AND DAMAGE, UNSPECIFIED, NOT APPLICABLE OR UNSPECIFIED (HHS-HCC): ICD-10-CM

## 2024-08-26 DIAGNOSIS — Z34.90 ENCOUNTER FOR SUPERVISION OF NORMAL PREGNANCY, UNSPECIFIED, UNSPECIFIED TRIMESTER (HHS-HCC): ICD-10-CM

## 2024-08-26 DIAGNOSIS — O99.212 OBESITY COMPLICATING PREGNANCY, SECOND TRIMESTER (HHS-HCC): ICD-10-CM

## 2024-08-26 DIAGNOSIS — O35.9XX0 SUSPECTED FETAL ANOMALY, ANTEPARTUM, SINGLE OR UNSPECIFIED FETUS (HHS-HCC): ICD-10-CM

## 2024-08-26 DIAGNOSIS — O35.9XX0 SUSPECTED FETAL ABNORMALITY AFFECTING MANAGEMENT OF MOTHER, SINGLE OR UNSPECIFIED FETUS (HHS-HCC): Primary | ICD-10-CM

## 2024-08-26 PROCEDURE — 76827 ECHO EXAM OF FETAL HEART: CPT | Performed by: PEDIATRICS

## 2024-08-26 PROCEDURE — 76825 ECHO EXAM OF FETAL HEART: CPT | Performed by: PEDIATRICS

## 2024-08-26 PROCEDURE — 76811 OB US DETAILED SNGL FETUS: CPT | Performed by: MEDICAL GENETICS

## 2024-08-26 PROCEDURE — 99204 OFFICE O/P NEW MOD 45 MIN: CPT | Performed by: PEDIATRICS

## 2024-08-26 PROCEDURE — 76811 OB US DETAILED SNGL FETUS: CPT

## 2024-08-26 PROCEDURE — 99214 OFFICE O/P EST MOD 30 MIN: CPT | Performed by: PEDIATRICS

## 2024-08-26 PROCEDURE — 3008F BODY MASS INDEX DOCD: CPT | Performed by: PEDIATRICS

## 2024-08-26 PROCEDURE — 93325 DOPPLER ECHO COLOR FLOW MAPG: CPT | Performed by: PEDIATRICS

## 2024-08-26 PROCEDURE — 76827 ECHO EXAM OF FETAL HEART: CPT

## 2024-08-26 NOTE — PATIENT INSTRUCTIONS
On fetal echocardiogram today the structure, size, function (squeeze), and rhythm of your fetus' heart were normal.  There are some limitations to fetal echocardiogram as described below, and because it is not a perfect test it is important to know that we cannot rule out all possible heart problems (see below).  We will communicate these results with your obstetrician.    It was a pleasure to see you today.  We do not need to see you back for routine follow-up during the remainder of your pregnancy.  However, we would be happy to see you back if new issues or concerns arise.  After birth your baby does not need to see a cardiologist unless the pediatric team has concerns.  If you have any questions or concerns regarding this evaluation, please do not hesitate to contact me.    Ladi Moran MD   of Pediatrics  Division of Pediatric Cardiology  [place] [address]  Phone: [phone]  Fax: [phone]  e-mail: [email or web address]    [email or web address]    Normal Fetal Echocardiogram    Today you had an ultrasound of your fetus' heart (fetal echocardiogram).  Based on all of the pictures taken today, the heart appears normal and is developing as expected for this point in your pregnancy.   Therefore, no further testing is recommended at this time.    Despite today´s normal findings, it is impossible to rule out all heart problems before birth.  This is partially because the fetus´ heart is so small, and our machine´s technology can only see structures down to a certain size.  It is also because blood flow in a fetus is different and more complicated than blood flow after birth.    Briefly:  ° Fetuses get oxygen from the placenta instead of their lungs.  High-oxygen (red) blood from the placenta comes back to the right side of the fetus´  heart.  ° Red blood crosses to the left side of the heart through a hole between the upper chambers of the heart, the foramen ovale.  The foramen ovale lets the red blood flow to the body.  ° Low-oxygen (blue) blood stays on the right side of the heart.  After leaving the heart, the blue blood flows through a special blood vessel known as the ductus arteriosus.  The ductus arteriosus allows the blue blood to bypass the lungs and return to the placenta to get oxygen.  ° After birth the foramen ovale and ductus arteriosus should close, but sometimes they do not.  It is impossible to predict in which children these structures will remain open.    ° Thus, on fetal echo we cannot rule out that your baby will have a patent foramen ovale (PFO) or patent ductus arteriosus (PDA) after birth.    In addition, fetal echocardiography can miss other heart defects including:  ° Small or medium-sized holes between the upper or lower heart chambers.  ° Minor abnormalities of the heart valves.  ° Narrowing of the main artery that goes to the body (coarctation of the aorta).  ° Other rare abnormalities of the veins or arteries.    If any of these defects are present, there should be findings after birth that will alert your child´s doctor that there is a problem and prompt further evaluation.  After delivery, if your pediatrician has any concerns, your child's heart can be reevaluated at that time.

## 2024-08-26 NOTE — PROGRESS NOTES
[facility]  [facility]  Division of Pediatric Cardiology  [facility]  [address] [address] [place], [address]st [address], [place] [address]  Tel: [phone], Fax [phone]      Obstetrician: Dr. Tamika Taylor     Nicky Herbert was seen at the request of  Dr. Tamika Taylor  for conception via in vitro fertilization.  Records were reviewed, and a summary of those records is integrated within the history of present illness.  A report with my findings is being sent via written or electronic means to the referring physician with my recommendations.    History obtained from: patient    History of Presentation   History of Present Illness:   Nicky Herbert is a 34 y.o. female presenting for initial prenatal cardiology consultation and fetal echocardiogram for conception via in vitro fertilization.  She is   and approximately 20 2/7 weeks pregnant (Last Menstrual Period unknown)  Estimated Date of Delivery: 2025) with a Male fetus.  Her obstetric history is significant for one term vaginal delivery.  Complications of her current pregnancy include advanced maternal age and obesity (BMI 44).  Ms. Nicky Herbert had a normal first trimester screen.  Her level 2 obstetric ultrasound for anatomy was performed earlier today at 20 weeks gestational age with final results pending.  She has undergone cell free fetal DNA testing and it was normal.  She has not had invasive genetic testing such as amniocentesis or chorionic villous sampling.  This pregnancy was the result of in vitro fertilization.  She was not using potentially teratogenic medication at the time of conception.  There have been no other complications of this pregnancy.  Ms. Nicky Herbert plans to deliver her baby at [facility].    Ms. Nicky Herbert feels well today.  She denies any shortness of  breath, abdominal cramping, contractions, bleeding, or swelling of the extremities.  She notes frequent fetal movement.        Medical History     Medical Conditions:  Patient Active Problem List   Diagnosis    Vertigo    Secondary female infertility    S/P laparoscopic sleeve gastrectomy    Paroxysmal supraventricular tachycardia (CMS-HCC)    Osteoarthritis of knee    VIK (obstructive sleep apnea)    Oligomenorrhea    Obesity    Hypothyroid    History of colonic polyps    Hiatal hernia    Genital herpes    Gastric ulcer    Female hirsutism    Duodenitis    Anxiety    Anemia    History of migraine headaches     Past Surgeries:  Past Surgical History:   Procedure Laterality Date    ABDOMINAL SURGERY  2022    BARIATRIC SURGERY  2020    OTHER SURGICAL HISTORY  10/24/2014    Cardiac Catheter His Ablation    OTHER SURGICAL HISTORY  02/08/2024    Oocyte  Retrieval       Current Outpatient Medications   Medication Instructions    ergocalciferol (Vitamin D-2) 1.25 MG (01778 UT) capsule 1 capsule, oral, Once Weekly    estradiol (ESTRACE) 6 mg, oral, Daily    estradiol (ESTRACE) 2 mg, vaginal, 2 times daily    estradiol (ESTRACE) 6 mg, oral, Daily    ferrous sulfate 325 (65 Fe) MG tablet 65 mg of iron, oral, Nightly, TAKE WITH VITAMIN C TABLET (OR A GLASS OF ORANGE JUICE)<BR>    folic acid (FOLVITE) 0.4 mg, oral, Daily    lidocaine-prilocaine (Emla) 2.5-2.5 % cream Apply to treatment area 1 hour prior to injection.    Vicki 0.25-35 mg-mcg tablet TAKE 1 TABLET BY MOUTH ONCE DAILY. TAKE ACTIVE PILLS ONLY AS DIRECTED PER PROVIDER    omeprazole (PRILOSEC) 40 mg, oral, Daily    progesterone 100 mg, intramuscular, Daily, Inject into the muscle at the same time each morning as directed per provider.    valACYclovir (VALTREX) 500 mg, oral, Daily    venlafaxine (EFFEXOR) 75 mg, oral, 2 times daily      Allergies:  Patient has no known allergies.    Social History:  Patient lives with spouse and children.  Occupation: Finance at  "[employer]  Smoking:  previous smoker  Alcohol: None  Drug Use: None  She wears a seatbelt while in the car. She denies any verbal, sexual or physical abuse.     Cardiac Family History (for patient and father of baby):  's father with heart attack before 55  years of age. There is no history of congenital heart disease.  There is no history of early sudden/unexplained death including SIDS and drowning.  There is no history of cardiomyopathy of any type or heart transplant.  There is no history of pacemaker/defibrillator or arrhythmia syndromes, including Long QT syndrome, Marva-Parkinson-White syndrome or Brugada syndrome.  There is no history of stroke before age 55 in a close family member.  There is no history of Marfan syndrome or aortic aneurysm.  There is no history of deafness.  There is no history of syncope/fainting.  There is no history of high blood pressure or high cholesterol.  There is no history of DiGeorge Syndrome (22q11).    Physical Examination     /80 (BP Location: Right arm)   Pulse (!) 115   Ht 1.77 m (5' 9.69\")   Wt 147 kg (324 lb 4.8 oz)   SpO2 97%   BMI 46.95 kg/m²     General: Alert, well-appearing and in no acute distress.    Abdomen: Soft, nontender, not distended. Gravid.  Extremities: No swelling or edema.  Neurologic / Psychiatric: Grossly intact without focal deficits.  Appropriate demeanor.      Results     Fetal Echocardiogram:    I ordered and interpreted a transabdominal fetal echocardiogram.  I performed a portion of the study myself.  The complete report is available under separate cover.  The results are summarized as follows:    Image quality: Good  Cardiac situs: Cardiac mass in the left chest.  Left ventricular apex points leftward.  Segmental anatomy: Atrio-ventricular concordance.  Ventriculo-arterial concordance.  Normally-related great arteries.  Superior vena cava: Normal connection to the right atrium.  Inferior vena cava: Normal " connection to the right atrium.  Pulmonary veins: At least one pulmonary vein connects normally to the left atrium.  Atrial septum: Patent foramen ovale, with flap valve bowing into the left atrium.  Tricuspid valve: Structurally normal.  No obvious stenosis or insufficiency.  Mitral valve: Structurally normal.  No obvious stenosis or insufficiency.  Right ventricle: Normal ventricular size, wall thickness, and systolic function (qualitative).  Left ventricle: Normal ventricular size, wall thickness, and systolic function (qualitative).  Interventricular septum: No obvious septal defect.  Aortic valve: Structurally normal.  No obvious stenosis or insufficiency.  Pulmonary valve: Structurally normal.  No obvious stenosis or insufficiency.  Pulmonary artery: Normal in size.  Ductal arch: Patent with right to left shunting.  Aortic arch: Left-sided.  Patent.  Pericardial effusion: None.  Umbilical arteries: Two umbilical arteries.  Normal arterial flow pattern.  Umbilical vein: Normal venous flow pattern.  Electrophysiology: Normal fetal heart rate and rhythm.  Normal mechanical WA interval.      Assessment & Plan   Assessment:  Ms. Nicky Herbert is a 34 y.o. female who is  and currently at Unknown weeks gestational age with a male fetus.  A fetal echocardiogram was performed today for conception via in vitro fertilization.     Fetal echocardiogram today demonstrated grossly normal fetal cardiac anatomy, qualitatively normal fetal cardiac function, normal fetal heart rhythm, and no evidence of in utero congestive heart failure or hydrops fetalis.  I reviewed the results of today's evaluation, including the findings of the fetal echocardiogram, with Ms. Nicky Herbert in detail.      I discussed limitations of the technology of fetal echocardiography with Ms. Nicky Herbert in detail.  I explained that fetal echocardiography cannot exclude all forms of congenital heart disease.  Fetal  echocardiography may be insensitive to some defects of atrial and ventricular septation, minor valvular abnormalities, partial anomalous pulmonary venous return, and coarctation of the aorta.  In addition, normal fetal echocardiogram does not ensure that the fetal ductus arteriosus or foramen ovale will close.      Recommendations:  No changes to prenatal care.    Further fetal cardiac imaging is not required at this time.  We would be happy to see Ms. Nicky Herbert in the future if new concerns arise.    Triage code 0:        No changes to delivery planning.  Delivery per obstetrics at patient´s preferred hospital.  Standard  care per  team.        No pediatric cardiology consult needed after birth unless there are concerns/issues.    I spent greater than 60 minutes in performance of this consultation, of which greater than 50% was related to coordination of care or counseling.    This assessment and plan, in addition to the results of relevant testing were explained to Nicky. All questions were answered and understanding was demonstrated.    It was a pleasure to see Ms. Nicky Herbert today.  If you have any questions or concerns regarding this evaluation, do not hesitate to contact me.      Ladi Moran MD, FACC, FAAP   of Pediatrics  Division of Pediatric Cardiology  [place] [address]  Phone: [phone]  Fax: [phone]  e-mail: [email or web address]

## 2024-08-26 NOTE — LETTER
2024     LIONEL Overton  [address] [place] [address]  Department Of Ob/Gyn-Nurse Midwifery  [place] [address]    Patient: Yasmeen Herbert   YOB: 1990   Date of Visit: 2024       Dear Dr. Rachel Duffy, LIONEL:    Thank you for referring Yasmeen Herbert to me for evaluation. Below are my notes for this consultation.  If you have questions, please do not hesitate to call me. I look forward to following your patient along with you.       Sincerely,     Ladi Moran MD      CC: MD Rosi Crocker, CHEYANNE-CNP  LIONEL Nolen, DNP  ______________________________________________________________________________________         [facility]  [facility]  Division of Pediatric Cardiology  [facility]  [address] [place] [address], [address]st [address] [address]  Tel: [phone], Fax [phone]      Obstetrician: Dr. Tamika Taylor     Nicky Herbert was seen at the request of  Dr. Tamika Taylor  for conception via in vitro fertilization.  Records were reviewed, and a summary of those records is integrated within the history of present illness.  A report with my findings is being sent via written or electronic means to the referring physician with my recommendations.    History obtained from: patient    History of Presentation   History of Present Illness:   Nicky Herbert is a 34 y.o. female presenting for initial prenatal cardiology consultation and fetal echocardiogram for conception via in vitro fertilization.  She is   and approximately 20 2/7 weeks pregnant (Last Menstrual Period unknown)  Estimated Date of Delivery: 2025) with a Male fetus.  Her obstetric history is significant for one term vaginal delivery.  Complications of her current pregnancy include advanced maternal age and obesity (BMI 44).  Ms. iNcky Herbert had  a normal first trimester screen.  Her level 2 obstetric ultrasound for anatomy was performed earlier today at 20 weeks gestational age with final results pending.  She has undergone cell free fetal DNA testing and it was normal.  She has not had invasive genetic testing such as amniocentesis or chorionic villous sampling.  This pregnancy was the result of in vitro fertilization.  She was not using potentially teratogenic medication at the time of conception.  There have been no other complications of this pregnancy.  Ms. Nicky Herbert plans to deliver her baby at [facility].    Ms. Nicky Herbert feels well today.  She denies any shortness of breath, abdominal cramping, contractions, bleeding, or swelling of the extremities.  She notes frequent fetal movement.        Medical History     Medical Conditions:  Patient Active Problem List   Diagnosis   • Vertigo   • Secondary female infertility   • S/P laparoscopic sleeve gastrectomy   • Paroxysmal supraventricular tachycardia (CMS-HCC)   • Osteoarthritis of knee   • VIK (obstructive sleep apnea)   • Oligomenorrhea   • Obesity   • Hypothyroid   • History of colonic polyps   • Hiatal hernia   • Genital herpes   • Gastric ulcer   • Female hirsutism   • Duodenitis   • Anxiety   • Anemia   • History of migraine headaches     Past Surgeries:  Past Surgical History:   Procedure Laterality Date   • ABDOMINAL SURGERY  2022   • BARIATRIC SURGERY  2020   • OTHER SURGICAL HISTORY  10/24/2014    Cardiac Catheter His Ablation   • OTHER SURGICAL HISTORY  02/08/2024    Oocyte  Retrieval       Current Outpatient Medications   Medication Instructions   • ergocalciferol (Vitamin D-2) 1.25 MG (76567 UT) capsule 1 capsule, oral, Once Weekly   • estradiol (ESTRACE) 6 mg, oral, Daily   • estradiol (ESTRACE) 2 mg, vaginal, 2 times daily   • estradiol (ESTRACE) 6 mg, oral, Daily   • ferrous sulfate 325 (65 Fe) MG tablet 65 mg of iron, oral, Nightly, TAKE WITH  "VITAMIN C TABLET (OR A GLASS OF ORANGE JUICE)<BR>   • folic acid (FOLVITE) 0.4 mg, oral, Daily   • lidocaine-prilocaine (Emla) 2.5-2.5 % cream Apply to treatment area 1 hour prior to injection.   • Vicki 0.25-35 mg-mcg tablet TAKE 1 TABLET BY MOUTH ONCE DAILY. TAKE ACTIVE PILLS ONLY AS DIRECTED PER PROVIDER   • omeprazole (PRILOSEC) 40 mg, oral, Daily   • progesterone 100 mg, intramuscular, Daily, Inject into the muscle at the same time each morning as directed per provider.   • valACYclovir (VALTREX) 500 mg, oral, Daily   • venlafaxine (EFFEXOR) 75 mg, oral, 2 times daily      Allergies:  Patient has no known allergies.    Social History:  Patient lives with spouse and children.  Occupation: Finance at [employer]  Smoking:  previous smoker  Alcohol: None  Drug Use: None  She wears a seatbelt while in the car. She denies any verbal, sexual or physical abuse.     Cardiac Family History (for patient and father of baby):  's father with heart attack before 55  years of age. There is no history of congenital heart disease.  There is no history of early sudden/unexplained death including SIDS and drowning.  There is no history of cardiomyopathy of any type or heart transplant.  There is no history of pacemaker/defibrillator or arrhythmia syndromes, including Long QT syndrome, Marva-Parkinson-White syndrome or Brugada syndrome.  There is no history of stroke before age 55 in a close family member.  There is no history of Marfan syndrome or aortic aneurysm.  There is no history of deafness.  There is no history of syncope/fainting.  There is no history of high blood pressure or high cholesterol.  There is no history of DiGeorge Syndrome (22q11).    Physical Examination     /80 (BP Location: Right arm)   Pulse (!) 115   Ht 1.77 m (5' 9.69\")   Wt 147 kg (324 lb 4.8 oz)   SpO2 97%   BMI 46.95 kg/m²     General: Alert, well-appearing and in no acute distress.    Abdomen: Soft, nontender, not " distended. Gravid.  Extremities: No swelling or edema.  Neurologic / Psychiatric: Grossly intact without focal deficits.  Appropriate demeanor.      Results     Fetal Echocardiogram:    I ordered and interpreted a transabdominal fetal echocardiogram.  I performed a portion of the study myself.  The complete report is available under separate cover.  The results are summarized as follows:    Image quality: Good  Cardiac situs: Cardiac mass in the left chest.  Left ventricular apex points leftward.  Segmental anatomy: Atrio-ventricular concordance.  Ventriculo-arterial concordance.  Normally-related great arteries.  Superior vena cava: Normal connection to the right atrium.  Inferior vena cava: Normal connection to the right atrium.  Pulmonary veins: At least one pulmonary vein connects normally to the left atrium.  Atrial septum: Patent foramen ovale, with flap valve bowing into the left atrium.  Tricuspid valve: Structurally normal.  No obvious stenosis or insufficiency.  Mitral valve: Structurally normal.  No obvious stenosis or insufficiency.  Right ventricle: Normal ventricular size, wall thickness, and systolic function (qualitative).  Left ventricle: Normal ventricular size, wall thickness, and systolic function (qualitative).  Interventricular septum: No obvious septal defect.  Aortic valve: Structurally normal.  No obvious stenosis or insufficiency.  Pulmonary valve: Structurally normal.  No obvious stenosis or insufficiency.  Pulmonary artery: Normal in size.  Ductal arch: Patent with right to left shunting.  Aortic arch: Left-sided.  Patent.  Pericardial effusion: None.  Umbilical arteries: Two umbilical arteries.  Normal arterial flow pattern.  Umbilical vein: Normal venous flow pattern.  Electrophysiology: Normal fetal heart rate and rhythm.  Normal mechanical IA interval.      Assessment & Plan   Assessment:  Ms. Nicky Herbert is a 34 y.o. female who is  and currently at Unknown weeks  gestational age with a male fetus.  A fetal echocardiogram was performed today for conception via in vitro fertilization.     Fetal echocardiogram today demonstrated grossly normal fetal cardiac anatomy, qualitatively normal fetal cardiac function, normal fetal heart rhythm, and no evidence of in utero congestive heart failure or hydrops fetalis.  I reviewed the results of today's evaluation, including the findings of the fetal echocardiogram, with Ms. Nicky Herbert in detail.      I discussed limitations of the technology of fetal echocardiography with Ms. Nicky Herbert in detail.  I explained that fetal echocardiography cannot exclude all forms of congenital heart disease.  Fetal echocardiography may be insensitive to some defects of atrial and ventricular septation, minor valvular abnormalities, partial anomalous pulmonary venous return, and coarctation of the aorta.  In addition, normal fetal echocardiogram does not ensure that the fetal ductus arteriosus or foramen ovale will close.      Recommendations:  No changes to prenatal care.    Further fetal cardiac imaging is not required at this time.  We would be happy to see Ms. Nicky Herbert in the future if new concerns arise.    Triage code 0:        No changes to delivery planning.  Delivery per obstetrics at patient´s preferred hospital.  Standard  care per  team.        No pediatric cardiology consult needed after birth unless there are concerns/issues.    I spent greater than 60 minutes in performance of this consultation, of which greater than 50% was related to coordination of care or counseling.    This assessment and plan, in addition to the results of relevant testing were explained to Nicky. All questions were answered and understanding was demonstrated.    It was a pleasure to see Ms. Nicky Herbert today.  If you have any questions or concerns regarding this evaluation, do not hesitate to contact me.       Ladi Moran MD, FACC, FAAP   of Pediatrics  Division of Pediatric Cardiology  [place] [address]  Phone: [phone]  Fax: [phone]  e-mail: [email or web address]

## 2024-09-05 ENCOUNTER — APPOINTMENT (OUTPATIENT)
Dept: PRIMARY CARE | Facility: CLINIC | Age: 34
End: 2024-09-05
Payer: COMMERCIAL

## 2024-09-05 ENCOUNTER — APPOINTMENT (OUTPATIENT)
Dept: OBSTETRICS AND GYNECOLOGY | Facility: CLINIC | Age: 34
End: 2024-09-05
Payer: COMMERCIAL

## 2024-09-05 VITALS — SYSTOLIC BLOOD PRESSURE: 120 MMHG | BODY MASS INDEX: 44.88 KG/M2 | DIASTOLIC BLOOD PRESSURE: 84 MMHG | WEIGHT: 293 LBS

## 2024-09-05 DIAGNOSIS — O99.019: ICD-10-CM

## 2024-09-05 DIAGNOSIS — O99.842 BARIATRIC SURGERY STATUS COMPLICATING PREGNANCY, SECOND TRIMESTER (HHS-HCC): ICD-10-CM

## 2024-09-05 DIAGNOSIS — Z78.9 CONCEIVED BY IN VITRO FERTILIZATION: ICD-10-CM

## 2024-09-05 DIAGNOSIS — O99.210 OBESITY IN PREGNANCY (HHS-HCC): ICD-10-CM

## 2024-09-05 DIAGNOSIS — Z3A.21 21 WEEKS GESTATION OF PREGNANCY (HHS-HCC): Primary | ICD-10-CM

## 2024-09-05 DIAGNOSIS — O99.340 ANXIETY IN PREGNANCY, ANTEPARTUM (HHS-HCC): ICD-10-CM

## 2024-09-05 DIAGNOSIS — F41.9 ANXIETY IN PREGNANCY, ANTEPARTUM (HHS-HCC): ICD-10-CM

## 2024-09-05 DIAGNOSIS — Z23 NEED FOR INFLUENZA VACCINATION: ICD-10-CM

## 2024-09-05 DIAGNOSIS — A60.04 HERPES SIMPLEX VULVOVAGINITIS: ICD-10-CM

## 2024-09-05 PROBLEM — K29.80 DUODENITIS: Status: RESOLVED | Noted: 2019-10-14 | Resolved: 2024-09-05

## 2024-09-05 PROBLEM — K25.9 GASTRIC ULCER: Status: RESOLVED | Noted: 2019-10-14 | Resolved: 2024-09-05

## 2024-09-05 RX ORDER — ASPIRIN 81 MG/1
81 TABLET ORAL DAILY
COMMUNITY

## 2024-09-05 ASSESSMENT — EDINBURGH POSTNATAL DEPRESSION SCALE (EPDS)
TOTAL SCORE: 0
I HAVE BEEN SO UNHAPPY THAT I HAVE HAD DIFFICULTY SLEEPING: NOT AT ALL
I HAVE FELT SAD OR MISERABLE: NO, NOT AT ALL
I HAVE BEEN SO UNHAPPY THAT I HAVE BEEN CRYING: NO, NEVER
I HAVE BLAMED MYSELF UNNECESSARILY WHEN THINGS WENT WRONG: NO, NEVER
I HAVE BEEN ANXIOUS OR WORRIED FOR NO GOOD REASON: NO, NOT AT ALL
I HAVE FELT SCARED OR PANICKY FOR NO GOOD REASON: NO, NOT AT ALL
I HAVE LOOKED FORWARD WITH ENJOYMENT TO THINGS: AS MUCH AS I EVER DID
THE THOUGHT OF HARMING MYSELF HAS OCCURRED TO ME: NEVER
THINGS HAVE BEEN GETTING ON TOP OF ME: NO, I HAVE BEEN COPING AS WELL AS EVER
I HAVE BEEN ABLE TO LAUGH AND SEE THE FUNNY SIDE OF THINGS: AS MUCH AS I ALWAYS COULD

## 2024-09-05 NOTE — PROGRESS NOTES
"NEW OB VISIT    Assessment/Plan    Problem List Items Addressed This Visit       Obesity in pregnancy ([facility])    Overview     - Recommend serial growth at 30 and 36 weeks  -  surveillance weekly starting at 34 weeks (NST or BPP)  - delivery 39 weeks or sooner if indicated           Genital herpes    Overview     Already on suppression with valtrex, rare outbreaks         Anemia affecting second pregnancy ([facility])    Overview     Do not have records with previous Hgb levels  Pt reports 3 IV iron transfusions earlier this pregnancy, last in   Will check CBC with second trimester labs         Anxiety in pregnancy, antepartum ([facility])    Overview     Well controlled on effexor         Bariatric surgery status complicating pregnancy, second trimester ([facility])    Overview     - Due to increased risk for micronutrient deficiencies recommend monitoring levels q trimester with evaluation of iron, vitamin D, vitamin B12, folic acid, and calcium levels. Will add onto second trimester labs  - Patient does not have dumping syndrome and may undertake standard GDM testing  - Recommend serial growth ultrasounds in third trimester             Relevant Orders    Iron and TIBC    Vitamin D 25-Hydroxy,Total (for eval of Vitamin D levels)    Vitamin B12, Pregnancy    Folate    Calcium    Conceived by in vitro fertilization    Overview     - option for fetal echo   - Recommend growth ultrasounds at 30 and 36 weeks   - weekly  surveillance from 36 weeks to delivery  - Delivery at 39-40 weeks or sooner as indicated           21 weeks gestation of pregnancy ([facility]) - Primary    Overview     Desired provider in labor: [] CNM  [x] Physician  [] Blood Products: [] Yes, accepts [] No, needs counseling  [x] Initial BMI: Could not be calculated   [] Prenatal Labs: [place] [facility] [place]  [x] Cervical Cancer Screening up to date, neg co-testing 2023  [] Rh status:   [] Genetic Screening:  cf DNA \"No call\" " "at 10-11 weeks gestation, [place] [facility] [place], discussed this result and option for genetic counseling/diagnostic testing and she declines  [] NT US: (11-13 wks)  [x] Baby ASA (if indicated): Indicated and taking  [x] Pregnancy dated by: day 5 ET    [] Anatomy US: (19-20 wks): incomplete, follow up planned  [] Federal Sterilization consent signed (if indicated):  [] 1hr GCT at 24-28wks: ordered for next visit  [] Rhogam (if indicated):   [] Fetal Surveillance (if indicated):  [] Tdap (27-32 wks, may be given up to 36 wks if initial window missed):   [] RSV (32-36 wks) (Sept. to end ):   [x] Flu Vaccine: Given 24    [] Breastfeeding:  [] Postpartum Birth control method:   [] GBS at 36 - 37 wks:  [] 39 weeks discussion of IOL vs. Expectant management:  [] Mode of delivery ( anticipated ):           Relevant Orders    CBC Anemia Panel With Reflex,Pregnancy    Glucose, 1 Hour Screen, Pregnancy    Syphilis Screen with Reflex     Other Visit Diagnoses       Need for influenza vaccination        Relevant Orders    Flu vaccine, trivalent, preservative free, age 6 months and greater (Fluraix/Fluzone/Flulaval) (Completed)          Discussed in depth the \"no call\" cell [place] [facility] [place] She already has a new blood drawn pending. Discussed that cf DNA is still a screening test and that there is an increased risk of abnormalities with \"no call\" tests. Offered referral to  genetic counseling with option for diagnostic testing. She declines and would like to wiat for her test to result    Follow up in 4 weeks or sooner as needed    Kandice Castellon MD        Subjective     Nicky Herbert \"Yasmeen\" is a 34 y.o.  at 21w5d by embryo transfer presents for an initial prenatal visit as a transfer of care from [facility].    She reports an uncomplicated pregnancy so far.   No records received yet.  cfDNA drawn between 10-11 weeks and was a \"no call.\" It was repeated and is pending.   Daughter " broke clavicle at  birth, delivery note reviewed by Dr. Portillo and no history of shoulder dystocia    Iron infusions x 3 at [facility] [place], Last checked     NIPT between 10-11 weeks and was no call, pending now    OB Hx:   2010: , daughter, 15yo now, baby had broken clavicle - delivery note reviewed and no shoulder dystocia identified, reports that she pushed 17 min, 7lb 4oz  Past Medical Hx: HSV - rare outbreaks, anemia, anxiety/panic disorder  Past Surgical Hx: cardiac ablation for SVT at 14yo, gastric sleeve , tummy tuck   Social Hx: Denies tobacco, alcohol, drugs  Family Hx: one cousin with trisomy 21, one cousin with autism, one cousin with CP  Medications: valtrex 500mg daily, vitamin D, effexor, omeprazole, baby aspirin 81mg  Allergies: None    Pap Hx: 2023 NILM/neg HPV  Flu vaccine: accepts flu vaccine today       Physical Exam  Objective   /84   Wt 141 kg (310 lb)   LMP 2024 (Approximate)   BMI 44.88 kg/m²      General:   Alert and oriented, in no acute distress           Abdomen: Soft, non-tender, without masses or organomegaly   Vulva: Normal architecture without erythema, masses, or lesions.    Vagina: Normal mucosa without lesions, masses, or atrophy. No abnormal vaginal discharge.    Cervix: Normal without masses, lesions, or signs of cervicitis.    Uterus: Normal mobile, appropriate for gestational age   Adnexa: Normal without masses or lesions   Pelvic Floor No POP noted.    Psych Normal affect. Normal mood.

## 2024-09-12 DIAGNOSIS — A60.04 HERPES SIMPLEX VULVOVAGINITIS: Primary | ICD-10-CM

## 2024-09-12 RX ORDER — VALACYCLOVIR HYDROCHLORIDE 500 MG/1
500 TABLET, FILM COATED ORAL DAILY
Qty: 90 TABLET | Refills: 3 | Status: SHIPPED | OUTPATIENT
Start: 2024-09-12

## 2024-09-23 ENCOUNTER — APPOINTMENT (OUTPATIENT)
Dept: OBSTETRICS AND GYNECOLOGY | Facility: CLINIC | Age: 34
End: 2024-09-23
Payer: COMMERCIAL

## 2024-09-27 ENCOUNTER — APPOINTMENT (OUTPATIENT)
Dept: RADIOLOGY | Facility: CLINIC | Age: 34
End: 2024-09-27
Payer: COMMERCIAL

## 2024-10-03 ENCOUNTER — APPOINTMENT (OUTPATIENT)
Dept: OBSTETRICS AND GYNECOLOGY | Facility: CLINIC | Age: 34
End: 2024-10-03
Payer: COMMERCIAL

## 2024-10-03 ENCOUNTER — HOSPITAL ENCOUNTER (OUTPATIENT)
Dept: RADIOLOGY | Facility: CLINIC | Age: 34
Discharge: HOME | End: 2024-10-03
Payer: COMMERCIAL

## 2024-10-03 ENCOUNTER — LAB (OUTPATIENT)
Dept: LAB | Facility: LAB | Age: 34
End: 2024-10-03
Payer: COMMERCIAL

## 2024-10-03 ENCOUNTER — APPOINTMENT (OUTPATIENT)
Dept: PRIMARY CARE | Facility: CLINIC | Age: 34
End: 2024-10-03
Payer: COMMERCIAL

## 2024-10-03 VITALS — WEIGHT: 293 LBS | DIASTOLIC BLOOD PRESSURE: 70 MMHG | BODY MASS INDEX: 44.59 KG/M2 | SYSTOLIC BLOOD PRESSURE: 128 MMHG

## 2024-10-03 DIAGNOSIS — O99.210 OBESITY IN PREGNANCY (HHS-HCC): Primary | ICD-10-CM

## 2024-10-03 DIAGNOSIS — Z3A.25 25 WEEKS GESTATION OF PREGNANCY (HHS-HCC): ICD-10-CM

## 2024-10-03 DIAGNOSIS — O99.340 ANXIETY IN PREGNANCY, ANTEPARTUM (HHS-HCC): ICD-10-CM

## 2024-10-03 DIAGNOSIS — F41.9 ANXIETY IN PREGNANCY, ANTEPARTUM (HHS-HCC): ICD-10-CM

## 2024-10-03 DIAGNOSIS — E03.9 HYPOTHYROIDISM, UNSPECIFIED TYPE: ICD-10-CM

## 2024-10-03 DIAGNOSIS — O99.842 BARIATRIC SURGERY STATUS COMPLICATING PREGNANCY, SECOND TRIMESTER (HHS-HCC): ICD-10-CM

## 2024-10-03 DIAGNOSIS — Z3A.21 21 WEEKS GESTATION OF PREGNANCY (HHS-HCC): ICD-10-CM

## 2024-10-03 DIAGNOSIS — G47.33 OSA (OBSTRUCTIVE SLEEP APNEA): ICD-10-CM

## 2024-10-03 DIAGNOSIS — A60.04 HERPES SIMPLEX VULVOVAGINITIS: ICD-10-CM

## 2024-10-03 DIAGNOSIS — Z78.9 CONCEIVED BY IN VITRO FERTILIZATION: ICD-10-CM

## 2024-10-03 DIAGNOSIS — O99.019: ICD-10-CM

## 2024-10-03 DIAGNOSIS — I47.10 PAROXYSMAL SUPRAVENTRICULAR TACHYCARDIA (CMS-HCC): ICD-10-CM

## 2024-10-03 DIAGNOSIS — Z34.90 ENCOUNTER FOR SUPERVISION OF NORMAL PREGNANCY, UNSPECIFIED, UNSPECIFIED TRIMESTER: ICD-10-CM

## 2024-10-03 PROBLEM — K44.9 HIATAL HERNIA: Status: RESOLVED | Noted: 2020-03-10 | Resolved: 2024-10-03

## 2024-10-03 PROBLEM — Z86.69 HISTORY OF MIGRAINE HEADACHES: Status: RESOLVED | Noted: 2024-02-23 | Resolved: 2024-10-03

## 2024-10-03 LAB
25(OH)D3 SERPL-MCNC: 19 NG/ML (ref 30–100)
CALCIUM SERPL-MCNC: 9.2 MG/DL (ref 8.6–10.6)
ERYTHROCYTE [DISTWIDTH] IN BLOOD BY AUTOMATED COUNT: 19.9 % (ref 11.5–14.5)
FERRITIN SERPL-MCNC: 11 NG/ML
FOLATE SERPL-MCNC: 12 NG/ML
GLUCOSE 1H P 50 G GLC PO SERPL-MCNC: 117 MG/DL
HCT VFR BLD AUTO: 33.4 % (ref 36–46)
HGB BLD-MCNC: 9.9 G/DL (ref 12–16)
IRON SATN MFR SERPL: ABNORMAL %
IRON SATN MFR SERPL: NORMAL %
IRON SERPL-MCNC: 15 UG/DL (ref 35–150)
IRON SERPL-MCNC: 28 UG/DL
MCH RBC QN AUTO: 22.4 PG (ref 26–34)
MCHC RBC AUTO-ENTMCNC: 29.6 G/DL (ref 32–36)
MCV RBC AUTO: 76 FL (ref 80–100)
NRBC BLD-RTO: 0 /100 WBCS (ref 0–0)
PLATELET # BLD AUTO: 422 X10*3/UL (ref 150–450)
RBC # BLD AUTO: 4.42 X10*6/UL (ref 4–5.2)
REFLEX ADDED, ANEMIA PANEL: NORMAL
TIBC SERPL-MCNC: ABNORMAL UG/DL
TIBC SERPL-MCNC: NORMAL UG/DL
TREPONEMA PALLIDUM IGG+IGM AB [PRESENCE] IN SERUM OR PLASMA BY IMMUNOASSAY: NONREACTIVE
UIBC SERPL-MCNC: >450 UG/DL
UIBC SERPL-MCNC: >450 UG/DL (ref 110–370)
VIT B12 SERPL-MCNC: 280 PG/ML
WBC # BLD AUTO: 10.8 X10*3/UL (ref 4.4–11.3)

## 2024-10-03 PROCEDURE — 36415 COLL VENOUS BLD VENIPUNCTURE: CPT

## 2024-10-03 PROCEDURE — 76816 OB US FOLLOW-UP PER FETUS: CPT | Performed by: STUDENT IN AN ORGANIZED HEALTH CARE EDUCATION/TRAINING PROGRAM

## 2024-10-03 PROCEDURE — 76816 OB US FOLLOW-UP PER FETUS: CPT

## 2024-10-03 PROCEDURE — 0501F PRENATAL FLOW SHEET: CPT | Performed by: OBSTETRICS & GYNECOLOGY

## 2024-10-03 NOTE — PROGRESS NOTES
"Ob Follow-up  10/03/24     SUBJECTIVE      HPI: Nicky Herbert \"Yasmeen\" is a 34 y.o.  at 25w5d here for RPNV.  Reports normal fetal movement. Patient reports symptoms of carpal tunnel       OBJECTIVE  Visit Vitals  /70   Wt 140 kg (308 lb)   LMP 2024 (Approximate)   BMI 44.59 kg/m²   OB Status Pregnant   Smoking Status Former   BSA 2.62 m²          ASSESSMENT & PLAN    Nicky Herbert \"Yasmeen\" is a 34 y.o.  at 25w5d here for the following concerns we addressed today:    Problem List Items Addressed This Visit       Paroxysmal supraventricular tachycardia ([facility])    Overview     Last Assessment & Plan: Formatting of this note might be different from the original. Had ablation surgery when she was 13 yrs Stable currently and denies any issues CHEYANNE Hernandez-CNP         VIK (obstructive sleep apnea)    Overview     Dx with mild VIK in 2019 prior to bariatric surgery.   Never on treatment, symptoms improved after surgery         Obesity in pregnancy ([facility]) - Primary    Overview     - Recommend serial growth at 30 and 36 weeks  -  surveillance weekly starting at 34 weeks (NST or BPP)  - delivery 39 weeks or sooner if indicated           Genital herpes    Overview     Already on suppression with valtrex, rare outbreaks         Anemia affecting second pregnancy ([facility])    Overview     Do not have records with previous Hgb levels  Pt reports 3 IV iron transfusions earlier this pregnancy, last in   Will check CBC with second trimester labs         Anxiety in pregnancy, antepartum ([facility])    Overview     Well controlled on effexor         Bariatric surgery status complicating pregnancy, second trimester ([facility])    Overview     - Due to increased risk for micronutrient deficiencies recommend monitoring levels q trimester with evaluation of iron, vitamin D, vitamin B12, folic acid, and calcium levels. Will add onto second trimester labs  - Patient does not have " "dumping syndrome and may undertake standard GDM testing  - Recommend serial growth ultrasounds in third trimester             Conceived by in vitro fertilization    Overview     - normal fetal echo  - Recommend growth ultrasounds at 30 and 36 weeks   - weekly  surveillance from 36 weeks to delivery  - Delivery at 39-40 weeks or sooner as indicated           25 weeks gestation of pregnancy ([facility])    Overview     Desired provider in labor: [] CNM  [x] Physician  [] Blood Products: [] Yes, accepts [] No, needs counseling  [x] Initial BMI: 45  [] Prenatal Labs: Second request for records from [facility] [place] sent  [x] Cervical Cancer Screening up to date, neg co-testing 2023  [] Rh status:   [x] Genetic Screening:  cf DNA \"No call\" at 10-11 weeks gestation, re drawn at [facility] and rr cfDNA. discussed this result and option for genetic counseling/diagnostic testing and she declines  [x] NT US: (11-13 wks) Normal  [x] Baby ASA (if indicated): Indicated and taking  [x] Pregnancy dated by: day 5 ET    [] Anatomy US: (19-20 wks): incomplete, follow up planned later today   [] Federal Sterilization consent signed (if indicated):  [] 1hr GCT at 24-28wks: pending from today   [] Rhogam (if indicated):   [] Fetal Surveillance (if indicated):  [] Tdap (27-32 wks, may be given up to 36 wks if initial window missed):   [] RSV (32-36 wks) (Sept. to end of ):   [x] Flu Vaccine: Given 24    [] Breastfeeding:  [] Postpartum Birth control method:   [] GBS at 36 - 37 wks:  [] 39 weeks discussion of IOL vs. Expectant management:  [] Mode of delivery ( anticipated ):            Other Visit Diagnoses       Hypothyroidism, unspecified type                RTC in 4 weeks      Kandice Castellon MD     "

## 2024-10-04 ENCOUNTER — PATIENT MESSAGE (OUTPATIENT)
Dept: OBSTETRICS AND GYNECOLOGY | Facility: CLINIC | Age: 34
End: 2024-10-04
Payer: COMMERCIAL

## 2024-10-09 ENCOUNTER — DOCUMENTATION (OUTPATIENT)
Dept: INFUSION THERAPY | Facility: CLINIC | Age: 34
End: 2024-10-09
Payer: COMMERCIAL

## 2024-10-09 DIAGNOSIS — O99.842 BARIATRIC SURGERY STATUS COMPLICATING PREGNANCY, SECOND TRIMESTER (HHS-HCC): ICD-10-CM

## 2024-10-09 DIAGNOSIS — O99.019: Primary | ICD-10-CM

## 2024-10-09 RX ORDER — FAMOTIDINE 10 MG/ML
20 INJECTION INTRAVENOUS ONCE AS NEEDED
OUTPATIENT
Start: 2024-10-09

## 2024-10-09 RX ORDER — EPINEPHRINE 0.3 MG/.3ML
0.3 INJECTION SUBCUTANEOUS EVERY 5 MIN PRN
OUTPATIENT
Start: 2024-10-09

## 2024-10-09 RX ORDER — ALBUTEROL SULFATE 0.83 MG/ML
3 SOLUTION RESPIRATORY (INHALATION) AS NEEDED
OUTPATIENT
Start: 2024-10-09

## 2024-10-09 RX ORDER — DIPHENHYDRAMINE HYDROCHLORIDE 50 MG/ML
50 INJECTION INTRAMUSCULAR; INTRAVENOUS AS NEEDED
OUTPATIENT
Start: 2024-10-09

## 2024-10-09 NOTE — PROGRESS NOTES
Patient to be scheduled for venofer infusions.     For Diagnosis: Iron Deficiency Anemia    Urine Hcg test ordered prior to first infusion?  No--Patient is 26 weeks pregnant (If female pt <60 years of age and with reproductive capability)  (If urine Hcg test ordered please instruct pt upon scheduling to drink 32 ounces of water 1 hour before arrival so bladder is full for needed urine sample)    Review of Labs:  Lab Results   Component Value Date    HGB 9.9 (L) 10/03/2024    FERRIPREG 11 (L) 10/03/2024    IRON 15 (L) 10/03/2024    TIBC  10/03/2024      Comment:      One or more of the analytes used in this calculation is outside of the analytical measurement range.      TIBCPREG  10/03/2024      Comment:      One or more of the analytes used in this calculation is outside the analytical range.  TIBC-PREGNANCY    Non-pregnancy     240 - 445 ug/dL  First Trimester   278 - 403 ug/dL  Second Trimester  325 - 514 ug/dL  Third Trimester   359 - 609 ug/dL    Please note results will not flag based on reference ranges above.    MCHC 29.6 (L) 10/03/2024    MCV 76 (L) 10/03/2024    MCH 22.4 (L) 10/03/2024      Lab Results   Component Value Date    IRONSAT  10/03/2024      Comment:      One or more analytes used in this calculation is outside of the analytical measurement range. Calculation cannot be performed.    IRONSATP  10/03/2024      Comment:      % SATURATION-PREGNANCY    Non-pregnancy      25 -  45 %  First Trimester     7 -  57 %  Second Trimester   10 -  44 %  Third Trimester     5 -  37 %    Please note results will not flag based on reference ranges above.  One or more analytes used in this calculation is outside of the analytical measurement range. Calculation cannot be performed.        Do labs confirm diagnosis of iron deficiency? Yes    (If NO please reach out to prescribing provider prior to proceeding)      Okay to schedule for treatment as ordered by prescribing provider.

## 2024-10-31 ENCOUNTER — APPOINTMENT (OUTPATIENT)
Dept: OBSTETRICS AND GYNECOLOGY | Facility: CLINIC | Age: 34
End: 2024-10-31
Payer: COMMERCIAL

## 2024-10-31 ENCOUNTER — APPOINTMENT (OUTPATIENT)
Dept: PRIMARY CARE | Facility: CLINIC | Age: 34
End: 2024-10-31
Payer: COMMERCIAL

## 2024-10-31 VITALS — WEIGHT: 293 LBS | SYSTOLIC BLOOD PRESSURE: 102 MMHG | DIASTOLIC BLOOD PRESSURE: 72 MMHG | BODY MASS INDEX: 43.9 KG/M2

## 2024-10-31 DIAGNOSIS — Z78.9 CONCEIVED BY IN VITRO FERTILIZATION: ICD-10-CM

## 2024-10-31 DIAGNOSIS — O99.842 BARIATRIC SURGERY STATUS COMPLICATING PREGNANCY, SECOND TRIMESTER (HHS-HCC): ICD-10-CM

## 2024-10-31 DIAGNOSIS — A60.04 HERPES SIMPLEX VULVOVAGINITIS: ICD-10-CM

## 2024-10-31 DIAGNOSIS — I47.10 PAROXYSMAL SUPRAVENTRICULAR TACHYCARDIA (CMS-HCC): ICD-10-CM

## 2024-10-31 DIAGNOSIS — O99.210 OBESITY IN PREGNANCY (HHS-HCC): ICD-10-CM

## 2024-10-31 DIAGNOSIS — G47.33 OSA (OBSTRUCTIVE SLEEP APNEA): ICD-10-CM

## 2024-10-31 DIAGNOSIS — Z98.84 S/P LAPAROSCOPIC SLEEVE GASTRECTOMY: ICD-10-CM

## 2024-10-31 DIAGNOSIS — F41.9 ANXIETY IN PREGNANCY, ANTEPARTUM (HHS-HCC): ICD-10-CM

## 2024-10-31 DIAGNOSIS — O99.340 ANXIETY IN PREGNANCY, ANTEPARTUM (HHS-HCC): ICD-10-CM

## 2024-10-31 DIAGNOSIS — Z23 NEED FOR TDAP VACCINATION: ICD-10-CM

## 2024-10-31 DIAGNOSIS — Z3A.29 29 WEEKS GESTATION OF PREGNANCY (HHS-HCC): Primary | ICD-10-CM

## 2024-10-31 DIAGNOSIS — O99.019: ICD-10-CM

## 2024-11-03 ENCOUNTER — HOSPITAL ENCOUNTER (OUTPATIENT)
Facility: HOSPITAL | Age: 34
Discharge: HOME | End: 2024-11-04
Attending: OBSTETRICS & GYNECOLOGY | Admitting: OBSTETRICS & GYNECOLOGY
Payer: COMMERCIAL

## 2024-11-03 SDOH — SOCIAL STABILITY: SOCIAL INSECURITY: ABUSE SCREEN: ADULT

## 2024-11-03 SDOH — SOCIAL STABILITY: SOCIAL INSECURITY: HAS ANYONE EVER THREATENED TO HURT YOUR FAMILY OR YOUR PETS?: NO

## 2024-11-03 SDOH — HEALTH STABILITY: MENTAL HEALTH: WERE YOU ABLE TO COMPLETE ALL THE BEHAVIORAL HEALTH SCREENINGS?: YES

## 2024-11-03 SDOH — ECONOMIC STABILITY: TRANSPORTATION INSECURITY: IN THE PAST 12 MONTHS, HAS LACK OF TRANSPORTATION KEPT YOU FROM MEDICAL APPOINTMENTS OR FROM GETTING MEDICATIONS?: NO

## 2024-11-03 SDOH — SOCIAL STABILITY: SOCIAL INSECURITY: VERBAL ABUSE: DENIES

## 2024-11-03 SDOH — HEALTH STABILITY: MENTAL HEALTH: WISH TO BE DEAD (PAST 1 MONTH): NO

## 2024-11-03 SDOH — SOCIAL STABILITY: SOCIAL INSECURITY: WITHIN THE LAST YEAR, HAVE YOU BEEN HUMILIATED OR EMOTIONALLY ABUSED IN OTHER WAYS BY YOUR PARTNER OR EX-PARTNER?: NO

## 2024-11-03 SDOH — SOCIAL STABILITY: SOCIAL INSECURITY
WITHIN THE LAST YEAR, HAVE YOU BEEN KICKED, HIT, SLAPPED, OR OTHERWISE PHYSICALLY HURT BY YOUR PARTNER OR EX-PARTNER?: NO

## 2024-11-03 SDOH — ECONOMIC STABILITY: FOOD INSECURITY
WITHIN THE PAST 12 MONTHS, YOU WORRIED THAT YOUR FOOD WOULD RUN OUT BEFORE YOU GOT THE MONEY TO BUY MORE.: PATIENT DECLINED

## 2024-11-03 SDOH — SOCIAL STABILITY: SOCIAL INSECURITY: DOES ANYONE TRY TO KEEP YOU FROM HAVING/CONTACTING OTHER FRIENDS OR DOING THINGS OUTSIDE YOUR HOME?: NO

## 2024-11-03 SDOH — SOCIAL STABILITY: SOCIAL INSECURITY: WITHIN THE LAST YEAR, HAVE YOU BEEN AFRAID OF YOUR PARTNER OR EX-PARTNER?: NO

## 2024-11-03 SDOH — HEALTH STABILITY: MENTAL HEALTH: SUICIDAL BEHAVIOR (LIFETIME): NO

## 2024-11-03 SDOH — ECONOMIC STABILITY: FOOD INSECURITY: WITHIN THE PAST 12 MONTHS, THE FOOD YOU BOUGHT JUST DIDN'T LAST AND YOU DIDN'T HAVE MONEY TO GET MORE.: PATIENT DECLINED

## 2024-11-03 SDOH — SOCIAL STABILITY: SOCIAL INSECURITY: ARE YOU OR HAVE YOU BEEN THREATENED OR ABUSED PHYSICALLY, EMOTIONALLY, OR SEXUALLY BY ANYONE?: NO

## 2024-11-03 SDOH — SOCIAL STABILITY: SOCIAL INSECURITY: HAVE YOU HAD ANY THOUGHTS OF HARMING ANYONE ELSE?: NO

## 2024-11-03 SDOH — SOCIAL STABILITY: SOCIAL INSECURITY: DO YOU FEEL ANYONE HAS EXPLOITED OR TAKEN ADVANTAGE OF YOU FINANCIALLY OR OF YOUR PERSONAL PROPERTY?: NO

## 2024-11-03 SDOH — SOCIAL STABILITY: SOCIAL INSECURITY: ARE THERE ANY APPARENT SIGNS OF INJURIES/BEHAVIORS THAT COULD BE RELATED TO ABUSE/NEGLECT?: NO

## 2024-11-03 SDOH — HEALTH STABILITY: MENTAL HEALTH: NON-SPECIFIC ACTIVE SUICIDAL THOUGHTS (PAST 1 MONTH): NO

## 2024-11-03 SDOH — SOCIAL STABILITY: SOCIAL INSECURITY: HAVE YOU HAD THOUGHTS OF HARMING ANYONE ELSE?: NO

## 2024-11-03 SDOH — ECONOMIC STABILITY: HOUSING INSECURITY: DO YOU FEEL UNSAFE GOING BACK TO THE PLACE WHERE YOU ARE LIVING?: NO

## 2024-11-03 SDOH — SOCIAL STABILITY: SOCIAL INSECURITY
WITHIN THE LAST YEAR, HAVE YOU BEEN RAPED OR FORCED TO HAVE ANY KIND OF SEXUAL ACTIVITY BY YOUR PARTNER OR EX-PARTNER?: NO

## 2024-11-03 SDOH — SOCIAL STABILITY: SOCIAL INSECURITY: PHYSICAL ABUSE: DENIES

## 2024-11-03 SDOH — ECONOMIC STABILITY: FOOD INSECURITY: HOW HARD IS IT FOR YOU TO PAY FOR THE VERY BASICS LIKE FOOD, HOUSING, MEDICAL CARE, AND HEATING?: NOT HARD AT ALL

## 2024-11-03 ASSESSMENT — PATIENT HEALTH QUESTIONNAIRE - PHQ9
2. FEELING DOWN, DEPRESSED OR HOPELESS: NOT AT ALL
SUM OF ALL RESPONSES TO PHQ9 QUESTIONS 1 & 2: 0
1. LITTLE INTEREST OR PLEASURE IN DOING THINGS: NOT AT ALL

## 2024-11-03 ASSESSMENT — LIFESTYLE VARIABLES
SKIP TO QUESTIONS 9-10: 1
HOW MANY STANDARD DRINKS CONTAINING ALCOHOL DO YOU HAVE ON A TYPICAL DAY: PATIENT DOES NOT DRINK
HOW OFTEN DO YOU HAVE A DRINK CONTAINING ALCOHOL: NEVER
HOW OFTEN DO YOU HAVE 6 OR MORE DRINKS ON ONE OCCASION: NEVER
AUDIT-C TOTAL SCORE: 0
AUDIT-C TOTAL SCORE: 0

## 2024-11-03 ASSESSMENT — ACTIVITIES OF DAILY LIVING (ADL): LACK_OF_TRANSPORTATION: NO

## 2024-11-04 ENCOUNTER — HOSPITAL ENCOUNTER (OUTPATIENT)
Facility: HOSPITAL | Age: 34
End: 2024-11-04
Attending: OBSTETRICS & GYNECOLOGY | Admitting: OBSTETRICS & GYNECOLOGY
Payer: COMMERCIAL

## 2024-11-04 ENCOUNTER — APPOINTMENT (OUTPATIENT)
Dept: INFUSION THERAPY | Facility: CLINIC | Age: 34
End: 2024-11-04
Payer: COMMERCIAL

## 2024-11-04 VITALS
TEMPERATURE: 96.6 F | SYSTOLIC BLOOD PRESSURE: 139 MMHG | RESPIRATION RATE: 16 BRPM | OXYGEN SATURATION: 97 % | HEART RATE: 99 BPM | DIASTOLIC BLOOD PRESSURE: 86 MMHG

## 2024-11-04 PROCEDURE — 99214 OFFICE O/P EST MOD 30 MIN: CPT | Mod: 25

## 2024-11-04 PROCEDURE — 59025 FETAL NON-STRESS TEST: CPT | Mod: GC

## 2024-11-04 PROCEDURE — 99214 OFFICE O/P EST MOD 30 MIN: CPT

## 2024-11-04 PROCEDURE — 59025 FETAL NON-STRESS TEST: CPT

## 2024-11-04 ASSESSMENT — PAIN SCALES - GENERAL: PAINLEVEL_OUTOF10: 0 - NO PAIN

## 2024-11-04 NOTE — H&P
OB Triage H&P    Assessment/Plan    Nicky Herbert is a 34 y.o.  at 30w2d, FLACO: 2025, by Embryo Transfer, who presents to triage with decreased fetal movement.    DFM - Resolved  Fetal Status  - Fetal monitoring reassuring, NST AGA  - Now reporting fetal movement while in triage  - Up to date on prenatal care  - Continue routine prenatal care    Dispo  - Patient appropriate for discharge home, agrees with plan  - Return precautions discussed   - Follow up at next scheduled OB appointment or to triage sooner as needed    Seen and discussed with Dr. Baldwin.   Tamika Potts MD   PGY-2, Labor and Delivery     Pregnancy Problems (from 24 to present)       Problem Noted Diagnosed Resolved    Anxiety in pregnancy, antepartum ([facility]) 2024 by Kandice Castellon MD  No    Priority:  Medium       Overview Signed 2024  1:23 PM by Kandice Castellon MD     Well controlled on effexor         Bariatric surgery status complicating pregnancy, second trimester ([facility]) 2024 by Kandice Castellon MD  No    Priority:  Medium       Overview Addendum 2024 12:46 PM by Kandice Castellon MD     - low vitamin D, started supplement, will repeat next visit  - iron deficiency anemia, IV infusions scheduled  - Recommend serial growth ultrasounds in third trimester             29 weeks gestation of pregnancy ([facility]) 2024 by Kandice Castellon MD  No    Priority:  Medium       Overview Addendum 2024 12:42 PM by Kandice Castellon MD     Desired provider in labor: [] CNM  [x] Physician  [] Blood Products: [] Yes, accepts [] No, needs counseling  [x] Initial BMI: 45  [] Prenatal Labs: Second request for records from [facility] sent. Still did not receive them by apt on 10/31. Recommend repeating them. She desires to call [facility] after appointment to get records.   [x] Cervical Cancer Screening up to date, neg co-testing 2023  [x] Rh status: O+ on chart review  [x] Genetic  "Screening:  cf DNA \"No call\" at 10-11 weeks gestation, re drawn at [facility] and rr cfDNA. discussed this result and option for genetic counseling/diagnostic testing and she declines  [x] NT US: (11-13 wks) Normal  [x] Baby ASA (if indicated): Indicated and taking  [x] Pregnancy dated by: day 5 ET    [x] Anatomy US: (19-20 wks): completed and normal  [] Federal Sterilization consent signed (if indicated):  [x] 1hr GCT at 24-28wks: normal, 117  [x] Rhogam (if indicated): O+ on chart review, not indicated  [] Fetal Surveillance (if indicated):  [x] Tdap (27-32 wks, may be given up to 36 wks if initial window missed): Given 10/31  [] RSV (32-36 wks) (Sept. to end of ):   [x] Flu Vaccine: Given 24    [] Breastfeeding:  [] Postpartum Birth control method:   [] GBS at 36 - 37 wks:  [] 39 weeks discussion of IOL vs. Expectant management:  [] Mode of delivery ( anticipated ):           Obesity in pregnancy ([facility]) 10/17/2023 by Lupe Bah  No    Priority:  Medium       Overview Signed 2024  1:21 PM by Kandice Castellon MD     - Recommend serial growth at 30 and 36 weeks  -  surveillance weekly starting at 34 weeks (NST or BPP)  - delivery 39 weeks or sooner if indicated           Anemia affecting second pregnancy ([facility]) 10/17/2023 by Lupe Bah  No    Priority:  Medium       Overview Addendum 2024 12:45 PM by Kandice Castellon MD     Pt reports 3 IV iron transfusions earlier this pregnancy, Hgb 9.9 on 10/3/24, ferritin 11  IV iron transfusions ordered, first one on 10/31                 Subjective   34 y.o.  at 30w2d by embryo transfer presenting for decreased fetal movement.  Now reporting fetal movement in triage. Otherwise denies VB, CTX, LOF.    Pregnancy Notable for:  - s/p gastric bypass surgery  - Iron deficiency anemia, undergoing IV Iron infusions currently  - VIK  - Paroxysmal SVT, s/p ablation at 13 yrs old  - IVF pregnancy  - Anxiety on Effexor  - HSV2 on " valtrex    Prenatal Provider MD Cande    OB History    Para Term  AB Living   2 1 1 0 0 1   SAB IAB Ectopic Multiple Live Births   0 0 0 0 1      # Outcome Date GA Lbr Allen/2nd Weight Sex Type Anes PTL Lv   2 Current            1 Term 09/07/10 39w5d  3.289 kg F Vag-Spont EPI N SINA       Past Surgical History:   Procedure Laterality Date    ABDOMINAL SURGERY      BARIATRIC SURGERY      CARDIAC SURGERY      COLONOSCOPY      OTHER SURGICAL HISTORY  10/24/2014    Cardiac Catheter His Ablation    OTHER SURGICAL HISTORY  2024    Oocyte  Retrieval    TONSILLECTOMY         Social History     Tobacco Use    Smoking status: Former     Current packs/day: 0.00     Average packs/day: 0.3 packs/day for 2.0 years (0.5 ttl pk-yrs)     Types: Cigarettes     Quit date: 3/1/2024     Years since quittin.6    Smokeless tobacco: Never   Substance Use Topics    Alcohol use: Never       No Known Allergies    Medications Prior to Admission   Medication Sig Dispense Refill Last Dose/Taking    aspirin 81 mg EC tablet Take 1 tablet (81 mg) by mouth once daily.       ergocalciferol (Vitamin D-2) 1.25 MG (09843 UT) capsule Take 1 capsule (1,250 mcg) by mouth 1 (one) time per week.       omeprazole (PriLOSEC) 40 mg DR capsule Take 1 capsule (40 mg) by mouth once daily.       valACYclovir (Valtrex) 500 mg tablet Take 1 tablet (500 mg) by mouth once daily. 90 tablet 3     venlafaxine (Effexor) 75 mg tablet Take 1 tablet (75 mg) by mouth 2 times a day.        Objective     Last Vitals  Temp Pulse Resp BP MAP O2 Sat   36.4 °C (97.5 °F) 101 16 127/63 88 96 %     Blood Pressures         2024  0000 2024  0004          BP: 127/63 127/63               Physical Exam  General: NAD, mood appropriate  Cardiopulmonary: warm and well perfused, breathing comfortably on room air  Abdomen: Gravid, non-tender  Extremities: Symmetric  Speculum Exam: deferred     Fetal Monitoring  Baseline: 135 bpm, Variability:  moderate,  Accelerations: present and Decelerations: none  Uterine Activity: No contractions seen on toco  Interpretation: Reactive    Bedside ultrasound: No    Labs in chart were reviewed.          Prenatal labs reviewed, not remarkable.

## 2024-11-05 ENCOUNTER — HOSPITAL ENCOUNTER (OUTPATIENT)
Dept: RADIOLOGY | Facility: CLINIC | Age: 34
Discharge: HOME | End: 2024-11-05
Payer: COMMERCIAL

## 2024-11-05 DIAGNOSIS — O09.293 SUPERVISION OF PREGNANCY WITH OTHER POOR REPRODUCTIVE OR OBSTETRIC HISTORY, THIRD TRIMESTER: ICD-10-CM

## 2024-11-05 DIAGNOSIS — O09.513 SUPERVISION OF ELDERLY PRIMIGRAVIDA, THIRD TRIMESTER: ICD-10-CM

## 2024-11-05 DIAGNOSIS — O99.213 OBESITY COMPLICATING PREGNANCY, THIRD TRIMESTER (HHS-HCC): ICD-10-CM

## 2024-11-05 DIAGNOSIS — O36.5930 MATERNAL CARE FOR OTHER KNOWN OR SUSPECTED POOR FETAL GROWTH, THIRD TRIMESTER, NOT APPLICABLE OR UNSPECIFIED: ICD-10-CM

## 2024-11-05 DIAGNOSIS — Z34.90 ENCOUNTER FOR SUPERVISION OF NORMAL PREGNANCY, UNSPECIFIED, UNSPECIFIED TRIMESTER: ICD-10-CM

## 2024-11-05 PROCEDURE — 76819 FETAL BIOPHYS PROFIL W/O NST: CPT | Performed by: MEDICAL GENETICS

## 2024-11-05 PROCEDURE — 76816 OB US FOLLOW-UP PER FETUS: CPT | Performed by: MEDICAL GENETICS

## 2024-11-05 PROCEDURE — 76819 FETAL BIOPHYS PROFIL W/O NST: CPT

## 2024-11-05 PROCEDURE — 76816 OB US FOLLOW-UP PER FETUS: CPT

## 2024-11-08 ENCOUNTER — TELEPHONE (OUTPATIENT)
Dept: OBSTETRICS AND GYNECOLOGY | Facility: CLINIC | Age: 34
End: 2024-11-08

## 2024-11-08 ENCOUNTER — APPOINTMENT (OUTPATIENT)
Dept: INFUSION THERAPY | Facility: CLINIC | Age: 34
End: 2024-11-08
Payer: COMMERCIAL

## 2024-11-08 NOTE — TELEPHONE ENCOUNTER
----- Message from Kandice Castellon sent at 11/8/2024  8:18 AM EST -----  Regarding: FW: no showed for her iv iron appts  Can you touch base with her and see if she wants to reschedule or find a different location?  ----- Message -----  From: ELSA Mendosa  Sent: 11/8/2024   7:53 AM EST  To: Kandice Castellon MD; #  Subject: no showed for her iv iron appts                  Good Morning Dr. Castellon,    Your patient has a fuv with you on 11/4/24. I just wanted to let you know she no showed (on 10/31, 11/4, and 11/8) for all of her IV Iron appts with us so she has not received any IV iron from our Infusion Center.     Please have her reach out to us if she would like to reschedule at [phone].    Thank you!  ELSA Shane  Certified Nurse Practitioner   [facility]   [facility]  [address] [place] [id number]  Phone: [phone]  Fax: [phone]  Email: [email or web address]  [email or web address]

## 2024-11-08 NOTE — TELEPHONE ENCOUNTER
----- Message from Kandice Castellon sent at 11/8/2024  8:18 AM EST -----  Regarding: FW: no showed for her iv iron appts  Can you touch base with her and see if she wants to reschedule or find a different location?  ----- Message -----  From: ELAS Mendosa  Sent: 11/8/2024   7:53 AM EST  To: Kandice Castellon MD; #  Subject: no showed for her iv iron appts                  Good Morning Dr. Castellon,    Your patient has a fuv with you on 11/4/24. I just wanted to let you know she no showed (on 10/31, 11/4, and 11/8) for all of her IV Iron appts with us so she has not received any IV iron from our Infusion Center.     Please have her reach out to us if she would like to reschedule at [phone].    Thank you!  ELSA Shane  Certified Nurse Practitioner   [facility]   Outpatient [facility]  [address] [place] [id number]  Phone: [phone]  Fax: [phone]  Email: [email or web address]  [email or web address]

## 2024-11-14 ENCOUNTER — APPOINTMENT (OUTPATIENT)
Dept: OBSTETRICS AND GYNECOLOGY | Facility: CLINIC | Age: 34
End: 2024-11-14
Payer: COMMERCIAL

## 2024-11-14 VITALS — BODY MASS INDEX: 44.3 KG/M2 | DIASTOLIC BLOOD PRESSURE: 70 MMHG | WEIGHT: 293 LBS | SYSTOLIC BLOOD PRESSURE: 120 MMHG

## 2024-11-14 DIAGNOSIS — I47.10 PAROXYSMAL SUPRAVENTRICULAR TACHYCARDIA (CMS-HCC): ICD-10-CM

## 2024-11-14 DIAGNOSIS — O99.842 BARIATRIC SURGERY STATUS COMPLICATING PREGNANCY, SECOND TRIMESTER (HHS-HCC): ICD-10-CM

## 2024-11-14 DIAGNOSIS — Z78.9 CONCEIVED BY IN VITRO FERTILIZATION: ICD-10-CM

## 2024-11-14 DIAGNOSIS — O99.019: Primary | ICD-10-CM

## 2024-11-14 DIAGNOSIS — F41.9 ANXIETY IN PREGNANCY, ANTEPARTUM (HHS-HCC): ICD-10-CM

## 2024-11-14 DIAGNOSIS — O99.210 OBESITY IN PREGNANCY (HHS-HCC): ICD-10-CM

## 2024-11-14 DIAGNOSIS — Z3A.31 31 WEEKS GESTATION OF PREGNANCY (HHS-HCC): ICD-10-CM

## 2024-11-14 DIAGNOSIS — A60.00 GENITAL HERPES SIMPLEX, UNSPECIFIED SITE: ICD-10-CM

## 2024-11-14 DIAGNOSIS — G47.33 OSA (OBSTRUCTIVE SLEEP APNEA): ICD-10-CM

## 2024-11-14 DIAGNOSIS — O99.340 ANXIETY IN PREGNANCY, ANTEPARTUM (HHS-HCC): ICD-10-CM

## 2024-11-14 PROCEDURE — 0501F PRENATAL FLOW SHEET: CPT | Performed by: OBSTETRICS & GYNECOLOGY

## 2024-11-14 NOTE — PROGRESS NOTES
"Ob Follow-up  24     SUBJECTIVE      HPI: Nicky Herbert \"Yasmeen\" is a 34 y.o.  at 31w5d here for RPNV.  She has no contractions, bleeding, or LOF. Reports normal fetal movement. Patient reports feeling well       OBJECTIVE  Visit Vitals  /70   Wt 139 kg (306 lb)   LMP 2024 (Approximate)   BMI 44.30 kg/m²   OB Status Pregnant   Smoking Status Former   BSA 2.61 m²            ASSESSMENT & PLAN    Nicky Herbert \"Yasmeen\" is a 34 y.o.  at 31w5d here for the following concerns we addressed today:    Problem List Items Addressed This Visit       31 weeks gestation of pregnancy ([facility])    Overview     Desired provider in labor: [] CNM  [x] Physician  [] Blood Products: [] Yes, accepts [] No, needs counseling  [x] Initial BMI: 45  [x] Prenatal Labs: Received records from [facility], scanned to chart prenatal labs reviewed (hgb 10.7, ferritin 5, HIV NR, Hep B NR, Hep C NR, Rubella immune, RPR neg, Gc/chlamydia neg, hgb A1c 5.2, O+  [x] Cervical Cancer Screening up to date, neg co-testing 2023  [x] Rh status: O+ on chart review  [x] Genetic Screening:  cf DNA \"No call\" at 10-11 weeks gestation, re drawn at [facility] and rr cfDNA. discussed this result and option for genetic counseling/diagnostic testing and she declines  [x] NT US: (11-13 wks) Normal  [x] Baby ASA (if indicated): Indicated and taking  [x] Pregnancy dated by: day 5 ET    [x] Anatomy US: (19-20 wks): completed and normal  [] Federal Sterilization consent signed (if indicated):  [x] 1hr GCT at 24-28wks: normal, 117  [x] Rhogam (if indicated): O+ on chart review, not indicated  [] Fetal Surveillance (if indicated):  [x] Tdap (27-32 wks, may be given up to 36 wks if initial window missed): Given 10/31  [] RSV (32-36 wks) (Sept. to end of ): Discussed and will consider  [x] Flu Vaccine: Given 24    [x] Breastfeeding: Plans to bf and pump, plans to take class, did not bf daughter  [x] Postpartum Birth control method: Declines, IVF " pregnancy  [] GBS at 36 - 37 wks:  [] 39 weeks discussion of IOL vs. Expectant management:  [] Mode of delivery ( anticipated ):           Anemia affecting second pregnancy ([facility]) - Primary    Overview     Pt reports 3 IV iron transfusions earlier this pregnancy, Hgb 9.9 on 10/3/24, ferritin 11  IV iron transfusions ordered, did not go, needs to reschedule         Anxiety in pregnancy, antepartum ([facility])    Overview     Well controlled on effexor         Bariatric surgery status complicating pregnancy, second trimester ([facility])    Overview     - low vitamin D, started supplement, will repeat next visit  - iron deficiency anemia, IV infusions scheduled  - Recommend serial growth ultrasounds in third trimester             Conceived by in vitro fertilization    Overview     - normal fetal echo  - Recommend growth ultrasounds at 30 and 36 weeks   - weekly  surveillance from 36 weeks to delivery  - Delivery at 39-40 weeks or sooner as indicated           Genital herpes    Overview     Already on suppression with valtrex, rare outbreaks   is aware  PLEASE DO NOT DISCUSS AROUND OTHER FAMILY DURING L&D ADMISSION FOR LABOR         Obesity in pregnancy ([facility])    Overview     - Recommend serial growth at 30 and 36 weeks  -  surveillance weekly starting at 34 weeks (NST or BPP)  - delivery 39 weeks or sooner if indicated           VIK (obstructive sleep apnea)    Overview     Dx with mild VIK in 2019 prior to bariatric surgery.   Never on treatment, symptoms improved after surgery         Paroxysmal supraventricular tachycardia ([facility])    Overview     Last Assessment & Plan: Formatting of this note might be different from the original. Had ablation surgery when she was 13 yrs Stable currently and denies any issues Marlon Cardenas, CHEYANNE-CNP            Borderline growth at 14%araceli discussed  Breech presentation discussed    RTC in 2 weeks      Kandice Castellon MD

## 2024-11-26 ENCOUNTER — APPOINTMENT (OUTPATIENT)
Dept: OBSTETRICS AND GYNECOLOGY | Facility: CLINIC | Age: 34
End: 2024-11-26
Payer: COMMERCIAL

## 2024-11-26 VITALS — SYSTOLIC BLOOD PRESSURE: 120 MMHG | WEIGHT: 293 LBS | BODY MASS INDEX: 43.44 KG/M2 | DIASTOLIC BLOOD PRESSURE: 70 MMHG

## 2024-11-26 DIAGNOSIS — Z3A.33 33 WEEKS GESTATION OF PREGNANCY (HHS-HCC): ICD-10-CM

## 2024-11-26 DIAGNOSIS — O99.210 OBESITY IN PREGNANCY (HHS-HCC): Primary | ICD-10-CM

## 2024-11-26 DIAGNOSIS — O99.842 BARIATRIC SURGERY STATUS COMPLICATING PREGNANCY, SECOND TRIMESTER (HHS-HCC): ICD-10-CM

## 2024-11-26 DIAGNOSIS — O99.019: ICD-10-CM

## 2024-11-26 PROCEDURE — 59025 FETAL NON-STRESS TEST: CPT | Performed by: OBSTETRICS & GYNECOLOGY

## 2024-11-26 PROCEDURE — 0501F PRENATAL FLOW SHEET: CPT | Performed by: OBSTETRICS & GYNECOLOGY

## 2024-11-26 NOTE — ASSESSMENT & PLAN NOTE
- Next growth scheduled 12/5  - Weekly testing starting at 34 weeks - can schedule NST with next visit at 35 weeks given US next week

## 2024-11-26 NOTE — PROCEDURES
Yasmeen AvendañoPiedad, a  at 33w3d with an FLACO of 2025, by Embryo Transfer, was seen at [facility] for a nonstress test.    Non-Stress Test   Baseline Fetal Heart Rate for Non-Stress Test: 130 BPM  Variability in Waveform for Non-Stress Test: Moderate  Accelerations in Non-Stress Test: Yes, greater than/equal to 15 bpm, lasting at least 15 seconds  Decelerations in Non-Stress Test: None  Contractions in Non-Stress Test: Not present  Interpretation of Non-Stress Test   Interpretation of Non-Stress Test: Reactive         Sima Bravo MD

## 2024-11-26 NOTE — PROGRESS NOTES
"KADE Herbert \"Yasmeen\" is a 34 y.o.  at 33w3d with an estimated date of delivery of 2025, by Embryo Transfer who presents for a routine prenatal visit.    She denies loss of fluid, vaginal bleeding, regular contractions/cramping, and decreased fetal movement. Continues to have nausea and vomiting but able to keep herself hydrated and eat appropriately.     OBJECTIVE  Vitals:    24 0818   BP: 120/70   Weight: 136 kg (300 lb)          ASSESSMENT & PLAN    Obesity in pregnancy ([facility])  - Next growth scheduled   - Weekly testing starting at 34 weeks - can schedule NST with next visit at 35 weeks given US next week    Bariatric surgery status complicating pregnancy, second trimester ([facility])  - Next growth scheduled     Anemia affecting second pregnancy ([facility])  - Encouraged to reschedule IV iron appointment - has this set up in [place]    33 weeks gestation of pregnancy ([facility])  - RSV vaccine offered today - declined    Follow up in 2 weeks for return OB visit.    Sima Bravo MD  Obstetrics & Gynecology  24   "

## 2024-12-05 ENCOUNTER — HOSPITAL ENCOUNTER (OUTPATIENT)
Dept: RADIOLOGY | Facility: CLINIC | Age: 34
Discharge: HOME | End: 2024-12-05
Payer: COMMERCIAL

## 2024-12-05 DIAGNOSIS — Z34.90 ENCOUNTER FOR SUPERVISION OF NORMAL PREGNANCY, UNSPECIFIED, UNSPECIFIED TRIMESTER: ICD-10-CM

## 2024-12-05 PROCEDURE — 76816 OB US FOLLOW-UP PER FETUS: CPT

## 2024-12-05 PROCEDURE — 76819 FETAL BIOPHYS PROFIL W/O NST: CPT

## 2024-12-09 ENCOUNTER — APPOINTMENT (OUTPATIENT)
Dept: OBSTETRICS AND GYNECOLOGY | Facility: CLINIC | Age: 34
End: 2024-12-09
Payer: COMMERCIAL

## 2024-12-09 VITALS — DIASTOLIC BLOOD PRESSURE: 70 MMHG | WEIGHT: 293 LBS | BODY MASS INDEX: 43.87 KG/M2 | SYSTOLIC BLOOD PRESSURE: 118 MMHG

## 2024-12-09 DIAGNOSIS — Z3A.35 35 WEEKS GESTATION OF PREGNANCY (HHS-HCC): ICD-10-CM

## 2024-12-09 DIAGNOSIS — A60.00 GENITAL HERPES SIMPLEX, UNSPECIFIED SITE: ICD-10-CM

## 2024-12-09 DIAGNOSIS — O99.210 OBESITY IN PREGNANCY (HHS-HCC): ICD-10-CM

## 2024-12-09 DIAGNOSIS — I47.10 PAROXYSMAL SUPRAVENTRICULAR TACHYCARDIA (CMS-HCC): Primary | ICD-10-CM

## 2024-12-09 DIAGNOSIS — O99.842 BARIATRIC SURGERY STATUS COMPLICATING PREGNANCY, SECOND TRIMESTER (HHS-HCC): ICD-10-CM

## 2024-12-09 DIAGNOSIS — F41.9 ANXIETY IN PREGNANCY, ANTEPARTUM (HHS-HCC): ICD-10-CM

## 2024-12-09 DIAGNOSIS — O99.340 ANXIETY IN PREGNANCY, ANTEPARTUM (HHS-HCC): ICD-10-CM

## 2024-12-09 DIAGNOSIS — Z78.9 CONCEIVED BY IN VITRO FERTILIZATION: ICD-10-CM

## 2024-12-09 DIAGNOSIS — O99.019: ICD-10-CM

## 2024-12-09 DIAGNOSIS — G47.33 OSA (OBSTRUCTIVE SLEEP APNEA): ICD-10-CM

## 2024-12-09 PROCEDURE — 0501F PRENATAL FLOW SHEET: CPT | Performed by: OBSTETRICS & GYNECOLOGY

## 2024-12-09 NOTE — PROGRESS NOTES
"Ob Follow-up  24     SUBJECTIVE    HPI: Nicky Herbert \"Yasmeen\" is a 34 y.o.  at 35w2d here for RPNV.  She has irregular contractions, no bleeding, or LOF. Reports normal fetal movement. Feels like baby is very low.     OBJECTIVE  Visit Vitals  /70   Wt 137 kg (303 lb)   LMP 2024 (Approximate)   BMI 43.87 kg/m²   OB Status Pregnant   Smoking Status Former   BSA 2.6 m²            ASSESSMENT & PLAN    Nicky Herbert \"Yasmeen\" is a 34 y.o.  at 35w2d here for the following concerns we addressed today:    Problem List Items Addressed This Visit       35 weeks gestation of pregnancy ([facility])    Overview     Desired provider in labor: [] CNM  [x] Physician  [x] Blood Products: [x] Yes, accepts [] No, needs counseling  [x] Initial BMI: 45  [x] Prenatal Labs: [place] [facility] to chart prenatal labs reviewed (hgb 10.7, ferritin 5, HIV NR, Hep B NR, Hep C NR, Rubella immune, RPR neg, Gc/chlamydia neg, hgb A1c 5.2, O+  [x] Cervical Cancer Screening up to date, neg co-testing 2023  [x] Rh status: O+ on chart review  [x] Genetic Screening:  cf DNA \"No call\" at 10-11 weeks gestation, re drawn at [facility] and rr cfDNA. discussed this result and option for genetic counseling/diagnostic testing and she declines  [x] NT US: (11-13 wks) Normal  [x] Baby ASA (if indicated): Indicated and taking  [x] Pregnancy dated by: day 5 ET    [x] Anatomy US: (19-20 wks): completed and normal  [x] Federal Sterilization consent signed (if indicated): No, infertility   [x] 1hr GCT at 24-28wks: normal, 117  [x] Rhogam (if indicated): O+ on chart review, not indicated  [x] Fetal Surveillance (if indicated): Weekly at 34  [x] Tdap (27-32 wks, may be given up to 36 wks if initial window missed): Given 10/31  [x] RSV (32-36 wks) (Sept. to end of ): Declined  [x] Flu Vaccine: Given 24    [x] Breastfeeding: Plans to bf and pump, plans to take class, did not bf daughter  [x] Postpartum Birth control " method: Declines, IVF pregnancy  [] GBS at 36 - 37 wks: Discussed for NV  [] 39 weeks discussion of IOL vs. Expectant management: Discussed . Desires 39 week IOL, consider PM of 17 when I am on call   [x] Mode of delivery ( anticipated ):            Anemia affecting second pregnancy ([facility])    Overview     Pt reports 3 IV iron transfusions earlier this pregnancy, Hgb 9.9 on 10/3/24, ferritin 11  IV iron transfusions ordered, did not go, needs to reschedule  Did not reschedule, will have her go for labs today and work on rescheduled iron transfusion         Anxiety in pregnancy, antepartum ([facility])    Overview     Well controlled on effexor         Bariatric surgery status complicating pregnancy, second trimester ([facility])    Overview     - low vitamin D, started supplement, will repeat next visit  - iron deficiency anemia, IV infusions scheduled  - Recommend serial growth ultrasounds in third trimester             Conceived by in vitro fertilization    Overview     - normal fetal echo  - Recommend growth ultrasounds at 30 and 36 weeks   - weekly  surveillance from 36 weeks to delivery  - Delivery at 39-40 weeks or sooner as indicated           Genital herpes    Overview     Already on suppression with valtrex, rare outbreaks   is aware  PLEASE DO NOT DISCUSS AROUND OTHER FAMILY DURING L&D ADMISSION FOR LABOR         Obesity in pregnancy ([facility])    Overview     - Recommend serial growth at 30 and 36 weeks  -  surveillance weekly starting at 34 weeks (NST or BPP)  - delivery 39 weeks or sooner if indicated           VIK (obstructive sleep apnea)    Overview     Dx with mild VIK in 2019 prior to bariatric surgery.   Never on treatment, symptoms improved after surgery         Paroxysmal supraventricular tachycardia (CMS-HCC) - Primary    Overview     Had ablation surgery when she was 13 yrs             RTC in 1 week      Kandice Castellon MD

## 2024-12-09 NOTE — PROCEDURES
Yasmeen Herbert, a  at 35w2d with an FLACO of 2025, by Embryo Transfer, was seen at [facility] for a nonstress test.    Non-Stress Test   Baseline Fetal Heart Rate for Non-Stress Test: 150 BPM  Variability in Waveform for Non-Stress Test: Moderate  Accelerations in Non-Stress Test: No  Decelerations in Non-Stress Test: None  Contractions in Non-Stress Test: Irregular  Interpretation of Non-Stress Test   Interpretation of Non-Stress Test: Reactive

## 2024-12-12 ENCOUNTER — LAB (OUTPATIENT)
Dept: LAB | Facility: LAB | Age: 34
End: 2024-12-12
Payer: COMMERCIAL

## 2024-12-12 DIAGNOSIS — O99.019: ICD-10-CM

## 2024-12-12 DIAGNOSIS — O99.842 BARIATRIC SURGERY STATUS COMPLICATING PREGNANCY, SECOND TRIMESTER (HHS-HCC): ICD-10-CM

## 2024-12-12 LAB
25(OH)D3 SERPL-MCNC: 11 NG/ML (ref 30–100)
ERYTHROCYTE [DISTWIDTH] IN BLOOD BY AUTOMATED COUNT: 18.4 % (ref 11.5–14.5)
HCT VFR BLD AUTO: 31 % (ref 36–46)
HGB BLD-MCNC: 8.7 G/DL (ref 12–16)
MCH RBC QN AUTO: 19.8 PG (ref 26–34)
MCHC RBC AUTO-ENTMCNC: 28.1 G/DL (ref 32–36)
MCV RBC AUTO: 71 FL (ref 80–100)
NRBC BLD-RTO: 0.6 /100 WBCS (ref 0–0)
PLATELET # BLD AUTO: 426 X10*3/UL (ref 150–450)
RBC # BLD AUTO: 4.4 X10*6/UL (ref 4–5.2)
WBC # BLD AUTO: 10.8 X10*3/UL (ref 4.4–11.3)

## 2024-12-12 PROCEDURE — 83540 ASSAY OF IRON: CPT

## 2024-12-12 PROCEDURE — 82728 ASSAY OF FERRITIN: CPT

## 2024-12-12 PROCEDURE — 83550 IRON BINDING TEST: CPT

## 2024-12-12 PROCEDURE — 36415 COLL VENOUS BLD VENIPUNCTURE: CPT

## 2024-12-12 PROCEDURE — 86695 HERPES SIMPLEX TYPE 1 TEST: CPT

## 2024-12-12 PROCEDURE — 86696 HERPES SIMPLEX TYPE 2 TEST: CPT

## 2024-12-12 PROCEDURE — 82306 VITAMIN D 25 HYDROXY: CPT

## 2024-12-12 PROCEDURE — 85027 COMPLETE CBC AUTOMATED: CPT

## 2024-12-13 LAB
FERRITIN SERPL-MCNC: 8 NG/ML (ref 8–150)
IRON SATN MFR SERPL: ABNORMAL %
IRON SERPL-MCNC: 21 UG/DL (ref 35–150)
TIBC SERPL-MCNC: ABNORMAL UG/DL
UIBC SERPL-MCNC: >450 UG/DL (ref 110–370)

## 2024-12-19 ENCOUNTER — APPOINTMENT (OUTPATIENT)
Dept: INFUSION THERAPY | Facility: CLINIC | Age: 34
End: 2024-12-19
Payer: COMMERCIAL

## 2024-12-19 ENCOUNTER — APPOINTMENT (OUTPATIENT)
Dept: OBSTETRICS AND GYNECOLOGY | Facility: CLINIC | Age: 34
End: 2024-12-19
Payer: COMMERCIAL

## 2024-12-19 VITALS — WEIGHT: 293 LBS | BODY MASS INDEX: 43.81 KG/M2 | SYSTOLIC BLOOD PRESSURE: 112 MMHG | DIASTOLIC BLOOD PRESSURE: 80 MMHG

## 2024-12-19 VITALS
HEART RATE: 91 BPM | SYSTOLIC BLOOD PRESSURE: 111 MMHG | OXYGEN SATURATION: 99 % | DIASTOLIC BLOOD PRESSURE: 73 MMHG | TEMPERATURE: 97.2 F | RESPIRATION RATE: 16 BRPM

## 2024-12-19 DIAGNOSIS — O99.019: ICD-10-CM

## 2024-12-19 DIAGNOSIS — I47.10 PAROXYSMAL SUPRAVENTRICULAR TACHYCARDIA (CMS-HCC): ICD-10-CM

## 2024-12-19 DIAGNOSIS — Z78.9 CONCEIVED BY IN VITRO FERTILIZATION: ICD-10-CM

## 2024-12-19 DIAGNOSIS — G47.33 OSA (OBSTRUCTIVE SLEEP APNEA): ICD-10-CM

## 2024-12-19 DIAGNOSIS — O99.842 BARIATRIC SURGERY STATUS COMPLICATING PREGNANCY, SECOND TRIMESTER (HHS-HCC): ICD-10-CM

## 2024-12-19 DIAGNOSIS — F41.9 ANXIETY IN PREGNANCY, ANTEPARTUM (HHS-HCC): ICD-10-CM

## 2024-12-19 DIAGNOSIS — O99.340 ANXIETY IN PREGNANCY, ANTEPARTUM (HHS-HCC): ICD-10-CM

## 2024-12-19 DIAGNOSIS — O09.93 SUPERVISION OF HIGH RISK PREGNANCY IN THIRD TRIMESTER (HHS-HCC): Primary | ICD-10-CM

## 2024-12-19 DIAGNOSIS — A60.00 GENITAL HERPES SIMPLEX, UNSPECIFIED SITE: ICD-10-CM

## 2024-12-19 DIAGNOSIS — O99.210 OBESITY IN PREGNANCY (HHS-HCC): ICD-10-CM

## 2024-12-19 DIAGNOSIS — Z3A.36 36 WEEKS GESTATION OF PREGNANCY (HHS-HCC): ICD-10-CM

## 2024-12-19 LAB
HERPES SIMPLEX VIRUS 1 IGG: <0.2 INDEX
HERPES SIMPLEX VIRUS 2 IGG: >8 INDEX

## 2024-12-19 PROCEDURE — 59025 FETAL NON-STRESS TEST: CPT | Performed by: OBSTETRICS & GYNECOLOGY

## 2024-12-19 PROCEDURE — 87081 CULTURE SCREEN ONLY: CPT

## 2024-12-19 PROCEDURE — 0501F PRENATAL FLOW SHEET: CPT | Performed by: OBSTETRICS & GYNECOLOGY

## 2024-12-19 RX ORDER — ALBUTEROL SULFATE 0.83 MG/ML
3 SOLUTION RESPIRATORY (INHALATION) AS NEEDED
OUTPATIENT
Start: 2024-12-23

## 2024-12-19 RX ORDER — DIPHENHYDRAMINE HYDROCHLORIDE 50 MG/ML
50 INJECTION INTRAMUSCULAR; INTRAVENOUS AS NEEDED
OUTPATIENT
Start: 2024-12-23

## 2024-12-19 RX ORDER — EPINEPHRINE 0.3 MG/.3ML
0.3 INJECTION SUBCUTANEOUS EVERY 5 MIN PRN
OUTPATIENT
Start: 2024-12-23

## 2024-12-19 RX ORDER — FAMOTIDINE 10 MG/ML
20 INJECTION INTRAVENOUS ONCE AS NEEDED
OUTPATIENT
Start: 2024-12-23

## 2024-12-19 ASSESSMENT — ENCOUNTER SYMPTOMS
APPETITE CHANGE: 0
BLOOD IN STOOL: 0
NAUSEA: 0
LIGHT-HEADEDNESS: 0
HEADACHES: 0
EYE PROBLEMS: 0
WHEEZING: 0
TROUBLE SWALLOWING: 0
EXTREMITY WEAKNESS: 0
FATIGUE: 1
DIZZINESS: 0
VOICE CHANGE: 0
NUMBNESS: 1
ABDOMINAL PAIN: 0
HEMATURIA: 0
FREQUENCY: 0
MYALGIAS: 0
LEG SWELLING: 1
FEVER: 0
CHILLS: 0
DYSURIA: 0
CONSTIPATION: 0
DIARRHEA: 0
ARTHRALGIAS: 0
PALPITATIONS: 0
SHORTNESS OF BREATH: 0
SORE THROAT: 0
VOMITING: 0
UNEXPECTED WEIGHT CHANGE: 0
WOUND: 0
COUGH: 0

## 2024-12-19 NOTE — PROCEDURES
Yasmeen Herbert, a  at 36w5d with an FLACO of 2025, by Embryo Transfer, was seen at [facility] for a nonstress test.    Non-Stress Test   Baseline Fetal Heart Rate for Non-Stress Test: 140 BPM  Variability in Waveform for Non-Stress Test: Moderate  Accelerations in Non-Stress Test: Yes  Decelerations in Non-Stress Test: None  Contractions in Non-Stress Test: Not present  Interpretation of Non-Stress Test   Interpretation of Non-Stress Test: Reactive

## 2024-12-19 NOTE — PROGRESS NOTES
[facility]   Infusion Clinic Note   Date: 2024   Name: Nicky Herbert  : 1990   MRN: [id number]          Reason for Visit: OP Infusion (VENOFER 300 MG IV INFUSION. DOSE 1 OF 3. )         Today: We administered iron sucrose (Venofer) 300 mg in sodium chloride 0.9% 265 mL IV.       Visit Type: INFUSION       Ordered By: Kandice Castellon MD         Accompanied by:Self       Diagnosis: Anemia affecting second pregnancy ([facility])    Bariatric surgery status complicating pregnancy, second trimester ([facility])        Allergies:   Allergies as of 2024    (No Known Allergies)          Current Medications has a current medication list which includes the following prescription(s): aspirin, ergocalciferol, omeprazole, valacyclovir, and venlafaxine.       Vitals:   Vitals:    24 0708 24 1123 24 1331   BP: 125/83 127/84 111/73   Pulse: (!) 123 106 91   Resp: 17 16 16   Temp: 36.7 °C (98.1 °F) 36.2 °C (97.2 °F)    SpO2: 96% 99% 99%   LMP: 2024             Infusion Pre-procedure Checklist:   - Allergies reviewed: yes   - Medications reviewed: yes       - Previous reaction to current treatment: no      Assess patient for the concerns below. Document provider notification as appropriate.  - Active or recent infection with/without current antibiotic use: no  - Recent or planned invasive dental work: no  - Recent or planned surgeries: no  - Recently received or plans to receive vaccinations: no  - Has treatment related toxicities: no  - Is pregnant:  yes 36 WEEKS      Pain: 0   - Is the pain different from normal: n/a   - Is your Doctor aware:  n/a       Labs:  REVIEWED          Fall Risk Screening: Ashley Fall Risk  History of Falling, Immediate or Within 3 Months: No  Secondary Diagnosis: No  Ambulatory Aid: Walks without aid/bedrest/nurse assist  Intravenous Therapy/Heparin Lock: Yes  Gait/Transferring: Normal/bedrest/immobile  Mental Status: Oriented  to own ability  Ramirez Fall Risk Score: 20       Review Of Systems:  Review of Systems   Constitutional:  Positive for fatigue. Negative for appetite change, chills, fever and unexpected weight change.   HENT:   Negative for hearing loss, mouth sores, sore throat, tinnitus, trouble swallowing and voice change.    Eyes:  Negative for eye problems.   Respiratory:  Negative for cough, shortness of breath and wheezing.    Cardiovascular:  Positive for leg swelling. Negative for chest pain and palpitations.   Gastrointestinal:  Negative for abdominal pain, blood in stool, constipation, diarrhea, nausea and vomiting.   Genitourinary:  Negative for dysuria, frequency and hematuria.    Musculoskeletal:  Negative for arthralgias and myalgias.   Skin:  Negative for itching, rash and wound.   Neurological:  Positive for numbness. Negative for dizziness, extremity weakness, headaches and light-headedness.        R/T CARPAL TUNNEL BLT WRISTS.         ROS completed? Yes      Infusion Readiness:  - Assessment Concerns Related to Infusion: No  - Provider notified: no      Document Below Only If Indicated:   New Patient Education:    NEW PATIENT MEDICATION EDUCATION PT PROVIDED WITH WRITTEN ([employer] PT EDUCATION SHEET) AND VERBAL EDUCATION REGARDING MEDICATION GIVEN. VERIFIED MEDICATION NAME WITH PATIENT AND DISCUSSED REASON FOR USE. BRIEFLY DISCUSSED HOW MEDICATION WORKS AND EDUCATED ON GOAL OF TREATMENT, FREQUENCY OF TREATMENT, ADVERSE RXN'S AND COMMON SIDE EFFECTS TO MONITOR FOR. INSTRUCTED PT TO ASSURE THAT ALL PROVIDERS INCLUDING DENTISTS ARE AWARE OF MEDICATION RECEIVED. DISCUSSED FLOW OF VISIT AND ORIENTED TO INFUSION CENTER. PT VERBALIZES UNDERSTANDING. CALL LIGHT PROVIDED AND PT AWARE TO ALERT STAFF OF ANY CONCERNS DURING TREATMENT.        Treatment Conditions & Drug Specific Questions:    Iron Sucrose (VENOFER)     (Unless otherwise specified on patient specific therapy plan):    REMINDERS:  May cause transient  hypotension. Supine position recommended for infusion.   Pregnancy test prior to first dose for patients of childbearing age.    Recommended Vitals/Observation:  Vitals: Obtain vital signs before and after each infusion.  Observation: 30 minutes after the infusion is complete. Observation complete.      Lab Results   Component Value Date    IRON 21 (L) 12/12/2024    TIBC  12/12/2024      Comment:      One or more of the analytes used in this calculation is outside of the analytical measurement range.      FERRITIN 8 12/12/2024     Lab Results   Component Value Date    IRONSAT  12/12/2024      Comment:      One or more analytes used in this calculation is outside of the analytical measurement range. Calculation cannot be performed.    IRONSATP  10/03/2024      Comment:      % SATURATION-PREGNANCY    Non-pregnancy      25 -  45 %  First Trimester     7 -  57 %  Second Trimester   10 -  44 %  Third Trimester     5 -  37 %    Please note results will not flag based on reference ranges above.  One or more analytes used in this calculation is outside of the analytical measurement range. Calculation cannot be performed.      Lab Results   Component Value Date    WBC 10.8 12/12/2024    HGB 8.7 (L) 12/12/2024    HCT 31.0 (L) 12/12/2024    MCV 71 (L) 12/12/2024     12/12/2024            Weight Based Drug Calculations:    WEIGHT BASED DRUGS: NOT APPLICABLE / FLAT DOSE          Initiated By: Odalis Moses RN      All questions and concerns were addressed at time of visit by nursing staff. Patient was not independently evaluated by the Nurse Practitioner on site at [facility].    12/19/24 at 2:33 PM - CHEYANNE Salomon-CNP

## 2024-12-19 NOTE — PATIENT INSTRUCTIONS
Thank you for choosing [facility] and letting us take care of you today!    NEXT APPOINTMENT NEEDED FOR PATIENT: 12/23/2024 at 0700    If you need to call us for any reason: Cancel, Schedule, or Reschedule please call us at [phone].     Our hours of operation are **Starting July 15--our hours are changing to Monday through Friday 7am-7pm, and Saturdays from 7am-330pm**. We are still closed on Sundays and Holidays**    Please call us if you are sick, have surgery, dental work, or your physician wants your visit rescheduled.     Any insurance changes will need approval from your new insurance and this can take up to two weeks.     If you call the center and no one answers please leave us a voicemail and someone from our office with get back to you within 24 hours. If you call on a weekend or a holiday we will return your call back on the next open business day.    Go to the Emergency Department or call 911 if you experience the following:    -You have signs of an allergic reaction  -Rash, hives, itching  -Swollen, blistered, peeling skin  -Swelling of face, lips, mouth, tongue or throat  -Tightness of chest, trouble breathing, swallowing, or talking    Call your doctor:     -If IV/Injection site gets warm, swollen, itchy or leaks fluid or pus  (Leave dressing on your IV site for at least 2 hours and keep the area clean and dry    -If you get sick or have symptoms of infection or are not feeling well for any reason    -If you have any side effects from your medication that do not go away or are bothersome.    -Some side effects may include: Stuffy nose, headache, feeling tired, muscle aches, and upset stomach.

## 2024-12-19 NOTE — PROGRESS NOTES
"Ob Follow-up  24     SUBJECTIVE      HPI: Nicky Herbert \"Yasmeen\" is a 34 y.o.  at 36w5d here for RPNV.  She has no contractions, bleeding, or LOF. Reports normal fetal movement.     Patient disclosed today that she had a herpes outbreak in November. Her symptoms were resolved on . She had this outbreak while on her normal suppression. She is asymptomatic today.     OBJECTIVE  Visit Vitals  /80   Wt 137 kg (302 lb 9.6 oz)   LMP 2024 (Approximate)   BMI 43.81 kg/m²   OB Status Pregnant   Smoking Status Former   BSA 2.6 m²          ASSESSMENT & PLAN    Nicky Herbert \"Yasmeen\" is a 34 y.o.  at 36w5d here for the following concerns we addressed today:    Problem List Items Addressed This Visit       36 weeks gestation of pregnancy ([facility])    Overview     Desired provider in labor: [] CNM  [x] Physician  [x] Blood Products: [x] Yes, accepts [] No, needs counseling  [x] Initial BMI: 45  [x] Prenatal Labs: [place] [facility] [place] prenatal labs reviewed (hgb 10.7, ferritin 5, HIV NR, Hep B NR, Hep C NR, Rubella immune, RPR neg, Gc/chlamydia neg, hgb A1c 5.2, O+  [x] Cervical Cancer Screening up to date, neg co-testing 2023  [x] Rh status: O+ on chart review  [x] Genetic Screening:  cf DNA \"No call\" at 10-11 weeks gestation, [place] [facility] [place] cfDNA. discussed this result and option for genetic counseling/diagnostic testing and she declines  [x] NT US: (11-13 wks) Normal  [x] Baby ASA (if indicated): Indicated and taking  [x] Pregnancy dated by: day 5 ET    [x] Anatomy US: (19-20 wks): completed and normal  [x] Federal Sterilization consent signed (if indicated): No, infertility   [x] 1hr GCT at 24-28wks: normal, 117  [x] Rhogam (if indicated): O+ on chart review, not indicated  [x] Fetal Surveillance (if indicated): Weekly at 34  [x] Tdap (27-32 wks, may be given up to 36 wks if initial window missed): Given 10/31  [x] RSV (32-36 wks) (Sept. to end of " Andi): Declined  [x] Flu Vaccine: Given 24    [x] Breastfeeding: Plans to bf and pump, plans to take class, did not bf daughter  [x] Postpartum Birth control method: Declines, IVF pregnancy  [] GBS at 36 - 37 wks: collected today  [] 39 weeks discussion of IOL vs. Expectant management: Discussed . Desires 39 week IOL, IOL requested  at 39w3d  [x] Mode of delivery ( anticipated ):            Anemia affecting second pregnancy ([facility])    Overview     Pt reports 3 IV iron transfusions earlier this pregnancy, Hgb 9.9 on 10/3/24, ferritin 11  IV iron transfusions ordered, did not go, has rescheduled multiple times  Hgb now 8.7. Scheduled for iron infusion after office visit.   Discussed possible need for blood transfusion after delivery         Anxiety in pregnancy, antepartum ([facility])    Overview     Well controlled on effexor         Bariatric surgery status complicating pregnancy, second trimester ([facility])    Overview     - low vitamin D, started supplement, will repeat next visit  - iron deficiency anemia, IV infusions scheduled  - Recommend serial growth ultrasounds in third trimester             Conceived by in vitro fertilization    Overview     - normal fetal echo  - Recommend growth ultrasounds at 30 and 36 weeks (EFW at 34w5d 25%tile). Repeat scheduled 3-4 weeks  - weekly  surveillance from 36 weeks to delivery  - Delivery at 39-40 weeks or sooner as indicated           Genital herpes    Overview     Already on suppression with valtrex, rare outbreaks   is aware  PLEASE DO NOT DISCUSS AROUND OTHER FAMILY DURING L&D ADMISSION FOR LABOR  Herpes outbreak occurred in 2024. This was not disclosed until December. No lesions present to test. Herpes IgG ordered. Last day of outbreak was .          Relevant Orders    HSV1 IgG and HSV2 IgG    Obesity in pregnancy ([facility])    Overview     - Recommend serial growth at 30 and 36 weeks  -  surveillance weekly starting  at 34 weeks (NST or BPP)  - delivery 39 weeks or sooner if indicated           VIK (obstructive sleep apnea)    Overview     Dx with mild VIK in 2019 prior to bariatric surgery.   Never on treatment, symptoms improved after surgery         Paroxysmal supraventricular tachycardia (CMS-HCC)    Overview     Had ablation surgery when she was 13 yrs           Other Visit Diagnoses       Supervision of high risk pregnancy in third trimester ([facility])    -  Primary    Relevant Orders    Group B Streptococcus (GBS) Prenatal Screen, Culture              RTC in 1 week      Kandice Castellon MD

## 2024-12-22 LAB — GP B STREP GENITAL QL CULT: NORMAL

## 2024-12-23 ENCOUNTER — APPOINTMENT (OUTPATIENT)
Dept: INFUSION THERAPY | Facility: CLINIC | Age: 34
End: 2024-12-23
Payer: COMMERCIAL

## 2024-12-23 VITALS
OXYGEN SATURATION: 99 % | SYSTOLIC BLOOD PRESSURE: 132 MMHG | TEMPERATURE: 97.2 F | HEART RATE: 84 BPM | DIASTOLIC BLOOD PRESSURE: 81 MMHG | RESPIRATION RATE: 20 BRPM

## 2024-12-23 DIAGNOSIS — O99.019: ICD-10-CM

## 2024-12-23 DIAGNOSIS — O99.842 BARIATRIC SURGERY STATUS COMPLICATING PREGNANCY, SECOND TRIMESTER (HHS-HCC): ICD-10-CM

## 2024-12-23 PROCEDURE — 96365 THER/PROPH/DIAG IV INF INIT: CPT | Performed by: REGISTERED NURSE

## 2024-12-23 PROCEDURE — 96366 THER/PROPH/DIAG IV INF ADDON: CPT | Performed by: REGISTERED NURSE

## 2024-12-23 RX ORDER — DIPHENHYDRAMINE HYDROCHLORIDE 50 MG/ML
50 INJECTION INTRAMUSCULAR; INTRAVENOUS AS NEEDED
OUTPATIENT
Start: 2024-12-27

## 2024-12-23 RX ORDER — ALBUTEROL SULFATE 0.83 MG/ML
3 SOLUTION RESPIRATORY (INHALATION) AS NEEDED
OUTPATIENT
Start: 2024-12-27

## 2024-12-23 RX ORDER — FAMOTIDINE 10 MG/ML
20 INJECTION INTRAVENOUS ONCE AS NEEDED
OUTPATIENT
Start: 2024-12-27

## 2024-12-23 RX ORDER — EPINEPHRINE 0.3 MG/.3ML
0.3 INJECTION SUBCUTANEOUS EVERY 5 MIN PRN
OUTPATIENT
Start: 2024-12-27

## 2024-12-23 ASSESSMENT — ENCOUNTER SYMPTOMS
BLOOD IN STOOL: 0
FEVER: 0
SORE THROAT: 0
PALPITATIONS: 0
LIGHT-HEADEDNESS: 0
CONSTIPATION: 0
VOICE CHANGE: 0
NERVOUS/ANXIOUS: 1
HEADACHES: 0
FATIGUE: 1
DIARRHEA: 0
LEG SWELLING: 0
VOMITING: 0
DYSURIA: 0
NUMBNESS: 0
WOUND: 0
NAUSEA: 0
MYALGIAS: 0
EYE PROBLEMS: 0
UNEXPECTED WEIGHT CHANGE: 0
APPETITE CHANGE: 0
HEMATURIA: 0
ABDOMINAL PAIN: 0
DIZZINESS: 0
ARTHRALGIAS: 0
COUGH: 0
TROUBLE SWALLOWING: 0
WHEEZING: 0
FREQUENCY: 0
SHORTNESS OF BREATH: 0

## 2024-12-23 ASSESSMENT — PAIN SCALES - GENERAL: PAINLEVEL_OUTOF10: 0-NO PAIN

## 2024-12-23 NOTE — PROGRESS NOTES
[facility]   Infusion Clinic Note   Date: 2024   Name: Nicky Herbert  : 1990   MRN: [id number]          Reason for Visit: OP Infusion (VENOFER 300 MG)         Today: We administered iron sucrose (Venofer) 300 mg in sodium chloride 0.9% 265 mL IV.       Visit Type: INFUSION       Ordered By: ROLA MCMILLAN MD       Accompanied by: Self       Diagnosis: Anemia affecting second pregnancy ([facility])    Bariatric surgery status complicating pregnancy, second trimester ([facility])        Allergies:   Allergies as of 2024    (No Known Allergies)          Current Medications has a current medication list which includes the following prescription(s): aspirin, ergocalciferol, omeprazole, valacyclovir, and venlafaxine.       Vitals:   Vitals:    24 0705 24 0847   BP: 135/85 132/81   Pulse: 90 84   Resp: 18 20   Temp: 36.2 °C (97.2 °F)    SpO2: 98% 99%   PainSc: 0-No pain    LMP: 2024             Infusion Pre-procedure Checklist:   - Allergies reviewed: yes   - Medications reviewed: yes       - Previous reaction to current treatment: yes      Assess patient for the concerns below. Document provider notification as appropriate.  - Active or recent infection with/without current antibiotic use: NO  - Recent or planned invasive dental work: no  - Recent or planned surgeries: no  - Recently received or plans to receive vaccinations: no  - Has treatment related toxicities: no  - Is pregnant:  YES      Pain: 0   - Is the pain different from normal: n/a   - Is your Doctor aware:  n/a       Labs:  VIEWED          Fall Risk Screening: Ashley Fall Risk  History of Falling, Immediate or Within 3 Months: No  Secondary Diagnosis: No  Intravenous Therapy/Heparin Lock: Yes  Gait/Transferring: Normal/bedrest/immobile       Review Of Systems:  Review of Systems   Constitutional:  Positive for fatigue. Negative for appetite change, fever and unexpected weight change.   HENT:    Negative for hearing loss, mouth sores, sore throat, trouble swallowing and voice change.    Eyes:  Negative for eye problems.   Respiratory:  Negative for cough, shortness of breath and wheezing.    Cardiovascular:  Negative for chest pain, leg swelling and palpitations.   Gastrointestinal:  Negative for abdominal pain, blood in stool, constipation, diarrhea, nausea and vomiting.   Genitourinary:  Negative for dysuria, frequency and hematuria.    Musculoskeletal:  Negative for arthralgias and myalgias.   Skin:  Negative for rash and wound.   Neurological:  Negative for dizziness, headaches, light-headedness and numbness.   Psychiatric/Behavioral:  The patient is nervous/anxious.          ROS completed? Yes      Infusion Readiness:  - Assessment Concerns Related to Infusion: No  - Provider notified: no      Document Below Only If Indicated:   New Patient Education:    N/A (returning patient for continuation of therapy. Ongoing education provided as needed.)        Treatment Conditions & Drug Specific Questions:    Iron Sucrose (VENOFER)     (Unless otherwise specified on patient specific therapy plan):    REMINDERS:  May cause transient hypotension. Supine position recommended for infusion.   Pregnancy test prior to first dose for patients of childbearing age.    Recommended Vitals/Observation:  Vitals: Obtain vital signs before and after each infusion.  Observation: 30 minutes after the infusion is complete. Observation complete.      Lab Results   Component Value Date    IRON 21 (L) 12/12/2024    TIBC  12/12/2024      Comment:      One or more of the analytes used in this calculation is outside of the analytical measurement range.      FERRITIN 8 12/12/2024     Lab Results   Component Value Date    IRONSAT  12/12/2024      Comment:      One or more analytes used in this calculation is outside of the analytical measurement range. Calculation cannot be performed.    IRONSATP  10/03/2024      Comment:      %  SATURATION-PREGNANCY    Non-pregnancy      25 -  45 %  First Trimester     7 -  57 %  Second Trimester   10 -  44 %  Third Trimester     5 -  37 %    Please note results will not flag based on reference ranges above.  One or more analytes used in this calculation is outside of the analytical measurement range. Calculation cannot be performed.      Lab Results   Component Value Date    WBC 10.8 12/12/2024    HGB 8.7 (L) 12/12/2024    HCT 31.0 (L) 12/12/2024    MCV 71 (L) 12/12/2024     12/12/2024            Weight Based Drug Calculations:    WEIGHT BASED DRUGS: NOT APPLICABLE / FLAT DOSE          Initiated By: Caroline Mike RN      All questions and concerns were addressed at time of visit by nursing staff. Patient was not independently evaluated by the Nurse Practitioner on site at [facility].    12/23/24 at 11:57 AM - CHEYANNE Salomon-CNP

## 2024-12-23 NOTE — PATIENT INSTRUCTIONS
Today :We administered iron sucrose (Venofer) 300 mg in sodium chloride 0.9% 265 mL IV.     For:   1. Anemia affecting second pregnancy ([facility])    2. Bariatric surgery status complicating pregnancy, second trimester ([facility])         Your next appointment is due in:  scheduled        Please read the  Medication Guide that was given to you and reviewed during todays visit.     (Tell all doctors including dentists that you are taking this medication)     Go to the emergency room or call 911 if:  -You have signs of allergic reaction:   -Rash, hives, itching.   -Swollen, blistered, peeling skin.   -Swelling of face, lips, mouth, tongue or throat.   -Tightness of chest, trouble breathing, swallowing or talking     Call your doctor:  - If IV / injection site gets red, warm, swollen, itchy or leaks fluid or pus.     (Leave dressing on your IV site for at least 2 hours and keep area clean and dry  - If you get sick or have symptoms of infection or are not feeling well for any reason.    (Wash your hands often, stay away from people who are sick)  - If you have side effects from your medication that do not go away or are bothersome.     (Refer to the teaching your nurse gave you for side effects to call your doctor about)    - Common side effects may include:  stuffy nose, headache, feeling tired, muscle aches, upset stomach  - Before receiving any vaccines     - Call [facility] at   If:  - You get sick, are on antibiotics, have had a recent vaccine, have surgery or dental work and your doctor wants your visit rescheduled.  - You need to cancel and reschedule your visit for any reason. Call at least 2 days before your visit if you need to cancel.   - Your insurance changes before your next visit.    (We will need to get approval from your new insurance. This can take up to two weeks.)     [facility] is opened Monday thru Friday. We are closed on weekends and holidays.   Voice mail  will take your call if the center is closed. If you leave a message please allow 24 hours for a call back during weekdays. If you leave a message on a weekend/holiday, we will call you back the next business day.    A pharmacist is available Monday - Friday from 8:30AM to 3:30PM to help answer any questions you may have about your prescriptions(s). Please call pharmacy at:    [facility]: [phone]  [facility]: [phone]  [facility]: [phone]            Today :We administered iron sucrose (Venofer) 300 mg in sodium chloride 0.9% 265 mL IV.     For:   1. Anemia affecting second pregnancy ([facility])    2. Bariatric surgery status complicating pregnancy, second trimester ([facility])         Your next appointment is due in:  scheduled        Please read the  Medication Guide that was given to you and reviewed during todays visit.     (Tell all doctors including dentists that you are taking this medication)     Go to the emergency room or call 911 if:  -You have signs of allergic reaction:   -Rash, hives, itching.   -Swollen, blistered, peeling skin.   -Swelling of face, lips, mouth, tongue or throat.   -Tightness of chest, trouble breathing, swallowing or talking     Call your doctor:  - If IV / injection site gets red, warm, swollen, itchy or leaks fluid or pus.     (Leave dressing on your IV site for at least 2 hours and keep area clean and dry  - If you get sick or have symptoms of infection or are not feeling well for any reason.    (Wash your hands often, stay away from people who are sick)  - If you have side effects from your medication that do not go away or are bothersome.     (Refer to the teaching your nurse gave you for side effects to call your doctor about)    - Common side effects may include:  stuffy nose, headache, feeling tired, muscle aches, upset stomach  - Before receiving any vaccines     - Call the [facility] at   If:  -  You get sick, are on antibiotics, have had a recent vaccine, have surgery or dental work and your doctor wants your visit rescheduled.  - You need to cancel and reschedule your visit for any reason. Call at least 2 days before your visit if you need to cancel.   - Your insurance changes before your next visit.    (We will need to get approval from your new insurance. This can take up to two weeks.)     [facility] is opened Monday thru Friday. We are closed on weekends and holidays.   Voice mail will take your call if the center is closed. If you leave a message please allow 24 hours for a call back during weekdays. If you leave a message on a weekend/holiday, we will call you back the next business day.    A pharmacist is available Monday - Friday from 8:30AM to 3:30PM to help answer any questions you may have about your prescriptions(s). Please call pharmacy at:    [facility]: [phone]  [facility]: [phone]  [facility]: [phone]

## 2024-12-24 ENCOUNTER — APPOINTMENT (OUTPATIENT)
Dept: OBSTETRICS AND GYNECOLOGY | Facility: CLINIC | Age: 34
End: 2024-12-24
Payer: COMMERCIAL

## 2024-12-24 VITALS — SYSTOLIC BLOOD PRESSURE: 122 MMHG | WEIGHT: 293 LBS | DIASTOLIC BLOOD PRESSURE: 70 MMHG | BODY MASS INDEX: 44.3 KG/M2

## 2024-12-24 DIAGNOSIS — Z78.9 CONCEIVED BY IN VITRO FERTILIZATION: Primary | ICD-10-CM

## 2024-12-24 DIAGNOSIS — I47.10 PAROXYSMAL SUPRAVENTRICULAR TACHYCARDIA (CMS-HCC): ICD-10-CM

## 2024-12-24 DIAGNOSIS — Z3A.37 37 WEEKS GESTATION OF PREGNANCY (HHS-HCC): ICD-10-CM

## 2024-12-24 DIAGNOSIS — O99.340 ANXIETY IN PREGNANCY, ANTEPARTUM (HHS-HCC): ICD-10-CM

## 2024-12-24 DIAGNOSIS — F41.9 ANXIETY IN PREGNANCY, ANTEPARTUM (HHS-HCC): ICD-10-CM

## 2024-12-24 DIAGNOSIS — O99.210 OBESITY IN PREGNANCY (HHS-HCC): ICD-10-CM

## 2024-12-24 DIAGNOSIS — O99.019: ICD-10-CM

## 2024-12-24 DIAGNOSIS — G47.33 OSA (OBSTRUCTIVE SLEEP APNEA): ICD-10-CM

## 2024-12-24 DIAGNOSIS — O99.842 BARIATRIC SURGERY STATUS COMPLICATING PREGNANCY, SECOND TRIMESTER (HHS-HCC): ICD-10-CM

## 2024-12-24 DIAGNOSIS — A60.00 GENITAL HERPES SIMPLEX, UNSPECIFIED SITE: ICD-10-CM

## 2024-12-24 PROCEDURE — 59025 FETAL NON-STRESS TEST: CPT | Performed by: OBSTETRICS & GYNECOLOGY

## 2024-12-24 PROCEDURE — 0501F PRENATAL FLOW SHEET: CPT | Performed by: OBSTETRICS & GYNECOLOGY

## 2024-12-24 NOTE — PROCEDURES
Yasmeen Herbert, a  at 37w3d with an FLACO of 2025, by Embryo Transfer, was seen at [facility] for a nonstress test.    Non-Stress Test   Baseline Fetal Heart Rate for Non-Stress Test: 150 BPM  Variability in Waveform for Non-Stress Test: Moderate  Accelerations in Non-Stress Test: Yes  Decelerations in Non-Stress Test: None  Contractions in Non-Stress Test: Not present  Interpretation of Non-Stress Test   Interpretation of Non-Stress Test: Reactive

## 2024-12-24 NOTE — PROGRESS NOTES
"Ob Follow-up  24     SUBJECTIVE      HPI: Nicky Herbert \"Yasmeen\" is a 34 y.o.  at 37w3d here for RPNV.  She has no contractions, bleeding, or LOF. Reports normal fetal movement. Patient reports no complaints. Had 2 infusions of IV iron.       OBJECTIVE  Visit Vitals  /70   Wt 139 kg (306 lb)   LMP 2024 (Approximate)   BMI 44.30 kg/m²   OB Status Pregnant   Smoking Status Former   BSA 2.61 m²            ASSESSMENT & PLAN    Nicky Herbert \"Yasmeen\" is a 34 y.o.  at 37w3d here for the following concerns we addressed today:    Problem List Items Addressed This Visit       37 weeks gestation of pregnancy ([facility])    Overview     Desired provider in labor: [] CNM  [x] Physician  [x] Blood Products: [x] Yes, accepts [] No, needs counseling  [x] Initial BMI: 45  [x] Prenatal Labs: [place] [facility] prenatal labs reviewed (hgb 10.7, ferritin 5, HIV NR, Hep B NR, Hep C NR, Rubella immune, RPR neg, Gc/chlamydia neg, hgb A1c 5.2, O+  [x] Cervical Cancer Screening up to date, neg co-testing 2023  [x] Rh status: O+ on chart review  [x] Genetic Screening:  cf DNA \"No call\" at 10-11 weeks gestation, [place] [facility] [place] rr cfDNA. discussed this result and option for genetic counseling/diagnostic testing and she declines  [x] NT US: (11-13 wks) Normal  [x] Baby ASA (if indicated): Indicated and taking  [x] Pregnancy dated by: day 5 ET    [x] Anatomy US: (19-20 wks): completed and normal  [x] Federal Sterilization consent signed (if indicated): No, infertility   [x] 1hr GCT at 24-28wks: normal, 117  [x] Rhogam (if indicated): O+ on chart review, not indicated  [x] Fetal Surveillance (if indicated): Weekly at 34  [x] Tdap (27-32 wks, may be given up to 36 wks if initial window missed): Given 10/31  [x] RSV (32-36 wks) (Sept. to end of ): Declined  [x] Flu Vaccine: Given 24    [x] Breastfeeding: Plans to bf and pump, plans to take class, did not bf daughter  [x] " Postpartum Birth control method: Declines, IVF pregnancy  [x] GBS at 36 - 37 wks: negative  [x] 39 weeks discussion of IOL vs. Expectant management: Scheduled  at 39w3d  [x] Mode of delivery ( anticipated ):            Anemia affecting second pregnancy ([facility])    Overview     Pt reports 3 IV iron transfusions earlier this pregnancy, Hgb 9.9 on 10/3/24, ferritin 11  IV iron transfusions ordered, did not go, has rescheduled multiple times  Hgb now 8.7.   Had 2 iron transfusions last week. One more scheduled prior to IOL  Plan for repeat labs at time of IOL         Anxiety in pregnancy, antepartum ([facility])    Overview     Well controlled on effexor         Bariatric surgery status complicating pregnancy, second trimester ([facility])    Overview     - low vitamin D, started supplement, will repeat next visit  - iron deficiency anemia, IV infusions scheduled  - Recommend serial growth ultrasounds in third trimester             Conceived by in vitro fertilization - Primary    Overview     - normal fetal echo  - Recommend growth ultrasounds at 30 and 36 weeks (EFW at 34w5d 25%tile). Repeat scheduled 3-4 weeks  - weekly  surveillance from 36 weeks to delivery  - Delivery at 39-40 weeks or sooner as indicated           Relevant Orders    Labor Induction    Genital herpes    Overview     Already on suppression with valtrex, rare outbreaks   is aware  PLEASE DO NOT DISCUSS AROUND OTHER FAMILY DURING L&D ADMISSION FOR LABOR  Herpes outbreak occurred in 2024. This was not disclosed until December. No lesions present to test. Herpes type 2 IgG positive. Last day of outbreak was .   Will likely be 1 month from outbreak at the time of delivery.         Obesity in pregnancy ([facility])    Overview     - Recommend serial growth at 30 and 36 weeks  -  surveillance weekly starting at 34 weeks (NST or BPP)  - delivery 39 weeks or sooner if indicated           VIK (obstructive sleep apnea)     Overview     Dx with mild VIK in 2019 prior to bariatric surgery.   Never on treatment, symptoms improved after surgery         Paroxysmal supraventricular tachycardia (CMS-HCC)    Overview     Had ablation surgery when she was 13 yrs               Orders Placed This Encounter   Procedures    Labor Induction     Standing Status:   Future     Standing Expiration Date:   1/24/2025     Order Specific Question:   Preferred Location?     Answer:   [facility] [[id number]]     Order Specific Question:   Indication:     Answer:   Risk Reducing     Order Specific Question:   Reason for induction:     Answer:   IVF pregnancy      Growth scheduled for 12/31    RTC in 1 week      Kandice Castellon MD

## 2024-12-31 ENCOUNTER — APPOINTMENT (OUTPATIENT)
Dept: INFUSION THERAPY | Facility: CLINIC | Age: 34
End: 2024-12-31
Payer: COMMERCIAL

## 2025-01-05 ENCOUNTER — ANESTHESIA (OUTPATIENT)
Dept: OBSTETRICS AND GYNECOLOGY | Facility: HOSPITAL | Age: 35
End: 2025-01-05
Payer: COMMERCIAL

## 2025-01-05 ENCOUNTER — HOSPITAL ENCOUNTER (INPATIENT)
Facility: HOSPITAL | Age: 35
End: 2025-01-05
Attending: OBSTETRICS & GYNECOLOGY | Admitting: STUDENT IN AN ORGANIZED HEALTH CARE EDUCATION/TRAINING PROGRAM
Payer: COMMERCIAL

## 2025-01-05 ENCOUNTER — ANESTHESIA EVENT (OUTPATIENT)
Dept: OBSTETRICS AND GYNECOLOGY | Facility: HOSPITAL | Age: 35
End: 2025-01-05
Payer: COMMERCIAL

## 2025-01-05 VITALS
TEMPERATURE: 97.5 F | RESPIRATION RATE: 18 BRPM | HEART RATE: 97 BPM | SYSTOLIC BLOOD PRESSURE: 118 MMHG | DIASTOLIC BLOOD PRESSURE: 80 MMHG | OXYGEN SATURATION: 97 %

## 2025-01-05 DIAGNOSIS — Z3A.39 39 WEEKS GESTATION OF PREGNANCY (HHS-HCC): Primary | ICD-10-CM

## 2025-01-05 PROBLEM — J06.9 RECENT URI: Status: ACTIVE | Noted: 2025-01-05

## 2025-01-05 PROBLEM — K21.9 GASTROESOPHAGEAL REFLUX DISEASE: Status: ACTIVE | Noted: 2025-01-05

## 2025-01-05 PROBLEM — K50.90 CROHN'S DISEASE (MULTI): Status: ACTIVE | Noted: 2025-01-05

## 2025-01-05 LAB
ABO GROUP (TYPE) IN BLOOD: NORMAL
ALBUMIN SERPL BCP-MCNC: 3.5 G/DL (ref 3.4–5)
ALP SERPL-CCNC: 179 U/L (ref 33–110)
ALT SERPL W P-5'-P-CCNC: 60 U/L (ref 7–45)
ANION GAP SERPL CALC-SCNC: 15 MMOL/L (ref 10–20)
ANTIBODY SCREEN: NORMAL
AST SERPL W P-5'-P-CCNC: 44 U/L (ref 9–39)
BILIRUB SERPL-MCNC: 0.9 MG/DL (ref 0–1.2)
BLOOD EXPIRATION DATE: NORMAL
BLOOD EXPIRATION DATE: NORMAL
BUN SERPL-MCNC: 8 MG/DL (ref 6–23)
CALCIUM SERPL-MCNC: 9.6 MG/DL (ref 8.6–10.6)
CHLORIDE SERPL-SCNC: 103 MMOL/L (ref 98–107)
CO2 SERPL-SCNC: 21 MMOL/L (ref 21–32)
CREAT SERPL-MCNC: 0.4 MG/DL (ref 0.5–1.05)
DISPENSE STATUS: NORMAL
DISPENSE STATUS: NORMAL
EGFRCR SERPLBLD CKD-EPI 2021: >90 ML/MIN/1.73M*2
ERYTHROCYTE [DISTWIDTH] IN BLOOD BY AUTOMATED COUNT: 26.8 % (ref 11.5–14.5)
GLUCOSE SERPL-MCNC: 78 MG/DL (ref 74–99)
HCT VFR BLD AUTO: 37.2 % (ref 36–46)
HGB BLD-MCNC: 10.8 G/DL (ref 12–16)
MCH RBC QN AUTO: 21.3 PG (ref 26–34)
MCHC RBC AUTO-ENTMCNC: 29 G/DL (ref 32–36)
MCV RBC AUTO: 73 FL (ref 80–100)
NRBC BLD-RTO: 0 /100 WBCS (ref 0–0)
PLATELET # BLD AUTO: 452 X10*3/UL (ref 150–450)
POTASSIUM SERPL-SCNC: 4.3 MMOL/L (ref 3.5–5.3)
PRODUCT BLOOD TYPE: 5100
PRODUCT BLOOD TYPE: NORMAL
PRODUCT CODE: NORMAL
PRODUCT CODE: NORMAL
PROT SERPL-MCNC: 6.4 G/DL (ref 6.4–8.2)
RBC # BLD AUTO: 5.07 X10*6/UL (ref 4–5.2)
RH FACTOR (ANTIGEN D): NORMAL
SODIUM SERPL-SCNC: 135 MMOL/L (ref 136–145)
UNIT ABO: NORMAL
UNIT ABO: NORMAL
UNIT NUMBER: NORMAL
UNIT NUMBER: NORMAL
UNIT RH: NORMAL
UNIT RH: NORMAL
UNIT VOLUME: 278
UNIT VOLUME: 286
WBC # BLD AUTO: 9.2 X10*3/UL (ref 4.4–11.3)
XM INTEP: NORMAL
XM INTEP: NORMAL

## 2025-01-05 PROCEDURE — 2500000002 HC RX 250 W HCPCS SELF ADMINISTERED DRUGS (ALT 637 FOR MEDICARE OP, ALT 636 FOR OP/ED)

## 2025-01-05 PROCEDURE — 1120000001 HC OB PRIVATE ROOM DAILY

## 2025-01-05 PROCEDURE — 2500000004 HC RX 250 GENERAL PHARMACY W/ HCPCS (ALT 636 FOR OP/ED)

## 2025-01-05 PROCEDURE — 3E033VJ INTRODUCTION OF OTHER HORMONE INTO PERIPHERAL VEIN, PERCUTANEOUS APPROACH: ICD-10-PCS

## 2025-01-05 PROCEDURE — 2500000001 HC RX 250 WO HCPCS SELF ADMINISTERED DRUGS (ALT 637 FOR MEDICARE OP)

## 2025-01-05 PROCEDURE — 80053 COMPREHEN METABOLIC PANEL: CPT

## 2025-01-05 PROCEDURE — 7210000002 HC LABOR PER HOUR

## 2025-01-05 PROCEDURE — 86901 BLOOD TYPING SEROLOGIC RH(D): CPT

## 2025-01-05 PROCEDURE — 85027 COMPLETE CBC AUTOMATED: CPT

## 2025-01-05 PROCEDURE — 86923 COMPATIBILITY TEST ELECTRIC: CPT

## 2025-01-05 PROCEDURE — 86780 TREPONEMA PALLIDUM: CPT

## 2025-01-05 PROCEDURE — 36415 COLL VENOUS BLD VENIPUNCTURE: CPT

## 2025-01-05 RX ORDER — VENLAFAXINE 75 MG/1
75 TABLET ORAL 2 TIMES DAILY
Status: DISCONTINUED | OUTPATIENT
Start: 2025-01-05 | End: 2025-01-08 | Stop reason: HOSPADM

## 2025-01-05 RX ORDER — HYDRALAZINE HYDROCHLORIDE 20 MG/ML
5 INJECTION INTRAMUSCULAR; INTRAVENOUS ONCE AS NEEDED
Status: DISCONTINUED | OUTPATIENT
Start: 2025-01-05 | End: 2025-01-06

## 2025-01-05 RX ORDER — ONDANSETRON HYDROCHLORIDE 2 MG/ML
4 INJECTION, SOLUTION INTRAVENOUS EVERY 6 HOURS PRN
Status: DISCONTINUED | OUTPATIENT
Start: 2025-01-05 | End: 2025-01-06

## 2025-01-05 RX ORDER — OXYTOCIN 10 [USP'U]/ML
10 INJECTION, SOLUTION INTRAMUSCULAR; INTRAVENOUS ONCE AS NEEDED
Status: DISCONTINUED | OUTPATIENT
Start: 2025-01-05 | End: 2025-01-06

## 2025-01-05 RX ORDER — CARBOPROST TROMETHAMINE 250 UG/ML
250 INJECTION, SOLUTION INTRAMUSCULAR ONCE AS NEEDED
Status: COMPLETED | OUTPATIENT
Start: 2025-01-05 | End: 2025-01-06

## 2025-01-05 RX ORDER — TERBUTALINE SULFATE 1 MG/ML
0.25 INJECTION SUBCUTANEOUS ONCE AS NEEDED
Status: DISCONTINUED | OUTPATIENT
Start: 2025-01-05 | End: 2025-01-06

## 2025-01-05 RX ORDER — TRANEXAMIC ACID 100 MG/ML
1000 INJECTION, SOLUTION INTRAVENOUS ONCE AS NEEDED
Status: DISCONTINUED | OUTPATIENT
Start: 2025-01-05 | End: 2025-01-06

## 2025-01-05 RX ORDER — METHYLERGONOVINE MALEATE 0.2 MG/ML
0.2 INJECTION INTRAVENOUS ONCE AS NEEDED
Status: COMPLETED | OUTPATIENT
Start: 2025-01-05 | End: 2025-01-06

## 2025-01-05 RX ORDER — ONDANSETRON 4 MG/1
4 TABLET, FILM COATED ORAL EVERY 6 HOURS PRN
Status: DISCONTINUED | OUTPATIENT
Start: 2025-01-05 | End: 2025-01-06

## 2025-01-05 RX ORDER — OXYTOCIN/0.9 % SODIUM CHLORIDE 30/500 ML
2-30 PLASTIC BAG, INJECTION (ML) INTRAVENOUS CONTINUOUS
Status: DISCONTINUED | OUTPATIENT
Start: 2025-01-05 | End: 2025-01-06

## 2025-01-05 RX ORDER — OXYTOCIN/0.9 % SODIUM CHLORIDE 30/500 ML
60 PLASTIC BAG, INJECTION (ML) INTRAVENOUS ONCE AS NEEDED
Status: COMPLETED | OUTPATIENT
Start: 2025-01-05 | End: 2025-01-06

## 2025-01-05 RX ORDER — METOCLOPRAMIDE 10 MG/1
10 TABLET ORAL EVERY 6 HOURS PRN
Status: DISCONTINUED | OUTPATIENT
Start: 2025-01-05 | End: 2025-01-06

## 2025-01-05 RX ORDER — METOCLOPRAMIDE HYDROCHLORIDE 5 MG/ML
10 INJECTION INTRAMUSCULAR; INTRAVENOUS EVERY 6 HOURS PRN
Status: DISCONTINUED | OUTPATIENT
Start: 2025-01-05 | End: 2025-01-06

## 2025-01-05 RX ORDER — PANTOPRAZOLE SODIUM 40 MG/1
40 TABLET, DELAYED RELEASE ORAL
Status: DISCONTINUED | OUTPATIENT
Start: 2025-01-06 | End: 2025-01-06

## 2025-01-05 RX ORDER — VALACYCLOVIR HYDROCHLORIDE 500 MG/1
500 TABLET, FILM COATED ORAL DAILY
Status: DISCONTINUED | OUTPATIENT
Start: 2025-01-05 | End: 2025-01-06

## 2025-01-05 RX ORDER — LABETALOL HYDROCHLORIDE 5 MG/ML
20 INJECTION, SOLUTION INTRAVENOUS ONCE AS NEEDED
Status: DISCONTINUED | OUTPATIENT
Start: 2025-01-05 | End: 2025-01-06

## 2025-01-05 RX ORDER — SODIUM CHLORIDE 9 MG/ML
75 INJECTION, SOLUTION INTRAVENOUS CONTINUOUS
Status: DISCONTINUED | OUTPATIENT
Start: 2025-01-05 | End: 2025-01-06

## 2025-01-05 RX ORDER — FENTANYL/ROPIVACAINE/NS/PF 2MCG/ML-.2
0-25 PLASTIC BAG, INJECTION (ML) INJECTION CONTINUOUS
Status: DISCONTINUED | OUTPATIENT
Start: 2025-01-05 | End: 2025-01-06

## 2025-01-05 RX ORDER — MISOPROSTOL 200 UG/1
800 TABLET ORAL ONCE AS NEEDED
Status: COMPLETED | OUTPATIENT
Start: 2025-01-05 | End: 2025-01-06

## 2025-01-05 RX ORDER — LIDOCAINE HYDROCHLORIDE 10 MG/ML
30 INJECTION, SOLUTION INFILTRATION; PERINEURAL ONCE AS NEEDED
Status: COMPLETED | OUTPATIENT
Start: 2025-01-05 | End: 2025-01-06

## 2025-01-05 RX ORDER — NIFEDIPINE 10 MG/1
10 CAPSULE ORAL ONCE AS NEEDED
Status: DISCONTINUED | OUTPATIENT
Start: 2025-01-05 | End: 2025-01-06

## 2025-01-05 RX ORDER — LOPERAMIDE HYDROCHLORIDE 2 MG/1
4 CAPSULE ORAL EVERY 2 HOUR PRN
Status: DISCONTINUED | OUTPATIENT
Start: 2025-01-05 | End: 2025-01-06

## 2025-01-05 RX ADMIN — Medication 2 MILLI-UNITS/MIN: at 22:14

## 2025-01-05 RX ADMIN — VENLAFAXINE 75 MG: 75 TABLET ORAL at 23:24

## 2025-01-05 RX ADMIN — SODIUM CHLORIDE 75 ML/HR: 9 INJECTION, SOLUTION INTRAVENOUS at 22:11

## 2025-01-05 RX ADMIN — VALACYCLOVIR 500 MG: 500 TABLET, FILM COATED ORAL at 23:25

## 2025-01-05 SDOH — ECONOMIC STABILITY: FOOD INSECURITY: WITHIN THE PAST 12 MONTHS, THE FOOD YOU BOUGHT JUST DIDN'T LAST AND YOU DIDN'T HAVE MONEY TO GET MORE.: NEVER TRUE

## 2025-01-05 SDOH — SOCIAL STABILITY: SOCIAL INSECURITY: ARE YOU OR HAVE YOU BEEN THREATENED OR ABUSED PHYSICALLY, EMOTIONALLY, OR SEXUALLY BY ANYONE?: NO

## 2025-01-05 SDOH — SOCIAL STABILITY: SOCIAL INSECURITY: ARE THERE ANY APPARENT SIGNS OF INJURIES/BEHAVIORS THAT COULD BE RELATED TO ABUSE/NEGLECT?: NO

## 2025-01-05 SDOH — HEALTH STABILITY: MENTAL HEALTH: WISH TO BE DEAD (PAST 1 MONTH): NO

## 2025-01-05 SDOH — SOCIAL STABILITY: SOCIAL INSECURITY: WITHIN THE LAST YEAR, HAVE YOU BEEN HUMILIATED OR EMOTIONALLY ABUSED IN OTHER WAYS BY YOUR PARTNER OR EX-PARTNER?: NO

## 2025-01-05 SDOH — HEALTH STABILITY: MENTAL HEALTH: NON-SPECIFIC ACTIVE SUICIDAL THOUGHTS (PAST 1 MONTH): NO

## 2025-01-05 SDOH — SOCIAL STABILITY: SOCIAL INSECURITY: WITHIN THE LAST YEAR, HAVE YOU BEEN AFRAID OF YOUR PARTNER OR EX-PARTNER?: NO

## 2025-01-05 SDOH — ECONOMIC STABILITY: FOOD INSECURITY: WITHIN THE PAST 12 MONTHS, YOU WORRIED THAT YOUR FOOD WOULD RUN OUT BEFORE YOU GOT THE MONEY TO BUY MORE.: NEVER TRUE

## 2025-01-05 SDOH — HEALTH STABILITY: MENTAL HEALTH: WERE YOU ABLE TO COMPLETE ALL THE BEHAVIORAL HEALTH SCREENINGS?: YES

## 2025-01-05 SDOH — SOCIAL STABILITY: SOCIAL INSECURITY: PHYSICAL ABUSE: DENIES

## 2025-01-05 SDOH — HEALTH STABILITY: MENTAL HEALTH: SUICIDAL BEHAVIOR (LIFETIME): NO

## 2025-01-05 SDOH — SOCIAL STABILITY: SOCIAL INSECURITY: DOES ANYONE TRY TO KEEP YOU FROM HAVING/CONTACTING OTHER FRIENDS OR DOING THINGS OUTSIDE YOUR HOME?: NO

## 2025-01-05 SDOH — ECONOMIC STABILITY: TRANSPORTATION INSECURITY: IN THE PAST 12 MONTHS, HAS LACK OF TRANSPORTATION KEPT YOU FROM MEDICAL APPOINTMENTS OR FROM GETTING MEDICATIONS?: NO

## 2025-01-05 SDOH — SOCIAL STABILITY: SOCIAL INSECURITY: HAVE YOU HAD ANY THOUGHTS OF HARMING ANYONE ELSE?: NO

## 2025-01-05 SDOH — SOCIAL STABILITY: SOCIAL INSECURITY: ABUSE SCREEN: ADULT

## 2025-01-05 SDOH — SOCIAL STABILITY: SOCIAL INSECURITY: DO YOU FEEL ANYONE HAS EXPLOITED OR TAKEN ADVANTAGE OF YOU FINANCIALLY OR OF YOUR PERSONAL PROPERTY?: NO

## 2025-01-05 SDOH — SOCIAL STABILITY: SOCIAL INSECURITY: HAS ANYONE EVER THREATENED TO HURT YOUR FAMILY OR YOUR PETS?: NO

## 2025-01-05 SDOH — ECONOMIC STABILITY: FOOD INSECURITY: HOW HARD IS IT FOR YOU TO PAY FOR THE VERY BASICS LIKE FOOD, HOUSING, MEDICAL CARE, AND HEATING?: NOT HARD AT ALL

## 2025-01-05 SDOH — SOCIAL STABILITY: SOCIAL INSECURITY: VERBAL ABUSE: DENIES

## 2025-01-05 SDOH — ECONOMIC STABILITY: HOUSING INSECURITY: DO YOU FEEL UNSAFE GOING BACK TO THE PLACE WHERE YOU ARE LIVING?: NO

## 2025-01-05 SDOH — HEALTH STABILITY: MENTAL HEALTH: CURRENT SMOKER: 0

## 2025-01-05 SDOH — SOCIAL STABILITY: SOCIAL INSECURITY: HAVE YOU HAD THOUGHTS OF HARMING ANYONE ELSE?: NO

## 2025-01-05 ASSESSMENT — LIFESTYLE VARIABLES
HOW MANY STANDARD DRINKS CONTAINING ALCOHOL DO YOU HAVE ON A TYPICAL DAY: PATIENT DOES NOT DRINK
HOW OFTEN DO YOU HAVE 6 OR MORE DRINKS ON ONE OCCASION: NEVER
AUDIT-C TOTAL SCORE: 0
AUDIT-C TOTAL SCORE: 0
HOW OFTEN DO YOU HAVE A DRINK CONTAINING ALCOHOL: NEVER
SKIP TO QUESTIONS 9-10: 1

## 2025-01-05 ASSESSMENT — PATIENT HEALTH QUESTIONNAIRE - PHQ9
SUM OF ALL RESPONSES TO PHQ9 QUESTIONS 1 & 2: 0
2. FEELING DOWN, DEPRESSED OR HOPELESS: NOT AT ALL
1. LITTLE INTEREST OR PLEASURE IN DOING THINGS: NOT AT ALL

## 2025-01-05 ASSESSMENT — PAIN SCALES - GENERAL
PAINLEVEL_OUTOF10: 4
PAINLEVEL_OUTOF10: 5 - MODERATE PAIN
PAINLEVEL_OUTOF10: 0 - NO PAIN

## 2025-01-05 ASSESSMENT — ACTIVITIES OF DAILY LIVING (ADL)
LACK_OF_TRANSPORTATION: NO
LACK_OF_TRANSPORTATION: NO

## 2025-01-05 NOTE — ANESTHESIA PREPROCEDURE EVALUATION
"Patient: Nicky Herbert \"Yasmeen\"    Evaluation Method: In-person visit    Procedure Information    Date: 01/05/25  Procedure: Labor Consult         Relevant Problems   Anesthesia (within normal limits)      Cardiac   (+) Paroxysmal supraventricular tachycardia (CMS-HCC)      Pulmonary   (+) Recent URI (Cough, congestion about 2 weeks ago)      Neuro   (+) Anxiety      GI  History of gastric sleeve,   (+) Gastroesophageal reflux disease      /Renal (within normal limits)      Endocrine   (+) Obesity in pregnancy ([facility])      Hematology   (+) Anemia affecting second pregnancy ([facility])      Musculoskeletal (within normal limits)      ID   (+) Genital herpes      GYN   (+) 37 weeks gestation of pregnancy ([facility])   (+) 39 weeks gestation of pregnancy ([facility])       Clinical information reviewed:    Allergies                NPO Detail:  No data recorded     OB/GYN     Physical Exam    Airway  Mallampati: III  TM distance: >3 FB     Cardiovascular - normal exam  Rhythm: regular     Dental    Pulmonary - normal exam     Abdominal            Anesthesia Plan    History of general anesthesia?: yes  History of complications of general anesthesia?: no    ASA 3     epidural     The patient is not a current smoker.    Anesthetic plan and risks discussed with patient.  Use of blood products discussed with patient who consented to blood products.        "

## 2025-01-05 NOTE — PROGRESS NOTES
Intrapartum Progress Note    Assessment/Plan   Nicky Herbert is a 34 y.o.  at 39w1d. FLACO: 2025, by Embryo Transfer admitted after SROM.     PROM  -SROM at 1500  -Last SVE: /-3, pt feeling relatively comfortable  -Recheck as clinically indicated by maternal or fetal status  -will augment with pitocin if unchanged exam in another couple hours  -epidural per pt request     Fetal Status  -CEFM, Cat I  -Presentation vertex based on ultrasound  -EFW: unable to [place] due to habitus, last US  2300g 25% with AC 42%  -GBS neg      Postpartum  Contraception Plan: patient declined     Pregnancy notable for:  AMA  BMI 45  IVF pregnancy  Paroxysmal SVT hx ablation @ 14yo   Bariatric surgery with hiatal hernia repair   Abdominoplasty   Anemia, PO iron + IV iron x3, admission Hgb 10.8  VIK   HSV on Valtrex, neg SSE on admission  Anxiety, effexor 75 mg daily    Seen and discussed with Dr. Олег Alba MD, PGY-1    Assessment & Plan  39 weeks gestation of pregnancy ([facility])    Recent URI    Gastroesophageal reflux disease    Pregnancy Problems (from 24 to present)       Problem Noted Diagnosed Resolved    39 weeks gestation of pregnancy ([facility]) 2025 by So Young MD  No    Priority:  Medium       Anxiety in pregnancy, antepartum ([facility]) 2024 by Kandice Castellon MD  No    Priority:  Medium       Overview Signed 2024  1:23 PM by Kandice Castellon MD     Well controlled on effexor         Bariatric surgery status complicating pregnancy, second trimester ([facility]) 2024 by Kandice Castellon MD  No    Priority:  Medium       Overview Addendum 2024 12:46 PM by Kandice Castellon MD     - low vitamin D, started supplement, will repeat next visit  - iron deficiency anemia, IV infusions scheduled  - Recommend serial growth ultrasounds in third trimester             37 weeks gestation of pregnancy ([facility]) 2024 by Kandice RICARDO  "MD Cande  No    Priority:  Medium       Overview Addendum 2024 11:30 AM by Kandice Castellon MD     Desired provider in labor: [] CNM  [x] Physician  [x] Blood Products: [x] Yes, accepts [] No, needs counseling  [x] Initial BMI: 45  [x] Prenatal Labs: Received records from [facility], scanned to chart prenatal labs reviewed (hgb 10.7, ferritin 5, HIV NR, Hep B NR, Hep C NR, Rubella immune, RPR neg, Gc/chlamydia neg, hgb A1c 5.2, O+  [x] Cervical Cancer Screening up to date, neg co-testing 2023  [x] Rh status: O+ on chart review  [x] Genetic Screening:  cf DNA \"No call\" at 10-11 weeks gestation, re drawn at [facility] and rr cfDNA. discussed this result and option for genetic counseling/diagnostic testing and she declines  [x] NT US: (11-13 wks) Normal  [x] Baby ASA (if indicated): Indicated and taking  [x] Pregnancy dated by: day 5 ET    [x] Anatomy US: (19-20 wks): completed and normal  [x] Federal Sterilization consent signed (if indicated): No, infertility   [x] 1hr GCT at 24-28wks: normal, 117  [x] Rhogam (if indicated): O+ on chart review, not indicated  [x] Fetal Surveillance (if indicated): Weekly at 34  [x] Tdap (27-32 wks, may be given up to 36 wks if initial window missed): Given 10/31  [x] RSV (32-36 wks) (Sept. to end of ): Declined  [x] Flu Vaccine: Given 24    [x] Breastfeeding: Plans to bf and pump, plans to take class, did not bf daughter  [x] Postpartum Birth control method: Declines, IVF pregnancy  [x] GBS at 36 - 37 wks: negative  [x] 39 weeks discussion of IOL vs. Expectant management: Scheduled  at 39w3d  [x] Mode of delivery ( anticipated ):            Obesity in pregnancy ([facility]) 10/17/2023 by Lupe Bah  No    Priority:  Medium       Overview Signed 2024  1:21 PM by Kandice Castellon MD     - Recommend serial growth at 30 and 36 weeks  -  surveillance weekly starting at 34 weeks (NST or BPP)  - delivery 39 weeks or sooner if indicated           Anemia " affecting second pregnancy ([facility]) 10/17/2023 by Lupe Bah  No    Priority:  Medium       Overview Addendum 12/24/2024 12:34 PM by Kandice Castellon MD     Pt reports 3 IV iron transfusions earlier this pregnancy, Hgb 9.9 on 10/3/24, ferritin 11  IV iron transfusions ordered, did not go, has rescheduled multiple times  Hgb now 8.7.   Had 2 iron transfusions last week. One more scheduled prior to IOL  Plan for repeat labs at time of IOL                 Subjective   Patient is doing well. Feeling intermittent contractions.    Objective   Last Vitals:  Temp Pulse Resp BP MAP Pulse Ox   36.1 °C (97 °F) 93 18 118/88 98 97 %     Vitals Min/Max Last 24 Hours:  Temp  Min: 35.3 °C (95.5 °F)  Max: 36.1 °C (97 °F)  Pulse  Min: 93  Max: 121  Resp  Min: 18  Max: 18  BP  Min: 118/88  Max: 141/96  MAP (mmHg)  Min: 98  Max: 113    Intake/Output:  No intake or output data in the 24 hours ending 01/05/25 9193    Physical Examination:  General: Well appearing, alert  HEENT: normocephalic, EOMI, clear sclera  Cardio: Warm and well perfused  Resp: breathing comfortably on room air  Abd: gravid  Neuro: grossly intact, no focal deficits  Extremities: full ROM  Psych: A&O x3, appropriate mood and affect    FHT: 135/mod/+accel/-decel  [place]: irregular contractions    Lab Review:  Labs in chart were reviewed.  Hgb 10.8

## 2025-01-05 NOTE — H&P
OB Admission H&P    Assessment/Plan    Nicky Herbert is a 34 y.o.  at 39w1d, FLACO: 2025, by Embryo Transfer, who is admitted for Ruptured Membranes >/+= 34 weeks.    Plan  -Admit to L&D, consented  -T&S, CBC, and Syphilis  -Epidural at patient request  -Recheck as clinically indicated by maternal or fetal status  -Grossly ruptured for mec on admission, counseled about peds present at delivery, pt amenable and had with last pregnancy     Fetal Status  -NST reactive, reassuring   -Presentation vertex based on ultrasound  -EFW unable to Leopolds last US  2300g 25% with AC 42%  -GBS neg     Postpartum  Contraception Plan: patient declined    Pregnancy notables  AMA  BMI 45  IVF pregnancy  Paroxysmal SVT hx ablation @ 12yo   Bariatric surgery with hiatal hernia repair   Abdominoplasty   Anemia Po iron IV iron x3 admission Hb pending last 8.7 not on PO iron, T&C 2u  VIK   HSV neg SSE on admission on valtrex, does not want discussed   Anxiety effexor 75 mg    Pregnancy Problems (from 24 to present)       Problem Noted Diagnosed Resolved    39 weeks gestation of pregnancy ([facility]) 2025 by So Young MD  No    Priority:  Medium       Anxiety in pregnancy, antepartum ([facility]) 2024 by Kandice Castellon MD  No    Priority:  Medium       Overview Signed 2024  1:23 PM by Kandice Castellon MD     Well controlled on effexor         Bariatric surgery status complicating pregnancy, second trimester ([facility]) 2024 by Kandice Castellon MD  No    Priority:  Medium       Overview Addendum 2024 12:46 PM by Kandice Castellon MD     - low vitamin D, started supplement, will repeat next visit  - iron deficiency anemia, IV infusions scheduled  - Recommend serial growth ultrasounds in third trimester             37 weeks gestation of pregnancy ([facility]) 2024 by Kandice Castellon MD  No    Priority:  Medium       Overview Addendum 2024 11:30 AM  "by Kandice Castellon MD     Desired provider in labor: [] CNM  [x] Physician  [x] Blood Products: [x] Yes, accepts [] No, needs counseling  [x] Initial BMI: 45  [x] Prenatal Labs: Received records from [facility], scanned to chart prenatal labs reviewed (hgb 10.7, ferritin 5, HIV NR, Hep B NR, Hep C NR, Rubella immune, RPR neg, Gc/chlamydia neg, hgb A1c 5.2, O+  [x] Cervical Cancer Screening up to date, neg co-testing 2023  [x] Rh status: O+ on chart review  [x] Genetic Screening:  cf DNA \"No call\" at 10-11 weeks gestation, re drawn at [facility] and rr cfDNA. discussed this result and option for genetic counseling/diagnostic testing and she declines  [x] NT US: (11-13 wks) Normal  [x] Baby ASA (if indicated): Indicated and taking  [x] Pregnancy dated by: day 5 ET    [x] Anatomy US: (19-20 wks): completed and normal  [x] Federal Sterilization consent signed (if indicated): No, infertility   [x] 1hr GCT at 24-28wks: normal, 117  [x] Rhogam (if indicated): O+ on chart review, not indicated  [x] Fetal Surveillance (if indicated): Weekly at 34  [x] Tdap (27-32 wks, may be given up to 36 wks if initial window missed): Given 10/31  [x] RSV (32-36 wks) (Sept. to end of ): Declined  [x] Flu Vaccine: Given 24    [x] Breastfeeding: Plans to bf and pump, plans to take class, did not bf daughter  [x] Postpartum Birth control method: Declines, IVF pregnancy  [x] GBS at 36 - 37 wks: negative  [x] 39 weeks discussion of IOL vs. Expectant management: Scheduled  at 39w3d  [x] Mode of delivery ( anticipated ):            Obesity in pregnancy ([facility]) 10/17/2023 by Lupe Bah  No    Priority:  Medium       Overview Signed 2024  1:21 PM by Kandice Castellon MD     - Recommend serial growth at 30 and 36 weeks  -  surveillance weekly starting at 34 weeks (NST or BPP)  - delivery 39 weeks or sooner if indicated           Anemia affecting second pregnancy ([facility]) 10/17/2023 by Lupe Bah  No    Priority:  " Medium       Overview Addendum 2024 12:34 PM by Kandice Castellon MD     Pt reports 3 IV iron transfusions earlier this pregnancy, Hgb 9.9 on 10/3/24, ferritin 11  IV iron transfusions ordered, did not go, has rescheduled multiple times  Hgb now 8.7.   Had 2 iron transfusions last week. One more scheduled prior to IOL  Plan for repeat labs at time of IOL                 Subjective   Doing well overall, SROM at home 1500. Baby still moving well, no bleeding. Feeling some ctx but nothing too painful. No other complaints or questions at this time.     Prenatal Provider Cande    OB History    Para Term  AB Living   2 1 1 0 0 1   SAB IAB Ectopic Multiple Live Births   0 0 0 0 1      # Outcome Date GA Lbr Allen/2nd Weight Sex Type Anes PTL Lv   2 Current            1 Term 09/07/10 39w5d  3.289 kg F Vag-Spont EPI N SINA       Past Surgical History:   Procedure Laterality Date    ABDOMINAL SURGERY      BARIATRIC SURGERY      CARDIAC SURGERY      COLONOSCOPY      OTHER SURGICAL HISTORY  10/24/2014    Cardiac Catheter His Ablation    OTHER SURGICAL HISTORY  2024    Oocyte  Retrieval    TONSILLECTOMY         Social History     Tobacco Use    Smoking status: Former     Current packs/day: 0.00     Average packs/day: 0.3 packs/day for 2.0 years (0.5 ttl pk-yrs)     Types: Cigarettes     Quit date: 3/1/2024     Years since quittin.8    Smokeless tobacco: Never   Substance Use Topics    Alcohol use: Never       No Known Allergies    Medications Prior to Admission   Medication Sig Dispense Refill Last Dose/Taking    ergocalciferol (Vitamin D-2) 1.25 MG (21918 UT) capsule Take 1 capsule (1,250 mcg) by mouth 1 (one) time per week.   Past Week    omeprazole (PriLOSEC) 40 mg DR capsule Take 1 capsule (40 mg) by mouth once daily.   2025    valACYclovir (Valtrex) 500 mg tablet Take 1 tablet (500 mg) by mouth once daily. 90 tablet 3 2025    venlafaxine (Effexor) 75 mg tablet Take 1  tablet (75 mg) by mouth 2 times a day.   1/5/2025    aspirin 81 mg EC tablet Take 1 tablet (81 mg) by mouth once daily. (Patient not taking: Reported on 1/5/2025)   More than a month     Objective     Last Vitals  Temp Pulse Resp BP MAP O2 Sat   36.1 °C (97 °F) (!) 111 18 (!) 141/96 113 97 %     Blood Pressures         1/5/2025  1737             BP: 141/96             Physical Exam  General: NAD, mood appropriate  Cardiopulmonary: warm and well perfused, breathing comfortably on room air  Abdomen: Gravid, non-tender  Extremities: Symmetric  Speculum Exam: meconium with pooling fluid, pt without HSV lesions externally or internally  Cervix: 2 /70 /-3      Fetal Monitoring  Baseline: 135 bpm, Variability: moderate,  Accelerations: present and Decelerations: none  Uterine Activity: Irregular contractions  Interpretation: Reactive    Bedside ultrasound: Yes vertex    Labs in chart were reviewed. Admission labs pending   Most recent Hb 8.7 on 12/12    Prenatal labs reviewed, remarkable for Hb jemima 8.7.

## 2025-01-06 ENCOUNTER — ANESTHESIA EVENT (OUTPATIENT)
Dept: OBSTETRICS AND GYNECOLOGY | Facility: HOSPITAL | Age: 35
End: 2025-01-06
Payer: COMMERCIAL

## 2025-01-06 ENCOUNTER — ANESTHESIA (OUTPATIENT)
Dept: OBSTETRICS AND GYNECOLOGY | Facility: HOSPITAL | Age: 35
End: 2025-01-06
Payer: COMMERCIAL

## 2025-01-06 LAB
ABO GROUP (TYPE) IN BLOOD: NORMAL
ANTIBODY SCREEN: NORMAL
APTT PPP: 30 SECONDS (ref 27–38)
ERYTHROCYTE [DISTWIDTH] IN BLOOD BY AUTOMATED COUNT: 26.6 % (ref 11.5–14.5)
FIBRINOGEN PPP-MCNC: 486 MG/DL (ref 200–400)
HCT VFR BLD AUTO: 33.7 % (ref 36–46)
HGB BLD-MCNC: 9.7 G/DL (ref 12–16)
INR PPP: 1.2 (ref 0.9–1.1)
MCH RBC QN AUTO: 22.1 PG (ref 26–34)
MCHC RBC AUTO-ENTMCNC: 28.8 G/DL (ref 32–36)
MCV RBC AUTO: 77 FL (ref 80–100)
NRBC BLD-RTO: 0 /100 WBCS (ref 0–0)
PLATELET # BLD AUTO: 433 X10*3/UL (ref 150–450)
PROTHROMBIN TIME: 13.5 SECONDS (ref 9.8–12.8)
RBC # BLD AUTO: 4.39 X10*6/UL (ref 4–5.2)
RH FACTOR (ANTIGEN D): NORMAL
TREPONEMA PALLIDUM IGG+IGM AB [PRESENCE] IN SERUM OR PLASMA BY IMMUNOASSAY: NONREACTIVE
WBC # BLD AUTO: 16.3 X10*3/UL (ref 4.4–11.3)

## 2025-01-06 PROCEDURE — 2500000004 HC RX 250 GENERAL PHARMACY W/ HCPCS (ALT 636 FOR OP/ED): Performed by: STUDENT IN AN ORGANIZED HEALTH CARE EDUCATION/TRAINING PROGRAM

## 2025-01-06 PROCEDURE — 85027 COMPLETE CBC AUTOMATED: CPT | Performed by: STUDENT IN AN ORGANIZED HEALTH CARE EDUCATION/TRAINING PROGRAM

## 2025-01-06 PROCEDURE — 51701 INSERT BLADDER CATHETER: CPT

## 2025-01-06 PROCEDURE — 0W3R7ZZ CONTROL BLEEDING IN GENITOURINARY TRACT, VIA NATURAL OR ARTIFICIAL OPENING: ICD-10-PCS

## 2025-01-06 PROCEDURE — 2500000005 HC RX 250 GENERAL PHARMACY W/O HCPCS

## 2025-01-06 PROCEDURE — 2720000007 HC OR 272 NO HCPCS

## 2025-01-06 PROCEDURE — 0HQ9XZZ REPAIR PERINEUM SKIN, EXTERNAL APPROACH: ICD-10-PCS

## 2025-01-06 PROCEDURE — 2500000001 HC RX 250 WO HCPCS SELF ADMINISTERED DRUGS (ALT 637 FOR MEDICARE OP)

## 2025-01-06 PROCEDURE — 1100000001 HC PRIVATE ROOM DAILY

## 2025-01-06 PROCEDURE — 59050 FETAL MONITOR W/REPORT: CPT

## 2025-01-06 PROCEDURE — 7100000016 HC LABOR RECOVERY PER HOUR

## 2025-01-06 PROCEDURE — 2500000004 HC RX 250 GENERAL PHARMACY W/ HCPCS (ALT 636 FOR OP/ED)

## 2025-01-06 PROCEDURE — 36415 COLL VENOUS BLD VENIPUNCTURE: CPT

## 2025-01-06 PROCEDURE — 59409 OBSTETRICAL CARE: CPT | Performed by: OBSTETRICS & GYNECOLOGY

## 2025-01-06 PROCEDURE — 3700000014 EPIDURAL BLOCK: Mod: GC | Performed by: STUDENT IN AN ORGANIZED HEALTH CARE EDUCATION/TRAINING PROGRAM

## 2025-01-06 PROCEDURE — 3E0P7VZ INTRODUCTION OF HORMONE INTO FEMALE REPRODUCTIVE, VIA NATURAL OR ARTIFICIAL OPENING: ICD-10-PCS

## 2025-01-06 PROCEDURE — 85610 PROTHROMBIN TIME: CPT | Performed by: STUDENT IN AN ORGANIZED HEALTH CARE EDUCATION/TRAINING PROGRAM

## 2025-01-06 PROCEDURE — 7210000002 HC LABOR PER HOUR

## 2025-01-06 PROCEDURE — 86901 BLOOD TYPING SEROLOGIC RH(D): CPT

## 2025-01-06 PROCEDURE — 59410 OBSTETRICAL CARE: CPT

## 2025-01-06 PROCEDURE — 85384 FIBRINOGEN ACTIVITY: CPT | Performed by: STUDENT IN AN ORGANIZED HEALTH CARE EDUCATION/TRAINING PROGRAM

## 2025-01-06 PROCEDURE — 99199 UNLISTED SPECIAL SVC PX/RPRT: CPT

## 2025-01-06 PROCEDURE — 2500000002 HC RX 250 W HCPCS SELF ADMINISTERED DRUGS (ALT 637 FOR MEDICARE OP, ALT 636 FOR OP/ED)

## 2025-01-06 PROCEDURE — 59899 UNLISTED PX MAT CARE&DLVR: CPT

## 2025-01-06 RX ORDER — LOPERAMIDE HYDROCHLORIDE 2 MG/1
4 CAPSULE ORAL EVERY 2 HOUR PRN
Status: DISCONTINUED | OUTPATIENT
Start: 2025-01-06 | End: 2025-01-08 | Stop reason: HOSPADM

## 2025-01-06 RX ORDER — HYDRALAZINE HYDROCHLORIDE 20 MG/ML
5 INJECTION INTRAMUSCULAR; INTRAVENOUS ONCE AS NEEDED
Status: DISCONTINUED | OUTPATIENT
Start: 2025-01-06 | End: 2025-01-08 | Stop reason: HOSPADM

## 2025-01-06 RX ORDER — METHYLERGONOVINE MALEATE 0.2 MG/ML
0.2 INJECTION INTRAVENOUS ONCE AS NEEDED
Status: DISCONTINUED | OUTPATIENT
Start: 2025-01-06 | End: 2025-01-08 | Stop reason: HOSPADM

## 2025-01-06 RX ORDER — IBUPROFEN 600 MG/1
600 TABLET ORAL EVERY 6 HOURS
Status: DISCONTINUED | OUTPATIENT
Start: 2025-01-06 | End: 2025-01-08 | Stop reason: HOSPADM

## 2025-01-06 RX ORDER — OXYTOCIN 10 [USP'U]/ML
10 INJECTION, SOLUTION INTRAMUSCULAR; INTRAVENOUS ONCE AS NEEDED
Status: DISCONTINUED | OUTPATIENT
Start: 2025-01-06 | End: 2025-01-08 | Stop reason: HOSPADM

## 2025-01-06 RX ORDER — ONDANSETRON HYDROCHLORIDE 2 MG/ML
4 INJECTION, SOLUTION INTRAVENOUS EVERY 6 HOURS PRN
Status: DISCONTINUED | OUTPATIENT
Start: 2025-01-06 | End: 2025-01-08 | Stop reason: HOSPADM

## 2025-01-06 RX ORDER — ADHESIVE BANDAGE
10 BANDAGE TOPICAL
Status: DISCONTINUED | OUTPATIENT
Start: 2025-01-06 | End: 2025-01-08 | Stop reason: HOSPADM

## 2025-01-06 RX ORDER — LABETALOL HYDROCHLORIDE 5 MG/ML
20 INJECTION, SOLUTION INTRAVENOUS ONCE AS NEEDED
Status: DISCONTINUED | OUTPATIENT
Start: 2025-01-06 | End: 2025-01-08 | Stop reason: HOSPADM

## 2025-01-06 RX ORDER — SIMETHICONE 80 MG
80 TABLET,CHEWABLE ORAL 4 TIMES DAILY PRN
Status: DISCONTINUED | OUTPATIENT
Start: 2025-01-06 | End: 2025-01-08 | Stop reason: HOSPADM

## 2025-01-06 RX ORDER — LIDOCAINE 560 MG/1
1 PATCH PERCUTANEOUS; TOPICAL; TRANSDERMAL
Status: DISCONTINUED | OUTPATIENT
Start: 2025-01-06 | End: 2025-01-08 | Stop reason: HOSPADM

## 2025-01-06 RX ORDER — CARBOPROST TROMETHAMINE 250 UG/ML
250 INJECTION, SOLUTION INTRAMUSCULAR ONCE AS NEEDED
Status: DISCONTINUED | OUTPATIENT
Start: 2025-01-06 | End: 2025-01-08 | Stop reason: HOSPADM

## 2025-01-06 RX ORDER — DIPHENHYDRAMINE HYDROCHLORIDE 50 MG/ML
25 INJECTION INTRAMUSCULAR; INTRAVENOUS EVERY 6 HOURS PRN
Status: DISCONTINUED | OUTPATIENT
Start: 2025-01-06 | End: 2025-01-08 | Stop reason: HOSPADM

## 2025-01-06 RX ORDER — DIPHENHYDRAMINE HCL 25 MG
25 CAPSULE ORAL EVERY 6 HOURS PRN
Status: DISCONTINUED | OUTPATIENT
Start: 2025-01-06 | End: 2025-01-08 | Stop reason: HOSPADM

## 2025-01-06 RX ORDER — NIFEDIPINE 10 MG/1
10 CAPSULE ORAL ONCE AS NEEDED
Status: DISCONTINUED | OUTPATIENT
Start: 2025-01-06 | End: 2025-01-08 | Stop reason: HOSPADM

## 2025-01-06 RX ORDER — ONDANSETRON 4 MG/1
4 TABLET, FILM COATED ORAL EVERY 6 HOURS PRN
Status: DISCONTINUED | OUTPATIENT
Start: 2025-01-06 | End: 2025-01-08 | Stop reason: HOSPADM

## 2025-01-06 RX ORDER — ENOXAPARIN SODIUM 100 MG/ML
60 INJECTION SUBCUTANEOUS EVERY 24 HOURS
Status: DISCONTINUED | OUTPATIENT
Start: 2025-01-07 | End: 2025-01-08 | Stop reason: HOSPADM

## 2025-01-06 RX ORDER — OXYTOCIN/0.9 % SODIUM CHLORIDE 30/500 ML
60 PLASTIC BAG, INJECTION (ML) INTRAVENOUS ONCE AS NEEDED
Status: DISCONTINUED | OUTPATIENT
Start: 2025-01-06 | End: 2025-01-08 | Stop reason: HOSPADM

## 2025-01-06 RX ORDER — ACETAMINOPHEN 325 MG/1
975 TABLET ORAL EVERY 6 HOURS
Status: DISCONTINUED | OUTPATIENT
Start: 2025-01-06 | End: 2025-01-08 | Stop reason: HOSPADM

## 2025-01-06 RX ORDER — BISACODYL 10 MG/1
10 SUPPOSITORY RECTAL DAILY PRN
Status: DISCONTINUED | OUTPATIENT
Start: 2025-01-06 | End: 2025-01-08 | Stop reason: HOSPADM

## 2025-01-06 RX ORDER — MISOPROSTOL 200 UG/1
800 TABLET ORAL ONCE AS NEEDED
Status: DISCONTINUED | OUTPATIENT
Start: 2025-01-06 | End: 2025-01-08 | Stop reason: HOSPADM

## 2025-01-06 RX ORDER — POLYETHYLENE GLYCOL 3350 17 G/17G
17 POWDER, FOR SOLUTION ORAL 2 TIMES DAILY PRN
Status: DISCONTINUED | OUTPATIENT
Start: 2025-01-06 | End: 2025-01-08 | Stop reason: HOSPADM

## 2025-01-06 RX ADMIN — CARBOPROST TROMETHAMINE 250 MCG: 250 SOLUTION INTRAMUSCULAR at 06:27

## 2025-01-06 RX ADMIN — ONDANSETRON 4 MG: 2 INJECTION INTRAMUSCULAR; INTRAVENOUS at 06:41

## 2025-01-06 RX ADMIN — VALACYCLOVIR 500 MG: 500 TABLET, FILM COATED ORAL at 08:08

## 2025-01-06 RX ADMIN — Medication 60 MILLI-UNITS/MIN: at 06:30

## 2025-01-06 RX ADMIN — ACETAMINOPHEN 975 MG: 325 TABLET ORAL at 08:07

## 2025-01-06 RX ADMIN — IBUPROFEN 600 MG: 600 TABLET, FILM COATED ORAL at 08:08

## 2025-01-06 RX ADMIN — BENZOCAINE AND LEVOMENTHOL 1 APPLICATION: 200; 5 SPRAY TOPICAL at 12:57

## 2025-01-06 RX ADMIN — ACETAMINOPHEN 975 MG: 325 TABLET ORAL at 16:46

## 2025-01-06 RX ADMIN — IBUPROFEN 600 MG: 600 TABLET, FILM COATED ORAL at 22:55

## 2025-01-06 RX ADMIN — VENLAFAXINE 75 MG: 75 TABLET ORAL at 11:51

## 2025-01-06 RX ADMIN — Medication 10 ML/HR: at 01:34

## 2025-01-06 RX ADMIN — LOPERAMIDE HYDROCHLORIDE 4 MG: 2 CAPSULE ORAL at 06:31

## 2025-01-06 RX ADMIN — Medication 8 ML: at 01:32

## 2025-01-06 RX ADMIN — ACETAMINOPHEN 975 MG: 325 TABLET ORAL at 22:55

## 2025-01-06 RX ADMIN — MISOPROSTOL 800 MCG: 200 TABLET ORAL at 06:45

## 2025-01-06 RX ADMIN — IBUPROFEN 600 MG: 600 TABLET, FILM COATED ORAL at 16:47

## 2025-01-06 RX ADMIN — LIDOCAINE HYDROCHLORIDE 30 ML: 10 INJECTION, SOLUTION INFILTRATION; PERINEURAL at 06:20

## 2025-01-06 RX ADMIN — METHYLERGONOVINE MALEATE 0.2 MG: 0.2 INJECTION, SOLUTION INTRAMUSCULAR; INTRAVENOUS at 06:24

## 2025-01-06 RX ADMIN — WITCH HAZEL 1 EACH: 5 CLOTH TOPICAL at 12:58

## 2025-01-06 SDOH — HEALTH STABILITY: MENTAL HEALTH: CURRENT SMOKER: 0

## 2025-01-06 ASSESSMENT — PAIN DESCRIPTION - LOCATION: LOCATION: ABDOMEN

## 2025-01-06 ASSESSMENT — PAIN SCALES - GENERAL
PAINLEVEL_OUTOF10: 4
PAINLEVEL_OUTOF10: 0 - NO PAIN
PAINLEVEL_OUTOF10: 4
PAINLEVEL_OUTOF10: 0 - NO PAIN
PAINLEVEL_OUTOF10: 0 - NO PAIN
PAINLEVEL_OUTOF10: 6
PAINLEVEL_OUTOF10: 0 - NO PAIN
PAINLEVEL_OUTOF10: 0 - NO PAIN

## 2025-01-06 NOTE — CARE PLAN
The patient's goals for the shift include relax and bond with baby    The clinical goals for the shift include maintain stable vs and lochia    Over the shift, the patient made progress toward the following goals.

## 2025-01-06 NOTE — SIGNIFICANT EVENT
LABOR PROGRESS NOTE  SUBJECTIVE  Patient doing well with epidural and pitocin infusing. Feeling more rectal pressure, desires SVE.    OBJECTIVE  Visit Vitals  /60   Pulse 92   Temp (!) 35.9 °C (96.6 °F)   Resp 18        Cervical Exam  Dilation: 2  Effacement (%): 80  Fetal Station: -2  Method: Manual  OB Examiner: Parth MOLINA  Fetal Assessment  Movement: Present  Mode: External US  Baseline Fetal Heart Rate (bpm): 135 bpm  Baseline Classification: Normal  Variability: Moderate (Between 6 and 25 BPM)  Pattern: Accelerations  FHR Category: Category I      Contraction Frequency: 1.5-4    A&P    IOL  - Continue to monitor for cervical change  - Recheck as clinically indicated by maternal or fetal status  - Continue pitocin per protocol  - CEFM, Cat I    Jennifer Alba MD, PGY-1

## 2025-01-06 NOTE — SIGNIFICANT EVENT
Lucia deflated and suction turned off    Lucia in place with 120cc saline in cervical balloon. No bleeding around lucia, amt in tubing stable ~100cc    120cc of saline removed from balloon and suction turned off    Will continue monitoring for 1 hr and reevaluate for removal    Labs reviewed hgb 9.7, fibrinogen and coags wnl.     Seen and discussed with Dr. Theresa Velasquez MD PGY-2

## 2025-01-06 NOTE — ANESTHESIA PREPROCEDURE EVALUATION
"Patient: Nicky Herbert \"Yasmeen\"    Evaluation Method: In-person visit    Procedure Information    Date: 01/05/25  Procedure: Labor Consult         Relevant Problems   Anesthesia (within normal limits)      Cardiac   (+) Paroxysmal supraventricular tachycardia (CMS-HCC)      Pulmonary   (+) Recent URI (Cough, congestion about 2 weeks ago)      Neuro   (+) Anxiety      GI  History of gastric sleeve,   (+) Gastroesophageal reflux disease      /Renal (within normal limits)      Endocrine   (+) Obesity in pregnancy ([facility])      Hematology   (+) Anemia affecting second pregnancy ([facility])      Musculoskeletal (within normal limits)      ID   (+) Genital herpes      GYN   (+) 37 weeks gestation of pregnancy ([facility])   (+) 39 weeks gestation of pregnancy ([facility])       Clinical information reviewed:    Allergies                NPO Detail:  No data recorded     OB/GYN     Physical Exam    Airway  Mallampati: III  TM distance: >3 FB     Cardiovascular - normal exam  Rhythm: regular     Dental    Pulmonary - normal exam     Abdominal            Anesthesia Plan    History of general anesthesia?: yes  History of complications of general anesthesia?: no    ASA 3     epidural     The patient is not a current smoker.    Anesthetic plan and risks discussed with patient.  Use of blood products discussed with patient who consented to blood products.        "

## 2025-01-06 NOTE — ANESTHESIA PROCEDURE NOTES
Epidural Block    Patient location during procedure: OB  Start time: 1/6/2025 1:15 AM  End time: 1/6/2025 1:28 AM  Reason for block: labor analgesia  Staffing  Performed: resident   Authorized by: Jarod Dove MD    Performed by: Owen Coats DO    Preanesthetic Checklist  Completed: patient identified, IV checked, risks and benefits discussed, surgical consent, pre-op evaluation, timeout performed and sterile techniques followed  Block Timeout  RN/Licensed healthcare professional reads aloud to the Anesthesia provider and entire team: Patient identity, procedure with side and site, patient position, and as applicable the availability of implants/special equipment/special requirements.  Patient on coagulant treatment: no  Timeout performed at: 1/6/2025 1:15 AM  Block Placement  Patient position: sitting  Prep: ChloraPrep  Sterility prep: cap, drape, gloves and mask  Epidural  Loss of resistance technique: saline  Guidance: landmark technique        Needle  Needle type: Tuohy   Needle gauge: 17  Needle length: 9 cm  Catheter type: multi-orifice  Catheter size: 19 G  Catheter securement method: clear occlusive dressing and liquid medical adhesive    Test dose: lidocaine 1.5% with epinephrine 1-to-200,000  Test dose: lidocaine 1.5% with epinephrine 1-to-200,000  Test dose result: no positive test dose    PCEA  Medication concentration used: 0.044% Bupivacaine with 1.25 mcg/mL Fentanyl and 1:[id number] Epinephrine  Dose (mL): 10  Lockout (minutes): 25  1-Hour Limit (boluses/hr): 4  Basal Rate: 10        Assessment  Sensory level: T10 bilateral  Block outcome: block to be assessed in the OR  Number of attempts: 1  Events: no positive test dose  Procedure assessment: patient tolerated procedure well with no immediate complications

## 2025-01-06 NOTE — SIGNIFICANT EVENT
LABOR PROGRESS NOTE  SUBJECTIVE  Patient doing well. Feeling more uncomfortable with contractions. Okay with SVE now.     OBJECTIVE  Visit Vitals  /72   Pulse 104   Temp 36.3 °C (97.3 °F)   Resp 18        Cervical Exam  Dilation: 2  Effacement (%): 70  Fetal Station: -3  Method: Manual  OB Examiner: Parth MOLINA  Fetal Assessment  Movement: Present  Mode: External US  Baseline Fetal Heart Rate (bpm): 135 bpm  Baseline Classification: Normal  Variability: Moderate (Between 6 and 25 BPM)  Pattern: Accelerations, Variable decelerations  FHR Category: Category I      Contraction Frequency: 2-6    A&P    IOL  - Continue to monitor for cervical change  - Recheck as clinically indicated by maternal or fetal status  - Will start pitocin per protocol since cervical exam unchanged  - CEFM, Cat I    Discussed with Dr. Wendy Alba MD, PGY-1

## 2025-01-06 NOTE — L&D DELIVERY NOTE
"OB Delivery Note  2025  Nicky Herbert \"Yasmeen\"  34 y.o.   Vaginal, Spontaneous     34 y.o.  underwent induction in the s/o SROM and delivered at 39w2d.      complicated by PPH. Postpartum pitocin bolus initiated. Vigorous infant placed on maternal abdomen for skin to skin. Delayed cord clamping. Placenta delivered spontaneously and intact with gentle traction on umbilical cord. Fundus palpated moderately firm, midline, and at umbilicus. Vagina, perineum, and labia inspected. 1st degree perineal laceration noted and repaired with running stitch of 3-0 Vicryl in the usual fashion. Right vaginal laceration repaired with one figure-of-8 stitch of 3-0 Vicryl. Continued bleeding noted from uterus with mild lower uterine segment atony. Methergine and hemabate given. Multiple bimanual exams done with continued clots evacuated from uterus. Given EBL ~1L at this point, decision was made to place Lucia. Lucia was placed with 120ml in cervical seal, suction turned on with half of tubing immediately filling up with blood. 2nd pitocin bolus and 800mcg rectal cytotec given. No bleeding noted around Lucia.    Gestational Age: 39w2d  /Para:   Quantitative Blood Loss: Admission to Discharge: 1500 mL (2025  4:39 PM - 2025  6:53 AM)    Kriss Herbert [[id number]]      Labor Events    Rupture date/time: 2025 1500  Rupture type: Spontaneous  Fluid color: Meconium  Fluid odor: None  Labor type: Induced Onset of Labor  Labor allowed to proceed with plans for an attempted vaginal birth?: Yes  Induction: Oxytocin  Induction indications: Premature ROM  Complications: Hemorrhage, Uterine Atony       Labor Event Times    Labor onset date/time: 2025 1639  Dilation complete date/time: 2025 0554  Start pushing date/time: 2025 0554       Placenta    Placenta delivery date/time: 2025 0605  Placenta removal: Spontaneous  Placenta appearance: Intact  Placenta disposition: discarded   "     Cord    Vessels: 3 vessels  Delayed cord clamping?: Yes  Cord clamped date/time: 2025 06:00:00  Cord blood disposition: Lab  Gases sent?: Yes  Stem cell collection (by provider): No       Lacerations    Episiotomy: None  Perineal laceration: 1st  Perineal laceration repaired?: Yes  Vaginal laceration?: Yes  Vaginal laceration location: right  Vaginal laceration repaired?: Yes  Repair suture: 3-0 Synthetic Suture       Anesthesia    Method: Epidural       Operative Delivery    Forceps attempted?: No  Vacuum extractor attempted?: No       Shoulder Dystocia    Shoulder dystocia present?: No        Delivery    Birth date/time: 2025 06:00:00  Delivery type: Vaginal, Spontaneous  Complications: Hemorrhage, Uterine Atony       Resuscitation    Method: Suctioning, Tactile stimulation       Apgars    Living status: Living  Apgar Component Scores:  1 min.:  5 min.:  10 min.:  15 min.:  20 min.:    Skin color:  0  1       Heart rate:  2  2       Reflex irritability:  2  2       Muscle tone:  2  2       Respiratory effort:  2  2       Total:  8  9       Apgars assigned by: MALINI FULTON       Delivery Providers    Delivering clinician: Reema Denney MD   Provider Role    Wendy Lee, RN Delivery Nurse    Amberly Beatty, RN Nursery Nurse    Jennifer Alba MD Resident    Kamala Nguyen MD Resident                 Jennifer Alba MD

## 2025-01-06 NOTE — SIGNIFICANT EVENT
Lucia removal    Minimal bleeding around lucia and on pad. Lucia removed without difficulty.    Will continue to monitor bleeding for 1 hr    Seen and evaluated with Dr. Theresa Velasquez MD PGY-2

## 2025-01-07 PROBLEM — R03.0 ELEVATED BP WITHOUT DIAGNOSIS OF HYPERTENSION: Status: ACTIVE | Noted: 2025-01-07

## 2025-01-07 PROBLEM — D62 ABLA (ACUTE BLOOD LOSS ANEMIA): Status: ACTIVE | Noted: 2025-01-07

## 2025-01-07 LAB
ALBUMIN SERPL BCP-MCNC: 2.7 G/DL (ref 3.4–5)
ALP SERPL-CCNC: 110 U/L (ref 33–110)
ALT SERPL W P-5'-P-CCNC: 48 U/L (ref 7–45)
ANION GAP SERPL CALC-SCNC: 15 MMOL/L (ref 10–20)
APTT PPP: 30 SECONDS (ref 27–38)
AST SERPL W P-5'-P-CCNC: 36 U/L (ref 9–39)
BILIRUB SERPL-MCNC: 0.5 MG/DL (ref 0–1.2)
BUN SERPL-MCNC: 8 MG/DL (ref 6–23)
CALCIUM SERPL-MCNC: 8.3 MG/DL (ref 8.6–10.6)
CHLORIDE SERPL-SCNC: 107 MMOL/L (ref 98–107)
CO2 SERPL-SCNC: 20 MMOL/L (ref 21–32)
CREAT SERPL-MCNC: 0.49 MG/DL (ref 0.5–1.05)
EGFRCR SERPLBLD CKD-EPI 2021: >90 ML/MIN/1.73M*2
ERYTHROCYTE [DISTWIDTH] IN BLOOD BY AUTOMATED COUNT: 26.6 % (ref 11.5–14.5)
FIBRINOGEN PPP-MCNC: 470 MG/DL (ref 200–400)
GLUCOSE SERPL-MCNC: 65 MG/DL (ref 74–99)
HCT VFR BLD AUTO: 25.9 % (ref 36–46)
HGB BLD-MCNC: 7.5 G/DL (ref 12–16)
INR PPP: 1.2 (ref 0.9–1.1)
MCH RBC QN AUTO: 22 PG (ref 26–34)
MCHC RBC AUTO-ENTMCNC: 29 G/DL (ref 32–36)
MCV RBC AUTO: 76 FL (ref 80–100)
NRBC BLD-RTO: 0.2 /100 WBCS (ref 0–0)
PLATELET # BLD AUTO: 390 X10*3/UL (ref 150–450)
POTASSIUM SERPL-SCNC: 4.3 MMOL/L (ref 3.5–5.3)
PROT SERPL-MCNC: 5.3 G/DL (ref 6.4–8.2)
PROTHROMBIN TIME: 13 SECONDS (ref 9.8–12.8)
RBC # BLD AUTO: 3.41 X10*6/UL (ref 4–5.2)
SODIUM SERPL-SCNC: 138 MMOL/L (ref 136–145)
WBC # BLD AUTO: 8.7 X10*3/UL (ref 4.4–11.3)

## 2025-01-07 PROCEDURE — 2500000001 HC RX 250 WO HCPCS SELF ADMINISTERED DRUGS (ALT 637 FOR MEDICARE OP)

## 2025-01-07 PROCEDURE — 85610 PROTHROMBIN TIME: CPT

## 2025-01-07 PROCEDURE — 80053 COMPREHEN METABOLIC PANEL: CPT

## 2025-01-07 PROCEDURE — 36415 COLL VENOUS BLD VENIPUNCTURE: CPT

## 2025-01-07 PROCEDURE — 2500000004 HC RX 250 GENERAL PHARMACY W/ HCPCS (ALT 636 FOR OP/ED)

## 2025-01-07 PROCEDURE — 85027 COMPLETE CBC AUTOMATED: CPT

## 2025-01-07 PROCEDURE — 1100000001 HC PRIVATE ROOM DAILY

## 2025-01-07 PROCEDURE — 85384 FIBRINOGEN ACTIVITY: CPT

## 2025-01-07 RX ORDER — PANTOPRAZOLE SODIUM 40 MG/1
40 TABLET, DELAYED RELEASE ORAL
Status: DISCONTINUED | OUTPATIENT
Start: 2025-01-07 | End: 2025-01-08 | Stop reason: HOSPADM

## 2025-01-07 RX ORDER — CALCIUM CARBONATE 200(500)MG
500 TABLET,CHEWABLE ORAL 4 TIMES DAILY PRN
Status: DISCONTINUED | OUTPATIENT
Start: 2025-01-07 | End: 2025-01-08 | Stop reason: HOSPADM

## 2025-01-07 RX ADMIN — SIMETHICONE 80 MG: 80 TABLET, CHEWABLE ORAL at 06:19

## 2025-01-07 RX ADMIN — ACETAMINOPHEN 975 MG: 325 TABLET ORAL at 15:27

## 2025-01-07 RX ADMIN — IBUPROFEN 600 MG: 600 TABLET, FILM COATED ORAL at 05:06

## 2025-01-07 RX ADMIN — IBUPROFEN 600 MG: 600 TABLET, FILM COATED ORAL at 15:27

## 2025-01-07 RX ADMIN — PANTOPRAZOLE SODIUM 40 MG: 40 TABLET, DELAYED RELEASE ORAL at 06:37

## 2025-01-07 RX ADMIN — IRON SUCROSE 200 MG: 20 INJECTION, SOLUTION INTRAVENOUS at 16:17

## 2025-01-07 RX ADMIN — ACETAMINOPHEN 975 MG: 325 TABLET ORAL at 05:07

## 2025-01-07 RX ADMIN — VENLAFAXINE 75 MG: 75 TABLET ORAL at 21:29

## 2025-01-07 RX ADMIN — IBUPROFEN 600 MG: 600 TABLET, FILM COATED ORAL at 21:29

## 2025-01-07 RX ADMIN — ENOXAPARIN SODIUM 60 MG: 60 INJECTION SUBCUTANEOUS at 06:37

## 2025-01-07 RX ADMIN — CALCIUM CARBONATE 500 MG: 500 TABLET, CHEWABLE ORAL at 06:37

## 2025-01-07 RX ADMIN — ACETAMINOPHEN 975 MG: 325 TABLET ORAL at 21:28

## 2025-01-07 RX ADMIN — VENLAFAXINE 75 MG: 75 TABLET ORAL at 08:27

## 2025-01-07 ASSESSMENT — PAIN SCALES - GENERAL
PAINLEVEL_OUTOF10: 0 - NO PAIN
PAINLEVEL_OUTOF10: 6

## 2025-01-07 ASSESSMENT — PAIN DESCRIPTION - DESCRIPTORS
DESCRIPTORS: CRAMPING
DESCRIPTORS: CRAMPING

## 2025-01-07 NOTE — LACTATION NOTE
Lactation Consultant Note  Lactation Consultation  Reason for Consult: Initial assessment  Consultant Name: Katheryn Muir RN IBCLC    Maternal Information  Has mother  before?: No  Infant to breast within first 2 hours of birth?: No    Maternal Assessment  Breast Assessment: Large, Symmetrical, Compressible, Soft  Nipple Assessment: Intact, Erect with stimulation, Short  Areola Assessment: Normal    Infant Assessment  Infant Behavior: Fussy, Readiness to feed, Feeding cues observed  Infant Assessment: Good cupping of tongue, Able to elevate tongue to roof of mouth, Tongue protrudes over alveolar ridge    Feeding Assessment  Nutrition Source: Breastmilk  Feeding Method: Nursing at the breast  Feeding Position: Football/seated, Skin to skin, Mother demonstrates good positioning, Mother needs assistance with latch/positioning  Suck/Feeding: Sustained, Baby led rhythmically, Coordinated suck/swallow/breathe  Latch Assessment: Minimal assistance is needed, Instructed on deep latch, Eagerly grasped on to latch, Deep latch obtained, Wide open mouth < 160, Optimal angle of mouth opening, Chin and lower lip contact breast first, Comfortable with no pain, Bursts of sucking, swallowing, and rest    LATCH TOOL  Latch: Grasps breast, tongue down, lips flanged, rhythmic sucking  Audible Swallowing: Spontaneous and intermittent (24 hours old)  Type of Nipple: Everted (After stimulation)  Comfort (Breast/Nipple): Soft/non-tender  Hold (Positioning): Minimal assist, teach one side, mother does other, staff holds  LATCH Score: 9    Breast Pump       Other OB Lactation Tools       Patient Follow-up  Outpatient Lactation Follow-up: Recommended    Other OB Lactation Documentation  Maternal Risk Factors: Age over 30, primiparity, Significant hemorrhage    Recommendations/Summary  When I entered the room mother was in a side lying position with infant next to her in bed. Infant fussing and not currently latched, but mother stated  infant had been on and off the breast since about 0530. Mother stated infant had been breastfeeding well yesterday and overnight and that latching infant has been comfortable, however since about 0530 infant keeps wanting to eat. I offered assistance with latching infant and mother agreeable.    I brought infant to bassinet for oral assessment and to attempt to burp as infant was fussing at the breast. Infant had a bowel movement in diaper, diaper changed. Infant quiet alert in bassinet. I placed infant skin to skin with mother in a football hold at the R breast, using pillow support. Mother demonstrated good positioning, however infant eager to latch and mouth not open at optimal angle on the breast, latch a little shallow. I demonstrated breaking the latch and offered mother assistance with relatching infant, mother receptive. I demonstrated to mother to bring infant to the breast chin first and with her nipple aligned with infants nose. Infant latched well with areolar attachment, nose and chin touching breast, lips flanged, sucks with long jaw movement and swallows observed. Mother reported latch was comfortable.     I educated mother on feeding infant based on feeding cues with a goal of 8-12 times in a 24 hour period or every 2-3 hours, feeding infant on first breast until they unlatch or until tactile stimulation is not keeping them sucking/swallowing at breast. I then recommended burping infant and then trying to latch/feed infant on other breast. I educated mother on the characteristics and benefits of a deep and proper latch, we reviewed infant body alignment at the breast and tactile stimulation techniques to keep infant actively feeding at the breast. We also discussed cluster feeding and expected infant output on different days of life.     Mother has a pump for home, reviewed outpatient lactation resources available to her, encouraged to call for any questions or assistance as needed.

## 2025-01-07 NOTE — ANESTHESIA POSTPROCEDURE EVALUATION
"Patient: Nicky Herbert \"Yasmeen\"    Procedure Summary       Date: 25 Room / Location:     Anesthesia Start: 115 Anesthesia Stop: 600    Procedure: Labor Analgesia Diagnosis:     Scheduled Providers:  Responsible Provider: Nico Kerr DO    Anesthesia Type: epidural ASA Status: 3            Anesthesia Type: epidural        Anesthesia Post Evaluation    Patient location during evaluation: floor  Patient participation: complete - patient participated  Level of consciousness: awake  Pain management: adequate  Airway patency: patent  Cardiovascular status: acceptable  Respiratory status: acceptable  Hydration status: acceptable  Postoperative Nausea and Vomiting: none  Comments: Nicky Herbert is a 34 y.o., , who had a Vaginal, Spontaneous delivery on 2025 at 39w2d and is now POD1.    She had Neuraxial Anesthesia without immediate complications noted.    During labor, patient reported right sided epidural, little pain relief on left.  Had a prior efficacious labor epidural for prior pregnancy.  Understands what transpired and is satisfied with overall experience despite poor pain control.    Pain well controlled    ---------------------------               25                      0730         ---------------------------   BP:          107/72         Pulse:         101          Resp:          18           Temp:   36.7 °C (98.1 °F)   SpO2:          96%         ---------------------------    Neuraxial site assessed. No visible redness or swelling or drainage. Patient able to ambulate and move all extremities without difficulty. Able to void. No complaints of nausea/vomiting. Tolerating PO intake well. No s/sx of PDPH.     Anesthesia will sign off     ENOCH Marshall         No notable events documented.    "
92

## 2025-01-07 NOTE — PROGRESS NOTES
Postpartum Progress Note    Assessment/Plan   Nicky Herbert is a 34 y.o., , who delivered at 39w2d gestation via Vaginal, Spontaneous after IOL in s/o PROM.    Now PPD#1 s/p Vaginal, Spontaneous on 2025  - Continue routine postpartum care  - Pain well controlled on po medications  - DVT risk score DVT Score: 5 , ppx with SCDs, ambulation, and lovenox  - RH positive, rhogam not indicated  - Hgb:   Results from last 7 days   Lab Units 25  1605 25  0714 25  1810   HEMOGLOBIN g/dL 7.5* 9.7* 10.8*        PPH, ABLA  - 1.5 L EBL in s/o HADLEY atony and 2 small vaginal lacerations s/o repair  - s/p methergine, hemabate, 2nd pit bolus, rectal cytotec, Lucia, 3rd pit bolus after Lucia removal  - hgb 10.8-> EBL 1.5 L-> 9.7 1 hr postpartum-> 7.5 PPD1, appropriate drop in hgb consistent with EBL  - Repeat coags, fibrinogen stable  - Asymptomatic of anemia, for PO Fe on discharge  - Iron sucrose 200 mg IV daily while inpatient in s/o h/o bariatric surgery    Elevated BP without diagnosis of HDP  - Mild range BPs on L&D in s/o painful contractions, severe range and mild range BPs in s/o epidural placement  - Normotensive postpartum  - Baseline HELLP labs n/f ALT/AST 44/60-> 36/48, down-trending  - Asymptomatic    Maternal Well-Being  - Vitals stable  - All questions and concerns address  - Anxiety on effexor, symptoms well controlled, declines OB Behavioral Health referral at this time    [place] Feeding  - Breastfeeding/pumping encouraged  - Lactation consult prn    Contraception  - none  - IVF pregnancy, education provided    Dispo  - Anticipate d/c on PPD #2 if meeting all postpartum milestones  - Follow-up in 4-6wks with primary CHEYANNE Gross-CNP     Assessment & Plan  39 weeks gestation of pregnancy ([facility])    Recent URI    Gastroesophageal reflux disease    Postpartum hemorrhage    ABLA (acute blood loss anemia)    Elevated BP without diagnosis of hypertension    Pregnancy  "Problems (from 09/05/24 to present)       Problem Noted Diagnosed Resolved    39 weeks gestation of pregnancy ([facility]) 1/5/2025 by So Young MD  No    Priority:  Medium       Anxiety in pregnancy, antepartum ([facility]) 9/5/2024 by Kandice Castellon MD  No    Priority:  Medium       Overview Signed 9/5/2024  1:23 PM by Kandice Castellon MD     Well controlled on effexor         Bariatric surgery status complicating pregnancy, second trimester ([facility]) 9/5/2024 by Kandice Castellon MD  No    Priority:  Medium       Overview Addendum 11/1/2024 12:46 PM by Kandice Castellon MD     - low vitamin D, started supplement, will repeat next visit  - iron deficiency anemia, IV infusions scheduled  - Recommend serial growth ultrasounds in third trimester             37 weeks gestation of pregnancy ([facility]) 9/5/2024 by Kandice Castellon MD  No    Priority:  Medium       Overview Addendum 12/24/2024 11:30 AM by Kandice Castellon MD     Desired provider in labor: [] CNM  [x] Physician  [x] Blood Products: [x] Yes, accepts [] No, needs counseling  [x] Initial BMI: 45  [x] Prenatal Labs: Received records from [facility], scanned to chart prenatal labs reviewed (hgb 10.7, ferritin 5, HIV NR, Hep B NR, Hep C NR, Rubella immune, RPR neg, Gc/chlamydia neg, hgb A1c 5.2, O+  [x] Cervical Cancer Screening up to date, neg co-testing 4/2023  [x] Rh status: O+ on chart review  [x] Genetic Screening:  cf DNA \"No call\" at 10-11 weeks gestation, re drawn at [facility] and rr cfDNA. discussed this result and option for genetic counseling/diagnostic testing and she declines  [x] NT US: (11-13 wks) Normal  [x] Baby ASA (if indicated): Indicated and taking  [x] Pregnancy dated by: day 5 ET    [x] Anatomy US: (19-20 wks): completed and normal  [x] Federal Sterilization consent signed (if indicated): No, infertility   [x] 1hr GCT at 24-28wks: normal, 117  [x] Rhogam (if indicated): O+ on chart review, not indicated  [x] Fetal " Surveillance (if indicated): Weekly at 34  [x] Tdap (27-32 wks, may be given up to 36 wks if initial window missed): Given 10/31  [x] RSV (32-36 wks) (Sept. to end of ): Declined  [x] Flu Vaccine: Given 24    [x] Breastfeeding: Plans to bf and pump, plans to take class, did not bf daughter  [x] Postpartum Birth control method: Declines, IVF pregnancy  [x] GBS at 36 - 37 wks: negative  [x] 39 weeks discussion of IOL vs. Expectant management: Scheduled  at 39w3d  [x] Mode of delivery ( anticipated ):            Obesity in pregnancy ([facility]) 10/17/2023 by Lupe Bah  No    Priority:  Medium       Overview Signed 2024  1:21 PM by Kandice Castellon MD     - Recommend serial growth at 30 and 36 weeks  -  surveillance weekly starting at 34 weeks (NST or BPP)  - delivery 39 weeks or sooner if indicated           Anemia affecting second pregnancy ([facility]) 10/17/2023 by Lupe Bah  No    Priority:  Medium       Overview Addendum 2024 12:34 PM by Kandice Castellon MD     Pt reports 3 IV iron transfusions earlier this pregnancy, Hgb 9.9 on 10/3/24, ferritin 11  IV iron transfusions ordered, did not go, has rescheduled multiple times  Hgb now 8.7.   Had 2 iron transfusions last week. One more scheduled prior to IOL  Plan for repeat labs at time of IOL                 Subjective     Nicky Herbert is PPD#1 s/p vaginal delivery who reports feeling overall well.    Her pain is well controlled with current medications  She is passing flatus  She is ambulating well  She is tolerating a Adult diet Regular  She reports no breast or nursing problems  She denies emotional concerns today      Denies dizziness/lightheadedness/LOC/SOB/uncontrolled bleeding   Denies HA, SOB, RUQ pain, vision changes.     Objective   Allergies:   Patient has no known allergies.         Last Vitals:  Temp Pulse Resp BP MAP Pulse Ox   36.8 °C (98.2 °F) 92 16 110/78   94 %     Vitals Min/Max Last 24  Hours:  Temp  Min: 36.5 °C (97.7 °F)  Max: 36.8 °C (98.2 °F)  Pulse  Min: 85  Max: 101  Resp  Min: 16  Max: 18  BP  Min: 98/65  Max: 123/74    Intake/Output:   No intake or output data in the 24 hours ending 01/07/25 1707    Physical Exam:  General: Examination reveals a well developed, well nourished, female, in no acute distress. She is alert and cooperative.  Lungs: symmetrical, non-labored breathing.  Cardiac: warm, well-perfused.  Abdomen: soft, non-tender.  Fundus: firm, below umbilicus, and nontender.  Extremities: no redness or tenderness in the calves or thighs.  Neurological: alert, oriented, normal speech, no focal findings or movement disorder noted.     Lab Data:  Labs in chart were reviewed.

## 2025-01-07 NOTE — CARE PLAN
The patient's goals for the shift include bond with baby    The clinical goals for the shift include maintain stable vital signs    Over the shift, the patient did make progress toward the following goals.

## 2025-01-07 NOTE — CARE PLAN
The patient's goals for the shift include bond with baby    The clinical goals for the shift include healthy mom and healthy baby      Problem: Postpartum  Goal: Experiences normal postpartum course  Outcome: Progressing     Problem: Safety - Adult  Goal: Free from fall injury  Outcome: Progressing      calm

## 2025-01-08 VITALS
RESPIRATION RATE: 18 BRPM | WEIGHT: 293 LBS | TEMPERATURE: 97.9 F | OXYGEN SATURATION: 98 % | SYSTOLIC BLOOD PRESSURE: 110 MMHG | BODY MASS INDEX: 43.4 KG/M2 | HEIGHT: 69 IN | HEART RATE: 96 BPM | DIASTOLIC BLOOD PRESSURE: 71 MMHG

## 2025-01-08 LAB
BLOOD EXPIRATION DATE: NORMAL
BLOOD EXPIRATION DATE: NORMAL
DISPENSE STATUS: NORMAL
DISPENSE STATUS: NORMAL
PRODUCT BLOOD TYPE: 5100
PRODUCT BLOOD TYPE: NORMAL
PRODUCT CODE: NORMAL
PRODUCT CODE: NORMAL
UNIT ABO: NORMAL
UNIT ABO: NORMAL
UNIT NUMBER: NORMAL
UNIT NUMBER: NORMAL
UNIT RH: NORMAL
UNIT RH: NORMAL
UNIT VOLUME: 278
UNIT VOLUME: 286
XM INTEP: NORMAL
XM INTEP: NORMAL

## 2025-01-08 PROCEDURE — 2500000004 HC RX 250 GENERAL PHARMACY W/ HCPCS (ALT 636 FOR OP/ED)

## 2025-01-08 PROCEDURE — 2500000001 HC RX 250 WO HCPCS SELF ADMINISTERED DRUGS (ALT 637 FOR MEDICARE OP)

## 2025-01-08 RX ORDER — FERROUS SULFATE 325(65) MG
325 TABLET ORAL
Qty: 60 TABLET | Refills: 0 | Status: SHIPPED | OUTPATIENT
Start: 2025-01-08

## 2025-01-08 RX ORDER — ACETAMINOPHEN 325 MG/1
975 TABLET ORAL EVERY 6 HOURS PRN
Qty: 120 TABLET | Refills: 0 | Status: SHIPPED | OUTPATIENT
Start: 2025-01-08

## 2025-01-08 RX ORDER — IBUPROFEN 600 MG/1
600 TABLET ORAL EVERY 6 HOURS PRN
Qty: 60 TABLET | Refills: 0 | Status: SHIPPED | OUTPATIENT
Start: 2025-01-08

## 2025-01-08 RX ORDER — POLYETHYLENE GLYCOL 3350 17 G/17G
17 POWDER, FOR SOLUTION ORAL 2 TIMES DAILY PRN
Qty: 14 PACKET | Refills: 0 | Status: SHIPPED | OUTPATIENT
Start: 2025-01-08

## 2025-01-08 RX ADMIN — ENOXAPARIN SODIUM 60 MG: 60 INJECTION SUBCUTANEOUS at 06:35

## 2025-01-08 RX ADMIN — IBUPROFEN 600 MG: 600 TABLET, FILM COATED ORAL at 09:02

## 2025-01-08 RX ADMIN — ACETAMINOPHEN 975 MG: 325 TABLET ORAL at 09:02

## 2025-01-08 RX ADMIN — ACETAMINOPHEN 975 MG: 325 TABLET ORAL at 03:05

## 2025-01-08 RX ADMIN — POLYETHYLENE GLYCOL 3350 17 G: 17 POWDER, FOR SOLUTION ORAL at 09:12

## 2025-01-08 RX ADMIN — PANTOPRAZOLE SODIUM 40 MG: 40 TABLET, DELAYED RELEASE ORAL at 06:35

## 2025-01-08 RX ADMIN — IRON SUCROSE 200 MG: 20 INJECTION, SOLUTION INTRAVENOUS at 10:13

## 2025-01-08 RX ADMIN — VENLAFAXINE 75 MG: 75 TABLET ORAL at 09:02

## 2025-01-08 RX ADMIN — IBUPROFEN 600 MG: 600 TABLET, FILM COATED ORAL at 03:05

## 2025-01-08 ASSESSMENT — PAIN SCALES - GENERAL
PAINLEVEL_OUTOF10: 2
PAINLEVEL_OUTOF10: 3

## 2025-01-08 ASSESSMENT — PAIN DESCRIPTION - DESCRIPTORS
DESCRIPTORS: CRAMPING
DESCRIPTORS: CRAMPING

## 2025-01-08 NOTE — CARE PLAN
Problem: Pain - Adult  Goal: Verbalizes/displays adequate comfort level or baseline comfort level  Outcome: Met     Problem: Safety - Adult  Goal: Free from fall injury  Outcome: Met     Problem: Postpartum  Goal: Experiences normal postpartum course  Outcome: Met  Goal: Appropriate maternal -  bonding  Outcome: Met  Goal: Establish and maintain infant feeding pattern for adequate nutrition  Outcome: Met  Goal: Incisions, wounds, or drain sites healing without S/S of infection  Outcome: Met  Goal: No s/sx infection  Outcome: Met  Goal: No s/sx of hemorrhage  Outcome: Met  Goal: Minimal s/sx of HDP and BP<160/110  Outcome: Met   The patient's goals for the shift include To rest    The clinical goals for the shift include To meet postpartum milestones    Over the shift, the patient did make progress toward the following goals. Discharge instructions reviewed with patient. Patient ready for discharge

## 2025-01-08 NOTE — CARE PLAN
The patient's goals for the shift include work on breastfeeding, rest    The clinical goals for the shift include pain well controlled, VSS    VSS, pain well controlled. Lochia scant, breastfeeding and bonding with baby.    Problem: Vaginal Birth or  Section  Goal: Prevention of malpresentation/labor dystocia through delivery  Outcome: Progressing  Goal: No s/sx of infection through recovery  Outcome: Progressing  Goal: No s/sx of hemorrhage through recovery  Outcome: Progressing     Problem: Anemia in pregnancy  Goal: Tolerates treatment for anemia  Outcome: Progressing     Problem: Nausea/Vomiting  Goal: Adequate urine output (0.5 ml/kg/hr)  Outcome: Progressing  Goal: Free from nausea/vomiting  Outcome: Progressing  Goal: Tolerates prescribed diet  Outcome: Progressing  Goal: Weight maintenance or gain  Outcome: Progressing  Goal: Achieve/maintain normal electrolyte level  Outcome: Progressing     Problem: Pain - Adult  Goal: Verbalizes/displays adequate comfort level or baseline comfort level  Outcome: Progressing     Problem: Safety - Adult  Goal: Free from fall injury  Outcome: Progressing     Problem: Postpartum  Goal: Experiences normal postpartum course  Outcome: Progressing  Goal: Appropriate maternal -  bonding  Outcome: Progressing  Goal: Establish and maintain infant feeding pattern for adequate nutrition  Outcome: Progressing  Goal: Incisions, wounds, or drain sites healing without S/S of infection  Outcome: Progressing  Goal: No s/sx infection  Outcome: Progressing  Goal: No s/sx of hemorrhage  Outcome: Progressing  Goal: Minimal s/sx of HDP and BP<160/110  Outcome: Progressing

## 2025-01-08 NOTE — DISCHARGE SUMMARY
Discharge Summary    Admission Date: 2025  Discharge Date: 25  Discharge Diagnosis: 39 weeks gestation of pregnancy ([facility])     Patient Active Problem List   Diagnosis    Paroxysmal supraventricular tachycardia (CMS-HCC)    Obesity in pregnancy ([facility])    History of colonic polyps    Genital herpes    Anxiety    Anemia affecting second pregnancy ([facility])    Anxiety in pregnancy, antepartum ([facility])    Bariatric surgery status complicating pregnancy, second trimester ([facility])    Conceived by in vitro fertilization    37 weeks gestation of pregnancy ([facility])    39 weeks gestation of pregnancy ([facility])    Recent URI    Gastroesophageal reflux disease    Postpartum hemorrhage    ABLA (acute blood loss anemia)    Elevated BP without diagnosis of hypertension     Hospital Course  Nicky Herbert is a 34 y.o.,     Initially presented for: SROM, labor    Admission Date: 2025    Delivery Date: 2025 6:00 AM    Delivery type: Vaginal, Spontaneous     GA at delivery: 39w2d    Outcome: Living    Anesthesia during delivery: Epidural    Intrapartum complications: Hemorrhage;Uterine Atony    Feeding method: Breastfeeding Status: Yes    Contraception: declines bridge method and discussed pregnancy spacing of at least one year, abstaining from intercourse for 6wks, and the ability to become pregnant in the absence of regular menses. Pt verbalized understanding    Rhogam: The patient's blood type is O POS. The baby's blood type is O POS. Rhogam is not indicated.     Now postpartum day: 2.    Hospital course n/f:      Acute blood loss anemia in s/o PPH  - HMG 10.8 > EBL 1500 > immediately post delivery 9.7 > PPD#1 7.5  - large blood loss in s/o HADLEY atony and 2 small vaginal lacerations s/p repair  - coags, fibrinogen WNL  - asx, IV Iron Sucrose 200 x2 doses while inpatient in s/o h/o bariatric surgery  - continue to monitor for s/sx of anemia, consider further outpatient IV infusions if  sx    Elevated BP w/o HDP  - mild range BP readings on L&D in s/o painful ctx, severe range BPs in s/o epidural placement  - normotensive in PP period  - HELLP labs n/f   -- ALT/AST 44/60 > 36/48  - asx      PP course otherwise uneventful.  Meeting all postpartum milestones- ambulating independently, passing flatus, tolerating PO intake, lochia light, voiding spontaneously, and pain well controlled with PO meds.       Dispo  OK for DC today    - The signs and symptoms of PEC were reviewed with the patient, including unrelenting headache, vision changes/blurred vision, and RUQ pain    - Follow up with primary OB in:       - 4-6 weeks for post-partum visit       Pertinent Physical Exam At Time of Discharge  General: well appearing, well nourished, postpartum  Obstetric: fundus firm below umbilicus, lochia light  Skin: Warm, dry; no rashes/lesions/erythema  Neuro: A/Ox3, conversational, no gross motor deficit   GI: no distension, appropriately tender, soft  Respiratory: Even and unlabored on RA  Cardiovascular: Trace BLE edema; No erythema, warmth  Psych: appropriate mood and affect         Your medication list        START taking these medications        Instructions Last Dose Given Next Dose Due   acetaminophen 325 mg tablet  Commonly known as: Tylenol      Take 3 tablets (975 mg) by mouth every 6 hours if needed for mild pain (1 - 3) or moderate pain (4 - 6).       ferrous sulfate (325 mg ferrous sulfate) tablet  Commonly known as: Iron (ferrous sulfate)      Take 1 tablet (325 mg) by mouth once daily in the morning. Take before meals. Take on an empty stomach with vitamin C supplement or vitamin C containing juice       ibuprofen 600 mg tablet      Take 1 tablet (600 mg) by mouth every 6 hours if needed for mild pain (1 - 3) or moderate pain (4 - 6).       polyethylene glycol 17 gram packet  Commonly known as: Glycolax, Miralax      Take 17 g by mouth 2 times a day as needed (constipation).              CONTINUE  taking these medications        Instructions Last Dose Given Next Dose Due   ergocalciferol 1.25 MG (23312 UT) capsule  Commonly known as: Vitamin D-2           omeprazole 40 mg DR capsule  Commonly known as: PriLOSEC           valACYclovir 500 mg tablet  Commonly known as: Valtrex      Take 1 tablet (500 mg) by mouth once daily.       venlafaxine 75 mg tablet  Commonly known as: Effexor                  STOP taking these medications      aspirin 81 mg EC tablet                  Where to Get Your Medications        These medications were sent to [facility]/pharmacy #[id number] - [place], [place] - [address] [address]      Phone: [phone]   acetaminophen 325 mg tablet  ferrous sulfate (325 mg ferrous sulfate) tablet  ibuprofen 600 mg tablet  polyethylene glycol 17 gram packet           Outpatient Follow-Up  No future appointments.    I spent 25 minutes in the professional and overall care of this patient    Kristin Saldana, APRN-CNP

## 2025-01-08 NOTE — LACTATION NOTE
Lactation Consultant Note  Lactation Consultation  Reason for Consult: Follow-up assessment  Consultant Name: Marcela Mahoney RN,  IBCLC    Maternal Information       Maternal Assessment  Breast Assessment: Large, Symmetrical  Nipple Assessment: Intact, Rounded after feeding, Erect  Areola Assessment: Normal    Infant Assessment  Infant Behavior: Other (Comment), Sucking (feeding)    Feeding Assessment  Nutrition Source: Breastmilk  Feeding Method: Nursing at the breast  Feeding Position: Baby led, Skin to skin, Football/seated, Mother demonstrates good positioning  Suck/Feeding: Sustained, Baby led rhythmically, Audible swallowing, Coordinated suck/swallow/breathe  Latch Assessment: Areolar attachment, Deep latch obtained, Comfortable with no pain, Bursts of sucking, swallowing, and rest, Comfortable latch    LATCH TOOL  Latch: Grasps breast, tongue down, lips flanged, rhythmic sucking  Audible Swallowing: Spontaneous and intermittent (24 hours old)  Type of Nipple: Everted (After stimulation)  Comfort (Breast/Nipple): Soft/non-tender  Hold (Positioning): No assist from staff, mother able to position/hold infant  LATCH Score: 10    Breast Pump       Other OB Lactation Tools       Patient Follow-up  Outpatient Lactation Follow-up: Recommended    Other OB Lactation Documentation  Maternal Risk Factors: Age over 30, primiparity, Significant hemorrhage, Obesity (pre-pregnancy BMI >30), Other (comment) (hx anxiety, bariatic surgery)  Infant Risk Factors: Early term birth 37-39 weeks    Recommendations/Summary  Infant already latched to the right breast in football hold when I entered the room.   Noted deep latch and baby intermittently swallowing (baby led feeding). Mom denies any pain with the latch.   Baby detached while I was still in the room; mom was going to latch the baby to the other breast but, declined needing assistance at this time. (She verbally advised me that she worked with lactation yesterday).    Encouraged her to call me for assistance, if needed.     Reviewed feeding cues, breat feeding on demand, output on different days of life, average percentage of weight loss, milk production/ supply, and the other breastfeeding education topics.      Encouraged mom to breastfeed on demand with a goal of 8-12 feeds in a 24 hour period.   If baby is not showing feeding cues and it has been 3 hours since the last time the baby was fed or the last time the baby attempted to feed, encouraged her to place baby in skin to skin.      She was given outpatient lactation resource list yesterday.   She has a breast pump for at home.     She denies any questions or concerns at this time.     Encouraged her to utilize the outpatient lactation resources, if needed. C  ontact information given.   [phone] [place] or [phone] [place]

## 2025-01-09 ENCOUNTER — PATIENT OUTREACH (OUTPATIENT)
Dept: CARE COORDINATION | Facility: CLINIC | Age: 35
End: 2025-01-09
Payer: COMMERCIAL

## 2025-01-09 NOTE — PROGRESS NOTES
Patient not enrolled for Care management because her diagnosis , pregnancy, is on the Exception List.    Kylee Cuevas RN  [phone]

## 2025-01-10 ENCOUNTER — TELEPHONE (OUTPATIENT)
Dept: OBSTETRICS AND GYNECOLOGY | Facility: CLINIC | Age: 35
End: 2025-01-10
Payer: COMMERCIAL

## 2025-01-10 NOTE — TELEPHONE ENCOUNTER
Patient sent My Point...Exactly message stating:  My arm is swollen, warm, and hurting where my IV was (and iron was given twice) while I was in the hospital. I had my son on the 6th and iron was given on the 7th and 8th. I don’t know if this is from the iron but I can’t think of any other reason. What should I do? I’ve taken Tylenol and Ibuprofen and have used ice on and off.        --  Called patient. Patient stated the swelling start Thursday morning. Patient rates the pain a 4-5. Patient has some redness noted in the area. Patient stated the it has worsened since Thursday. Patient advised to go to labor and delivery triage for further evaluation.

## 2025-04-04 ENCOUNTER — TELEPHONE (OUTPATIENT)
Dept: OBSTETRICS AND GYNECOLOGY | Facility: CLINIC | Age: 35
End: 2025-04-04
Payer: COMMERCIAL

## 2025-04-04 DIAGNOSIS — N93.9 ABNORMAL UTERINE BLEEDING (AUB): Primary | ICD-10-CM

## 2025-04-04 RX ORDER — MEDROXYPROGESTERONE ACETATE 10 MG/1
10 TABLET ORAL DAILY
Qty: 30 TABLET | Refills: 0 | Status: SHIPPED | OUTPATIENT
Start: 2025-04-04 | End: 2026-04-04

## 2025-04-04 NOTE — TELEPHONE ENCOUNTER
Called patient. Patient had baby 1/08/2025. Patient stated she just got her first real period and has been bleeding for 9 days. Patient stated she is going through a super tampon every 2 hours. Patient is not currently on any birth control. Patient is not currently breastfeeding.     Patient denies lightheadedness and dizziness or changes in vision.     Patient informed I would discuss this over with  about next steps.

## 2025-04-04 NOTE — TELEPHONE ENCOUNTER
Called patient. Patient informed that  sent a prescription over for provera to help with the bleeding. Went over prescription instruction. Patient has an appointment with Dr. Castellon on the 24th of April for follow up.     Bleeding precautions were discussed and when to report to the ED.    Patient does not have any questions or concerns at this time.

## 2025-04-24 ENCOUNTER — APPOINTMENT (OUTPATIENT)
Dept: OBSTETRICS AND GYNECOLOGY | Facility: CLINIC | Age: 35
End: 2025-04-24
Payer: COMMERCIAL